# Patient Record
Sex: FEMALE | Race: WHITE | Employment: FULL TIME | URBAN - METROPOLITAN AREA
[De-identification: names, ages, dates, MRNs, and addresses within clinical notes are randomized per-mention and may not be internally consistent; named-entity substitution may affect disease eponyms.]

---

## 2017-03-28 ENCOUNTER — OFFICE VISIT (OUTPATIENT)
Dept: FAMILY MEDICINE CLINIC | Age: 52
End: 2017-03-28
Payer: COMMERCIAL

## 2017-03-28 VITALS
SYSTOLIC BLOOD PRESSURE: 120 MMHG | RESPIRATION RATE: 16 BRPM | HEIGHT: 60 IN | DIASTOLIC BLOOD PRESSURE: 86 MMHG | BODY MASS INDEX: 33.38 KG/M2 | TEMPERATURE: 99.1 F | HEART RATE: 80 BPM | WEIGHT: 170 LBS

## 2017-03-28 DIAGNOSIS — Z00.00 ROUTINE HEALTH MAINTENANCE: ICD-10-CM

## 2017-03-28 DIAGNOSIS — R06.83 SNORING: ICD-10-CM

## 2017-03-28 DIAGNOSIS — R63.5 ABNORMAL WEIGHT GAIN: Primary | ICD-10-CM

## 2017-03-28 DIAGNOSIS — Z11.59 NEED FOR HEPATITIS C SCREENING TEST: ICD-10-CM

## 2017-03-28 DIAGNOSIS — Z12.12 ENCOUNTER FOR COLORECTAL CANCER SCREENING: ICD-10-CM

## 2017-03-28 DIAGNOSIS — Z13.1 SCREENING FOR DIABETES MELLITUS: ICD-10-CM

## 2017-03-28 DIAGNOSIS — Z11.4 SCREENING FOR HIV (HUMAN IMMUNODEFICIENCY VIRUS): ICD-10-CM

## 2017-03-28 DIAGNOSIS — Z12.11 ENCOUNTER FOR COLORECTAL CANCER SCREENING: ICD-10-CM

## 2017-03-28 DIAGNOSIS — E66.9 OBESITY (BMI 30.0-34.9): ICD-10-CM

## 2017-03-28 DIAGNOSIS — R73.01 IFG (IMPAIRED FASTING GLUCOSE): ICD-10-CM

## 2017-03-28 DIAGNOSIS — G47.19 EXCESSIVE DAYTIME SLEEPINESS: ICD-10-CM

## 2017-03-28 PROCEDURE — 1036F TOBACCO NON-USER: CPT | Performed by: NURSE PRACTITIONER

## 2017-03-28 PROCEDURE — 3014F SCREEN MAMMO DOC REV: CPT | Performed by: NURSE PRACTITIONER

## 2017-03-28 PROCEDURE — 3017F COLORECTAL CA SCREEN DOC REV: CPT | Performed by: NURSE PRACTITIONER

## 2017-03-28 PROCEDURE — 99214 OFFICE O/P EST MOD 30 MIN: CPT | Performed by: NURSE PRACTITIONER

## 2017-03-28 PROCEDURE — G8484 FLU IMMUNIZE NO ADMIN: HCPCS | Performed by: NURSE PRACTITIONER

## 2017-03-28 PROCEDURE — G8427 DOCREV CUR MEDS BY ELIG CLIN: HCPCS | Performed by: NURSE PRACTITIONER

## 2017-03-28 PROCEDURE — G8417 CALC BMI ABV UP PARAM F/U: HCPCS | Performed by: NURSE PRACTITIONER

## 2017-03-28 RX ORDER — TAPENTADOL HYDROCHLORIDE 100 MG/1
100 TABLET, FILM COATED ORAL 3 TIMES DAILY
Refills: 0 | COMMUNITY
Start: 2017-01-15 | End: 2017-06-09 | Stop reason: ALTCHOICE

## 2017-03-28 ASSESSMENT — SLEEP AND FATIGUE QUESTIONNAIRES
HOW LIKELY ARE YOU TO NOD OFF OR FALL ASLEEP WHEN YOU ARE A PASSENGER IN A CAR FOR AN HOUR WITHOUT A BREAK: 1
HOW LIKELY ARE YOU TO NOD OFF OR FALL ASLEEP IN A CAR, WHILE STOPPED FOR A FEW MINUTES IN TRAFFIC: 0
HOW LIKELY ARE YOU TO NOD OFF OR FALL ASLEEP WHILE SITTING INACTIVE IN A PUBLIC PLACE: 2
HOW LIKELY ARE YOU TO NOD OFF OR FALL ASLEEP WHILE SITTING AND READING: 2
ESS TOTAL SCORE: 9
HOW LIKELY ARE YOU TO NOD OFF OR FALL ASLEEP WHILE SITTING AND TALKING TO SOMEONE: 0
NECK CIRCUMFERENCE (INCHES): 16.5
HOW LIKELY ARE YOU TO NOD OFF OR FALL ASLEEP WHILE WATCHING TV: 2
HOW LIKELY ARE YOU TO NOD OFF OR FALL ASLEEP WHILE SITTING QUIETLY AFTER LUNCH WITHOUT ALCOHOL: 0
HOW LIKELY ARE YOU TO NOD OFF OR FALL ASLEEP WHILE LYING DOWN TO REST IN THE AFTERNOON WHEN CIRCUMSTANCES PERMIT: 2

## 2017-04-02 ASSESSMENT — ENCOUNTER SYMPTOMS
BLOOD IN STOOL: 0
SORE THROAT: 0
ABDOMINAL DISTENTION: 0
SHORTNESS OF BREATH: 0
EYE PAIN: 0
RHINORRHEA: 0
DIARRHEA: 0
VOMITING: 0
ALLERGIC/IMMUNOLOGIC COMMENTS: PCN
WHEEZING: 0
NAUSEA: 0
BACK PAIN: 1
CHEST TIGHTNESS: 0
CONSTIPATION: 0
PHOTOPHOBIA: 0
ABDOMINAL PAIN: 0
COUGH: 0

## 2017-04-12 LAB
ALBUMIN SERPL-MCNC: 4.1 G/DL
ALP BLD-CCNC: 65 U/L
ALT SERPL-CCNC: 30 U/L
ANTIBODY: NONREACTIVE
AST SERPL-CCNC: 20 U/L
BASOPHILS ABSOLUTE: 74 /ΜL
BASOPHILS RELATIVE PERCENT: 0.7 %
BILIRUB SERPL-MCNC: 0.8 MG/DL (ref 0.1–1.4)
BUN BLDV-MCNC: 12 MG/DL
CALCIUM SERPL-MCNC: 9.3 MG/DL
CHLORIDE BLD-SCNC: 105 MMOL/L
CHOLESTEROL, TOTAL: 177 MG/DL
CHOLESTEROL/HDL RATIO: 4.4
CO2: 27 MMOL/L
CREAT SERPL-MCNC: 0.62 MG/DL
EOSINOPHILS ABSOLUTE: 116 /ΜL
EOSINOPHILS RELATIVE PERCENT: 1.1 %
GFR CALCULATED: NORMAL
GLUCOSE BLD-MCNC: 100 MG/DL
HBA1C MFR BLD: 6.7 %
HCT VFR BLD CALC: 39.4 % (ref 36–46)
HDLC SERPL-MCNC: 40 MG/DL (ref 35–70)
HEMOGLOBIN: 13 G/DL (ref 12–16)
LDL CHOLESTEROL CALCULATED: 100 MG/DL (ref 0–160)
LYMPHOCYTES ABSOLUTE: 2394 /ΜL
LYMPHOCYTES RELATIVE PERCENT: 22.8 %
MCH RBC QN AUTO: 30.3 PG
MCHC RBC AUTO-ENTMCNC: 32.9 G/DL
MCV RBC AUTO: 91.8 FL
MONOCYTES ABSOLUTE: 609 /ΜL
MONOCYTES RELATIVE PERCENT: 5.8 %
NEUTROPHILS ABSOLUTE: 7308 /ΜL
NEUTROPHILS RELATIVE PERCENT: 69.6 %
PLATELET # BLD: 274 K/ΜL
PMV BLD AUTO: 8.6 FL
POTASSIUM SERPL-SCNC: 4.5 MMOL/L
RBC # BLD: 4.29 10^6/ΜL
SODIUM BLD-SCNC: 139 MMOL/L
TOTAL PROTEIN: 6.3
TRIGL SERPL-MCNC: 183 MG/DL
TSH SERPL DL<=0.05 MIU/L-ACNC: 2.39 UIU/ML
VLDLC SERPL CALC-MCNC: 137 MG/DL
WBC # BLD: 10.5 10^3/ML

## 2017-04-13 DIAGNOSIS — Z00.00 ROUTINE HEALTH MAINTENANCE: ICD-10-CM

## 2017-04-18 ENCOUNTER — OFFICE VISIT (OUTPATIENT)
Dept: FAMILY MEDICINE CLINIC | Age: 52
End: 2017-04-18
Payer: COMMERCIAL

## 2017-04-18 ENCOUNTER — HOSPITAL ENCOUNTER (OUTPATIENT)
Age: 52
Setting detail: SPECIMEN
Discharge: HOME | End: 2017-04-18
Payer: COMMERCIAL

## 2017-04-18 VITALS
HEART RATE: 84 BPM | WEIGHT: 171 LBS | BODY MASS INDEX: 33.4 KG/M2 | RESPIRATION RATE: 16 BRPM | DIASTOLIC BLOOD PRESSURE: 84 MMHG | TEMPERATURE: 98.7 F | SYSTOLIC BLOOD PRESSURE: 124 MMHG

## 2017-04-18 DIAGNOSIS — E11.9 TYPE 2 DIABETES MELLITUS WITHOUT COMPLICATION, WITHOUT LONG-TERM CURRENT USE OF INSULIN (HCC): Primary | ICD-10-CM

## 2017-04-18 DIAGNOSIS — E11.9 TYPE 2 DIABETES MELLITUS WITHOUT COMPLICATION, WITHOUT LONG-TERM CURRENT USE OF INSULIN (HCC): ICD-10-CM

## 2017-04-18 DIAGNOSIS — R30.0 DYSURIA: ICD-10-CM

## 2017-04-18 DIAGNOSIS — E78.1 PURE HYPERGLYCERIDEMIA: ICD-10-CM

## 2017-04-18 LAB
BILIRUBIN, POC: NORMAL
BLOOD URINE, POC: NORMAL
CLARITY, POC: CLEAR
COLOR, POC: YELLOW
CREATININE URINE: 98.5 MG/DL (ref 28–217)
GLUCOSE URINE, POC: NORMAL
KETONES, POC: NORMAL
LEUKOCYTE EST, POC: NORMAL
MICROALBUMIN/CREAT 24H UR: <12 MG/L
MICROALBUMIN/CREAT UR-RTO: 12 MCG/MG CREAT
NITRITE, POC: NORMAL
PH, POC: 6
PROTEIN, POC: NORMAL
SPECIFIC GRAVITY, POC: 1.02
UROBILINOGEN, POC: 0.2

## 2017-04-18 PROCEDURE — G8427 DOCREV CUR MEDS BY ELIG CLIN: HCPCS | Performed by: NURSE PRACTITIONER

## 2017-04-18 PROCEDURE — 1036F TOBACCO NON-USER: CPT | Performed by: NURSE PRACTITIONER

## 2017-04-18 PROCEDURE — 3014F SCREEN MAMMO DOC REV: CPT | Performed by: NURSE PRACTITIONER

## 2017-04-18 PROCEDURE — G8417 CALC BMI ABV UP PARAM F/U: HCPCS | Performed by: NURSE PRACTITIONER

## 2017-04-18 PROCEDURE — 3017F COLORECTAL CA SCREEN DOC REV: CPT | Performed by: NURSE PRACTITIONER

## 2017-04-18 PROCEDURE — 81002 URINALYSIS NONAUTO W/O SCOPE: CPT | Performed by: NURSE PRACTITIONER

## 2017-04-18 PROCEDURE — 99214 OFFICE O/P EST MOD 30 MIN: CPT | Performed by: NURSE PRACTITIONER

## 2017-04-18 PROCEDURE — 3044F HG A1C LEVEL LT 7.0%: CPT | Performed by: NURSE PRACTITIONER

## 2017-04-18 RX ORDER — ATORVASTATIN CALCIUM 20 MG/1
20 TABLET, FILM COATED ORAL DAILY
Qty: 30 TABLET | Refills: 3 | Status: SHIPPED | OUTPATIENT
Start: 2017-04-18 | End: 2017-06-09 | Stop reason: SDUPTHER

## 2017-04-18 ASSESSMENT — ENCOUNTER SYMPTOMS: VISUAL CHANGE: 0

## 2017-04-19 LAB
CULTURE: NORMAL
CULTURE: NORMAL
Lab: NORMAL
SPECIMEN DESCRIPTION: NORMAL
STATUS: NORMAL

## 2017-04-20 DIAGNOSIS — R31.9 HEMATURIA: Primary | ICD-10-CM

## 2017-04-24 ENCOUNTER — TELEPHONE (OUTPATIENT)
Dept: FAMILY MEDICINE CLINIC | Age: 52
End: 2017-04-24

## 2017-04-26 ASSESSMENT — ENCOUNTER SYMPTOMS
VOMITING: 0
PHOTOPHOBIA: 0
DIARRHEA: 0
BACK PAIN: 1
CHEST TIGHTNESS: 0
WHEEZING: 0
SORE THROAT: 0
ABDOMINAL PAIN: 0
SHORTNESS OF BREATH: 0
EYE PAIN: 0
ALLERGIC/IMMUNOLOGIC COMMENTS: PCN
RHINORRHEA: 0
NAUSEA: 0
COUGH: 0
BLOOD IN STOOL: 0
ABDOMINAL DISTENTION: 0
CONSTIPATION: 0

## 2017-04-28 ENCOUNTER — NURSE ONLY (OUTPATIENT)
Dept: FAMILY MEDICINE CLINIC | Age: 52
End: 2017-04-28
Payer: COMMERCIAL

## 2017-04-28 DIAGNOSIS — Z12.12 ENCOUNTER FOR COLORECTAL CANCER SCREENING: ICD-10-CM

## 2017-04-28 DIAGNOSIS — Z12.11 ENCOUNTER FOR COLORECTAL CANCER SCREENING: ICD-10-CM

## 2017-04-28 DIAGNOSIS — Z12.11 SCREENING FOR COLON CANCER: Primary | ICD-10-CM

## 2017-04-28 LAB
CONTROL: PRESENT
HEMOCCULT STL QL: NEGATIVE

## 2017-04-28 PROCEDURE — 82274 ASSAY TEST FOR BLOOD FECAL: CPT | Performed by: NURSE PRACTITIONER

## 2017-05-02 RX ORDER — GLIMEPIRIDE 1 MG/1
1 TABLET ORAL
Qty: 30 TABLET | Refills: 3 | Status: SHIPPED | OUTPATIENT
Start: 2017-05-02 | End: 2017-11-24 | Stop reason: SINTOL

## 2017-06-09 ENCOUNTER — OFFICE VISIT (OUTPATIENT)
Dept: FAMILY MEDICINE CLINIC | Age: 52
End: 2017-06-09
Payer: COMMERCIAL

## 2017-06-09 VITALS
RESPIRATION RATE: 16 BRPM | SYSTOLIC BLOOD PRESSURE: 128 MMHG | WEIGHT: 169.9 LBS | TEMPERATURE: 99 F | DIASTOLIC BLOOD PRESSURE: 86 MMHG | BODY MASS INDEX: 33.18 KG/M2 | HEART RATE: 84 BPM

## 2017-06-09 DIAGNOSIS — G47.19 EXCESSIVE DAYTIME SLEEPINESS: ICD-10-CM

## 2017-06-09 DIAGNOSIS — E66.9 OBESITY (BMI 30.0-34.9): ICD-10-CM

## 2017-06-09 DIAGNOSIS — G47.33 MILD OBSTRUCTIVE SLEEP APNEA: ICD-10-CM

## 2017-06-09 DIAGNOSIS — E78.1 PURE HYPERGLYCERIDEMIA: ICD-10-CM

## 2017-06-09 DIAGNOSIS — E11.9 TYPE 2 DIABETES MELLITUS WITHOUT COMPLICATION, WITHOUT LONG-TERM CURRENT USE OF INSULIN (HCC): Primary | ICD-10-CM

## 2017-06-09 DIAGNOSIS — R06.83 SNORING: ICD-10-CM

## 2017-06-09 PROCEDURE — G8427 DOCREV CUR MEDS BY ELIG CLIN: HCPCS | Performed by: NURSE PRACTITIONER

## 2017-06-09 PROCEDURE — 1036F TOBACCO NON-USER: CPT | Performed by: NURSE PRACTITIONER

## 2017-06-09 PROCEDURE — 3014F SCREEN MAMMO DOC REV: CPT | Performed by: NURSE PRACTITIONER

## 2017-06-09 PROCEDURE — 3017F COLORECTAL CA SCREEN DOC REV: CPT | Performed by: NURSE PRACTITIONER

## 2017-06-09 PROCEDURE — G8417 CALC BMI ABV UP PARAM F/U: HCPCS | Performed by: NURSE PRACTITIONER

## 2017-06-09 PROCEDURE — 99214 OFFICE O/P EST MOD 30 MIN: CPT | Performed by: NURSE PRACTITIONER

## 2017-06-09 PROCEDURE — 3046F HEMOGLOBIN A1C LEVEL >9.0%: CPT | Performed by: NURSE PRACTITIONER

## 2017-06-09 RX ORDER — LANCETS 30 GAUGE
EACH MISCELLANEOUS
Qty: 100 EACH | Refills: 3 | Status: SHIPPED | OUTPATIENT
Start: 2017-06-09 | End: 2017-10-10 | Stop reason: SDUPTHER

## 2017-06-09 RX ORDER — GLUCOSAMINE HCL/CHONDROITIN SU 500-400 MG
CAPSULE ORAL
Qty: 50 STRIP | Refills: 6 | Status: SHIPPED | OUTPATIENT
Start: 2017-06-09 | End: 2017-10-10 | Stop reason: SDUPTHER

## 2017-06-09 RX ORDER — ATORVASTATIN CALCIUM 20 MG/1
20 TABLET, FILM COATED ORAL DAILY
Qty: 90 TABLET | Refills: 1 | Status: SHIPPED | OUTPATIENT
Start: 2017-06-09 | End: 2017-12-21 | Stop reason: SDUPTHER

## 2017-06-09 RX ORDER — METFORMIN HYDROCHLORIDE 750 MG/1
750 TABLET, EXTENDED RELEASE ORAL
Qty: 30 TABLET | Refills: 3 | Status: SHIPPED | OUTPATIENT
Start: 2017-06-09 | End: 2017-08-03 | Stop reason: SDUPTHER

## 2017-06-09 ASSESSMENT — PATIENT HEALTH QUESTIONNAIRE - PHQ9
1. LITTLE INTEREST OR PLEASURE IN DOING THINGS: 0
2. FEELING DOWN, DEPRESSED OR HOPELESS: 0
SUM OF ALL RESPONSES TO PHQ9 QUESTIONS 1 & 2: 0
SUM OF ALL RESPONSES TO PHQ QUESTIONS 1-9: 0

## 2017-06-15 PROBLEM — G47.33 MILD OBSTRUCTIVE SLEEP APNEA: Status: ACTIVE | Noted: 2017-06-15

## 2017-06-15 RX ORDER — BLOOD-GLUCOSE METER
EACH MISCELLANEOUS
Refills: 0 | COMMUNITY
Start: 2017-06-09

## 2017-06-15 ASSESSMENT — ENCOUNTER SYMPTOMS
ABDOMINAL PAIN: 0
EYE PAIN: 0
BACK PAIN: 1
SORE THROAT: 0
ALLERGIC/IMMUNOLOGIC COMMENTS: PCN
CHEST TIGHTNESS: 0
SHORTNESS OF BREATH: 0
PHOTOPHOBIA: 0
RHINORRHEA: 0
DIARRHEA: 0
BLOOD IN STOOL: 0
COUGH: 0
NAUSEA: 0
WHEEZING: 0
VOMITING: 0
ABDOMINAL DISTENTION: 0
CONSTIPATION: 0

## 2017-08-03 DIAGNOSIS — E11.9 TYPE 2 DIABETES MELLITUS WITHOUT COMPLICATION, WITHOUT LONG-TERM CURRENT USE OF INSULIN (HCC): ICD-10-CM

## 2017-08-03 RX ORDER — METFORMIN HYDROCHLORIDE 750 MG/1
750 TABLET, EXTENDED RELEASE ORAL
Qty: 90 TABLET | Refills: 1 | Status: SHIPPED | OUTPATIENT
Start: 2017-08-03 | End: 2018-01-15 | Stop reason: SDUPTHER

## 2017-10-10 DIAGNOSIS — E11.9 TYPE 2 DIABETES MELLITUS WITHOUT COMPLICATION, WITHOUT LONG-TERM CURRENT USE OF INSULIN (HCC): ICD-10-CM

## 2017-10-10 RX ORDER — BUTALBITAL, ACETAMINOPHEN AND CAFFEINE 50; 325; 40 MG/1; MG/1; MG/1
1 TABLET ORAL EVERY 4 HOURS PRN
Qty: 60 TABLET | Refills: 0 | OUTPATIENT
Start: 2017-10-10 | End: 2018-10-10

## 2017-10-10 RX ORDER — GLUCOSAMINE HCL/CHONDROITIN SU 500-400 MG
CAPSULE ORAL
Qty: 50 STRIP | Refills: 6 | Status: SHIPPED | OUTPATIENT
Start: 2017-10-10 | End: 2017-12-27 | Stop reason: SDUPTHER

## 2017-10-10 RX ORDER — LANCETS 30 GAUGE
EACH MISCELLANEOUS
Qty: 100 EACH | Refills: 3 | Status: SHIPPED | OUTPATIENT
Start: 2017-10-10 | End: 2020-03-13 | Stop reason: SDUPTHER

## 2017-10-10 NOTE — TELEPHONE ENCOUNTER
From: Donell Likes  Sent: 10/10/2017 8:54 AM EDT  Subject: Medication Renewal Request    Urvashi Bhatt would like a refill of the following medications:  Glucose Blood (BLOOD GLUCOSE TEST STRIPS) STRP Katie Spencer CNP]  Lancets MISEILEEN Spencer CNP]    Preferred pharmacy: [facility], [address] [facility] [phone] - R [phone]    Comment:  Have been having quite a few migraines again, requesting a refill on fioracet. ..not seeing this on my RX refill list

## 2017-11-22 ENCOUNTER — HOSPITAL ENCOUNTER (EMERGENCY)
Facility: CLINIC | Age: 52
Discharge: HOME | End: 2017-11-22
Attending: EMERGENCY MEDICINE
Payer: COMMERCIAL

## 2017-11-22 ENCOUNTER — APPOINTMENT (OUTPATIENT)
Dept: GENERAL RADIOLOGY | Facility: CLINIC | Age: 52
End: 2017-11-22
Payer: COMMERCIAL

## 2017-11-22 VITALS
WEIGHT: 160 LBS | OXYGEN SATURATION: 99 % | BODY MASS INDEX: 31.41 KG/M2 | DIASTOLIC BLOOD PRESSURE: 87 MMHG | HEIGHT: 60 IN | RESPIRATION RATE: 18 BRPM | SYSTOLIC BLOOD PRESSURE: 182 MMHG | TEMPERATURE: 98.7 F | HEART RATE: 82 BPM

## 2017-11-22 DIAGNOSIS — I10 HYPERTENSION, UNSPECIFIED TYPE: ICD-10-CM

## 2017-11-22 DIAGNOSIS — R07.9 CHEST PAIN, UNSPECIFIED TYPE: Primary | ICD-10-CM

## 2017-11-22 LAB
ABSOLUTE EOS #: 0.1 K/UL (ref 0–0.4)
ABSOLUTE IMMATURE GRANULOCYTE: ABNORMAL K/UL (ref 0–0.3)
ABSOLUTE LYMPH #: 3.5 K/UL (ref 1–4.8)
ABSOLUTE MONO #: 1.1 K/UL (ref 0.1–1.2)
ANION GAP SERPL CALCULATED.3IONS-SCNC: 16 MMOL/L (ref 9–17)
BASOPHILS # BLD: 1 % (ref 0–2)
BASOPHILS ABSOLUTE: 0.1 K/UL (ref 0–0.2)
BUN BLDV-MCNC: 22 MG/DL (ref 6–20)
BUN/CREAT BLD: ABNORMAL (ref 9–20)
CALCIUM SERPL-MCNC: 8.8 MG/DL (ref 8.6–10.4)
CHLORIDE BLD-SCNC: 103 MMOL/L (ref 98–107)
CO2: 21 MMOL/L (ref 20–31)
CREAT SERPL-MCNC: 0.5 MG/DL (ref 0.5–0.9)
D-DIMER QUANTITATIVE: 0.28 MG/L FEU
DIFFERENTIAL TYPE: ABNORMAL
EOSINOPHILS RELATIVE PERCENT: 1 % (ref 1–4)
GFR AFRICAN AMERICAN: >60 ML/MIN
GFR NON-AFRICAN AMERICAN: >60 ML/MIN
GFR SERPL CREATININE-BSD FRML MDRD: ABNORMAL ML/MIN/{1.73_M2}
GFR SERPL CREATININE-BSD FRML MDRD: ABNORMAL ML/MIN/{1.73_M2}
GLUCOSE BLD-MCNC: 173 MG/DL (ref 70–99)
HCT VFR BLD CALC: 40.7 % (ref 36–46)
HEMOGLOBIN: 13.9 G/DL (ref 12–16)
IMMATURE GRANULOCYTES: ABNORMAL %
INR BLD: 0.9
LYMPHOCYTES # BLD: 22 % (ref 24–44)
MCH RBC QN AUTO: 30.5 PG (ref 26–34)
MCHC RBC AUTO-ENTMCNC: 34.1 G/DL (ref 31–37)
MCV RBC AUTO: 89.4 FL (ref 80–100)
MONOCYTES # BLD: 7 % (ref 2–11)
MYOGLOBIN: <21 NG/ML (ref 25–58)
PDW BLD-RTO: 13 % (ref 12.5–15.4)
PLATELET # BLD: 326 K/UL (ref 140–450)
PLATELET ESTIMATE: ABNORMAL
PMV BLD AUTO: 8.1 FL (ref 6–12)
POTASSIUM SERPL-SCNC: 4.1 MMOL/L (ref 3.7–5.3)
PROTHROMBIN TIME: 9.5 SEC (ref 9.4–12.6)
RBC # BLD: 4.55 M/UL (ref 4–5.2)
RBC # BLD: ABNORMAL 10*6/UL
SEG NEUTROPHILS: 69 % (ref 36–66)
SEGMENTED NEUTROPHILS ABSOLUTE COUNT: 11 K/UL (ref 1.8–7.7)
SODIUM BLD-SCNC: 140 MMOL/L (ref 135–144)
TROPONIN INTERP: NORMAL
TROPONIN INTERP: NORMAL
TROPONIN T: <0.03 NG/ML
TROPONIN T: <0.03 NG/ML
WBC # BLD: 15.8 K/UL (ref 3.5–11)
WBC # BLD: ABNORMAL 10*3/UL

## 2017-11-22 PROCEDURE — 83874 ASSAY OF MYOGLOBIN: CPT

## 2017-11-22 PROCEDURE — 99285 EMERGENCY DEPT VISIT HI MDM: CPT

## 2017-11-22 PROCEDURE — 93005 ELECTROCARDIOGRAM TRACING: CPT

## 2017-11-22 PROCEDURE — 84484 ASSAY OF TROPONIN QUANT: CPT

## 2017-11-22 PROCEDURE — 71020 XR CHEST STANDARD TWO VW: CPT

## 2017-11-22 PROCEDURE — 85610 PROTHROMBIN TIME: CPT

## 2017-11-22 PROCEDURE — 85025 COMPLETE CBC W/AUTO DIFF WBC: CPT

## 2017-11-22 PROCEDURE — 80048 BASIC METABOLIC PNL TOTAL CA: CPT

## 2017-11-22 PROCEDURE — 85379 FIBRIN DEGRADATION QUANT: CPT

## 2017-11-22 RX ORDER — NICOTINE 14MG/24HR
250 PATCH, TRANSDERMAL 24 HOURS TRANSDERMAL DAILY
COMMUNITY

## 2017-11-22 RX ORDER — ASCORBIC ACID 500 MG
500 TABLET ORAL DAILY
COMMUNITY
End: 2022-03-07

## 2017-11-22 RX ORDER — MAGNESIUM OXIDE 400 MG/1
400 TABLET ORAL DAILY
COMMUNITY

## 2017-11-22 ASSESSMENT — ENCOUNTER SYMPTOMS
COUGH: 0
NAUSEA: 0
VOMITING: 0
BACK PAIN: 1
CONSTIPATION: 0
BLOOD IN STOOL: 0
SHORTNESS OF BREATH: 1
DIARRHEA: 0
EYE PAIN: 0
ABDOMINAL PAIN: 0
CHEST TIGHTNESS: 1

## 2017-11-22 ASSESSMENT — PAIN DESCRIPTION - LOCATION: LOCATION: CHEST

## 2017-11-22 ASSESSMENT — PAIN SCALES - GENERAL: PAINLEVEL_OUTOF10: 2

## 2017-11-22 ASSESSMENT — PAIN DESCRIPTION - PAIN TYPE: TYPE: ACUTE PAIN

## 2017-11-23 NOTE — ED PROVIDER NOTES
[facility]  [address] [id number]  Phone: [phone]        Pt Name: Danny Chavez  MRN: [id number]  [place] 1965  Date of evaluation: 11/22/17      CHIEF COMPLAINT       Chief Complaint   Patient presents with    Chest Pain     started about 4 days after receiving a steroid shot for back pian         HISTORY OF PRESENT ILLNESS    Urvashi Acosta is a 46 y.o. female who presents With the chief complaint of chest pain she says it radiates to her back she's had it 4 days ago when she received a steroid shot for chronic back pain. Patient states that she often has the back pain but the chest pain was something a little different and she also has been under a lot more stress recently she felt like her blood pressure was elevated. When squad arrived her pressure was over 913 systolic and they treated her with 4 baby aspirin and 2 nitro which relieved the pain and brought her pressure down under 200  Patient says her blood pressures were elevated intermittently in the past but Has always come down and she's not any antihypertensives she does have a history of smoking she now uses the vapor cigarettes and also has a history of new onset of type 2 diabetes  Is been no recent travel or immobilization  Patient states she did feel short of breath with this, Again she states that she's quite stressed over work currently and that the pain is more or less constant for the last 4 days  REVIEW OF SYSTEMS         Review of Systems   Constitutional: Negative for chills and fever. HENT: Negative for congestion and ear pain. Eyes: Negative for pain and visual disturbance. Respiratory: Positive for chest tightness and shortness of breath. Negative for cough. Cardiovascular: Positive for chest pain. Negative for palpitations and leg swelling. Gastrointestinal: Negative for abdominal pain, blood in stool, constipation, diarrhea, nausea and vomiting.    Endocrine: Negative for polydipsia and person, place, and time. Skin: Skin is warm and dry. She is not diaphoretic. Psychiatric: She has a normal mood and affect. Her behavior is normal.       DIFFERENTIAL DIAGNOSIS/ MDM:     Chest pain rule out ACS rule out hypertensive urgency O will also do a d-dimer    DIAGNOSTIC RESULTS     EKG: All EKG's are interpreted by the Emergency Department Physician who either signs or Co-signs this chart in the absence of a cardiologist.    Normal sinus rhythm rate of 88 bpm IN interval is 130 ms QS duration 70 ms QT corrected is 423 ms axis is 45 is no acute ST or T wave changes seenSome nonspecific changes are noted  Repeat EKG done 2 hours shows a normal sinus rhythm at 78 bpm, IN interval is normal the 130 ms QRS duration is normal at 76 ms QTc interval 442 ms axis is 51 again this is nonspecific T wave changes and no acute ST elevation or depression seen  RADIOLOGY:   Non-plain film images such as CT, Ultrasound and MRI are read by the radiologist. Plain radiographic images are visualized and the radiologist interpretations are reviewed as follows:        EXAMINATION:   TWO VIEWS OF THE CHEST       11/22/2017 8:57 pm       COMPARISON:   None.       HISTORY:   ORDERING SYSTEM PROVIDED HISTORY: shortness of breath   TECHNOLOGIST PROVIDED HISTORY:   Reason for exam:->shortness of breath   Ordering Physician Provided Reason for Exam: Chest Pain (started about 4 days   after receiving a steroid shot for back pian)   Acuity: Acute   Type of Exam: Initial       FINDINGS:   The lungs are without acute focal process.  There is no effusion or   pneumothorax. The cardiomediastinal silhouette is without acute process.  The   osseous structures are without acute process.           Impression   No acute process.             LABS:  Results for orders placed or performed during the hospital encounter of 11/22/17   Myoglobin, Serum   Result Value Ref Range    Myoglobin <21 (L) 25 - 58 ng/mL   Protime-INR   Result Value Ref Range Protime 9.5 9.4 - 12.6 sec    INR 0.9    CBC Auto Differential   Result Value Ref Range    WBC 15.8 (H) 3.5 - 11.0 k/uL    RBC 4.55 4.0 - 5.2 m/uL    Hemoglobin 13.9 12.0 - 16.0 g/dL    Hematocrit 40.7 36 - 46 %    MCV 89.4 80 - 100 fL    MCH 30.5 26 - 34 pg    MCHC 34.1 31 - 37 g/dL    RDW 13.0 12.5 - 15.4 %    Platelets 906 166 - 717 k/uL    MPV 8.1 6.0 - 12.0 fL    Differential Type NOT REPORTED     Seg Neutrophils 69 (H) 36 - 66 %    Lymphocytes 22 (L) 24 - 44 %    Monocytes 7 2 - 11 %    Eosinophils % 1 1 - 4 %    Basophils 1 0 - 2 %    Immature Granulocytes NOT REPORTED 0 %    Segs Absolute 11.00 (H) 1.8 - 7.7 k/uL    Absolute Lymph # 3.50 1.0 - 4.8 k/uL    Absolute Mono # 1.10 0.1 - 1.2 k/uL    Absolute Eos # 0.10 0.0 - 0.4 k/uL    Basophils # 0.10 0.0 - 0.2 k/uL    Absolute Immature Granulocyte NOT REPORTED 0.00 - 0.30 k/uL    WBC Morphology NOT REPORTED     RBC Morphology NOT REPORTED     Platelet Estimate NOT REPORTED    Basic Metabolic Panel   Result Value Ref Range    Glucose 173 (H) 70 - 99 mg/dL    BUN 22 (H) 6 - 20 mg/dL    CREATININE 0.50 0.50 - 0.90 mg/dL    Bun/Cre Ratio NOT REPORTED 9 - 20    Calcium 8.8 8.6 - 10.4 mg/dL    Sodium 140 135 - 144 mmol/L    Potassium 4.1 3.7 - 5.3 mmol/L    Chloride 103 98 - 107 mmol/L    CO2 21 20 - 31 mmol/L    Anion Gap 16 9 - 17 mmol/L    GFR Non-African American >60 >60 mL/min    GFR African American >60 >60 mL/min    GFR Comment          GFR Staging NOT REPORTED    Troponin   Result Value Ref Range    Troponin T <0.03 <0.03 ng/mL    Troponin Interp         D-Dimer, Quantitative   Result Value Ref Range    D-Dimer, Quant 0.28 mg/L FEU   Troponin   Result Value Ref Range    Troponin T <0.03 <0.03 ng/mL    Troponin Interp             Labs are unremarkable    EMERGENCY DEPARTMENT COURSE:   Vitals:    Vitals:    11/22/17 2009   BP: (!) 183/97   Pulse: 85   Resp: 20   Temp: 98.7 °F (37.1 °C)   TempSrc: Oral   SpO2: 99%   Weight: 72.6 kg (160 lb)   Height: 5' (1.524 m)     -------------------------  BP: (!) 183/97, Temp: 98.7 °F (37.1 °C), Pulse: 85, Resp: 20    Should resting comfortably I discussed stopping her smoking and also discussed following up with her physician for hypertension she does state she purchased a blood pressure cuff but she hasn't used it yet. CONSULTS:      PROCEDURES:  None    FINAL IMPRESSION      1. Chest pain, unspecified type    2. Hypertension, unspecified type          DISPOSITION/PLAN   Discharged in stable condition    PATIENT REFERRED TO:  Allyson Robles MD  [address] [id number]            DISCHARGE MEDICATIONS:  New Prescriptions    No medications on file       (Please note that portions of this note were completed with a voice recognition program.  Efforts were made to edit the dictations but occasionally words are mis-transcribed.)    Montero MD, F.A.A.E.M.   Attending Emergency Medicine Physician        Julianna Gross MD  11/22/17 2727       Julianna Gross MD  11/22/17 [id number]

## 2017-11-23 NOTE — ED NOTES
Pt arrived at ED with c/o chest pressure and elevated blood pressure for the last 4 days after receiving a steroid shot for her back. Pt sts the pressure is more to the back than to the front. Pt denies any other problems at this time. Lungs are clear, respirations non-labored.      Cruz Wilder, RN  11/22/17 0717

## 2017-11-24 ENCOUNTER — OFFICE VISIT (OUTPATIENT)
Dept: FAMILY MEDICINE CLINIC | Age: 52
End: 2017-11-24
Payer: COMMERCIAL

## 2017-11-24 VITALS
SYSTOLIC BLOOD PRESSURE: 164 MMHG | BODY MASS INDEX: 31.42 KG/M2 | DIASTOLIC BLOOD PRESSURE: 94 MMHG | WEIGHT: 160.05 LBS | HEIGHT: 60 IN | HEART RATE: 82 BPM | RESPIRATION RATE: 18 BRPM

## 2017-11-24 DIAGNOSIS — F41.1 GAD (GENERALIZED ANXIETY DISORDER): ICD-10-CM

## 2017-11-24 DIAGNOSIS — I10 ESSENTIAL HYPERTENSION: Primary | ICD-10-CM

## 2017-11-24 DIAGNOSIS — Z12.39 BREAST CANCER SCREENING: ICD-10-CM

## 2017-11-24 LAB
EKG ATRIAL RATE: 78 BPM
EKG ATRIAL RATE: 88 BPM
EKG P AXIS: 13 DEGREES
EKG P AXIS: 43 DEGREES
EKG P-R INTERVAL: 130 MS
EKG P-R INTERVAL: 130 MS
EKG Q-T INTERVAL: 350 MS
EKG Q-T INTERVAL: 388 MS
EKG QRS DURATION: 70 MS
EKG QRS DURATION: 76 MS
EKG QTC CALCULATION (BAZETT): 423 MS
EKG QTC CALCULATION (BAZETT): 442 MS
EKG R AXIS: 45 DEGREES
EKG R AXIS: 51 DEGREES
EKG T AXIS: 10 DEGREES
EKG T AXIS: 6 DEGREES
EKG VENTRICULAR RATE: 78 BPM
EKG VENTRICULAR RATE: 88 BPM

## 2017-11-24 PROCEDURE — 99214 OFFICE O/P EST MOD 30 MIN: CPT | Performed by: NURSE PRACTITIONER

## 2017-11-24 PROCEDURE — 3017F COLORECTAL CA SCREEN DOC REV: CPT | Performed by: NURSE PRACTITIONER

## 2017-11-24 PROCEDURE — G8417 CALC BMI ABV UP PARAM F/U: HCPCS | Performed by: NURSE PRACTITIONER

## 2017-11-24 PROCEDURE — 3014F SCREEN MAMMO DOC REV: CPT | Performed by: NURSE PRACTITIONER

## 2017-11-24 PROCEDURE — G8484 FLU IMMUNIZE NO ADMIN: HCPCS | Performed by: NURSE PRACTITIONER

## 2017-11-24 PROCEDURE — G8427 DOCREV CUR MEDS BY ELIG CLIN: HCPCS | Performed by: NURSE PRACTITIONER

## 2017-11-24 PROCEDURE — 1036F TOBACCO NON-USER: CPT | Performed by: NURSE PRACTITIONER

## 2017-11-24 RX ORDER — LOSARTAN POTASSIUM 25 MG/1
25 TABLET ORAL DAILY
Qty: 30 TABLET | Refills: 3 | Status: SHIPPED | OUTPATIENT
Start: 2017-11-24 | End: 2018-01-15 | Stop reason: SDUPTHER

## 2017-11-24 RX ORDER — ASPIRIN 81 MG/1
81 TABLET ORAL DAILY
Qty: 30 TABLET | COMMUNITY
Start: 2017-11-24 | End: 2021-11-10 | Stop reason: CLARIF

## 2017-11-24 RX ORDER — AMITRIPTYLINE HYDROCHLORIDE 10 MG/1
20 TABLET, FILM COATED ORAL NIGHTLY
Qty: 60 TABLET | Refills: 0 | Status: SHIPPED | OUTPATIENT
Start: 2017-11-24 | End: 2018-02-03 | Stop reason: SDUPTHER

## 2017-11-24 RX ORDER — HYDROCHLOROTHIAZIDE 12.5 MG/1
12.5 CAPSULE, GELATIN COATED ORAL DAILY
Qty: 30 CAPSULE | Refills: 3 | Status: SHIPPED | OUTPATIENT
Start: 2017-11-24 | End: 2018-01-15 | Stop reason: SDUPTHER

## 2017-11-24 RX ORDER — AMITRIPTYLINE HYDROCHLORIDE 10 MG/1
1 TABLET, FILM COATED ORAL DAILY
COMMUNITY
Start: 2017-07-21 | End: 2017-11-24 | Stop reason: SDUPTHER

## 2017-11-24 RX ORDER — ALPRAZOLAM 0.25 MG/1
0.25 TABLET ORAL 2 TIMES DAILY PRN
Qty: 10 TABLET | Refills: 0 | Status: SHIPPED | OUTPATIENT
Start: 2017-11-24 | End: 2017-12-24

## 2017-11-24 ASSESSMENT — ENCOUNTER SYMPTOMS
ABDOMINAL PAIN: 0
BLURRED VISION: 1
DIARRHEA: 0
COUGH: 0
WHEEZING: 0
NAUSEA: 0
CONSTIPATION: 0
CHEST TIGHTNESS: 1
SHORTNESS OF BREATH: 1
VOMITING: 0

## 2017-11-24 NOTE — PATIENT INSTRUCTIONS
social groups, or volunteer to help others. Being alone sometimes makes things seem worse than they are. · Get at least 30 minutes of exercise on most days of the week to relieve stress. Walking is a good choice. You also may want to do other activities, such as running, swimming, cycling, or playing tennis or team sports. Relaxation techniques  Do relaxation exercises 10 to 20 minutes a day. You can play soothing, relaxing music while you do them, if you wish. · Tell others in your house that you are going to do your relaxation exercises. Ask them not to disturb you. · Find a comfortable place, away from all distractions and noise. · Lie down on your back, or sit with your back straight. · Focus on your breathing. Make it slow and steady. · Breathe in through your nose. Breathe out through either your nose or mouth. · Breathe deeply, filling up the area between your navel and your rib cage. Breathe so that your belly goes up and down. · Do not hold your breath. · Breathe like this for 5 to 10 minutes. Notice the feeling of calmness throughout your whole body. As you continue to breathe slowly and deeply, relax by doing the following for another 5 to 10 minutes:  · Tighten and relax each muscle group in your body. You can begin at your toes and work your way up to your head. · Imagine your muscle groups relaxing and becoming heavy. · Empty your mind of all thoughts. · Let yourself relax more and more deeply. · Become aware of the state of calmness that surrounds you. · When your relaxation time is over, you can bring yourself back to alertness by moving your fingers and toes and then your hands and feet and then stretching and moving your entire body. Sometimes people fall asleep during relaxation, but they usually wake up shortly afterward. · Always give yourself time to return to full alertness before you drive a car or do anything that might cause an accident if you are not fully alert.  Never play a relaxation tape while you drive a car. When should you call for help? Call 911 anytime you think you may need emergency care. For example, call if:  · You feel you cannot stop from hurting yourself or someone else. Keep the numbers for these national suicide hotlines: 7-520-918-TALK (8-690.431.2823) and 5-156-RWVSLAK (0-573.683.3322). If you or someone you know talks about suicide or feeling hopeless, get help right away. Watch closely for changes in your health, and be sure to contact your doctor if:  · You have anxiety or fear that affects your life. · You have symptoms of anxiety that are new or different from those you had before. Where can you learn more? Go to https://SureSpeakpeleonidaseweb.TCHO. org and sign in to your Index account. Enter P754 in the [employer] box to learn more about \"Anxiety Disorder: Care Instructions. \"     If you do not have an account, please click on the \"Sign Up Now\" link. Current as of: July 26, 2016  Content Version: 11.3  © 2290-0162 Collaborate.com. Care instructions adapted under license by [facility] [place] ([facility]). If you have questions about a medical condition or this instruction, always ask your healthcare professional. [address] any warranty or liability for your use of this information. Patient Education        DASH Diet: Care Instructions  Your Care Instructions  The DASH diet is an eating plan that can help lower your blood pressure. DASH stands for Dietary Approaches to Stop Hypertension. Hypertension is high blood pressure. The DASH diet focuses on eating foods that are high in calcium, potassium, and magnesium. These nutrients can lower blood pressure. The foods that are highest in these nutrients are fruits, vegetables, low-fat dairy products, nuts, seeds, and legumes.  But taking calcium, potassium, and magnesium supplements instead of eating foods that are high in those nutrients does not have the same 5 servings or less a week. A serving is 1 tablespoon jelly or jam, ½ cup sorbet, or 1 cup of lemonade. · Eat less than 2,300 milligrams (mg) of sodium a day. If you limit your sodium to 1,500 mg a day, you can lower your blood pressure even more. Tips for success  · Start small. Do not try to make dramatic changes to your diet all at once. You might feel that you are missing out on your favorite foods and then be more likely to not follow the plan. Make small changes, and stick with them. Once those changes become habit, add a few more changes. · Try some of the following:  ¨ Make it a goal to eat a fruit or vegetable at every meal and at snacks. This will make it easy to get the recommended amount of fruits and vegetables each day. ¨ Try yogurt topped with fruit and nuts for a snack or healthy dessert. ¨ Add lettuce, tomato, cucumber, and onion to sandwiches. ¨ Combine a ready-made pizza crust with low-fat mozzarella cheese and lots of vegetable toppings. Try using tomatoes, squash, spinach, broccoli, carrots, cauliflower, and onions. ¨ Have a variety of cut-up vegetables with a low-fat dip as an appetizer instead of chips and dip. ¨ Sprinkle sunflower seeds or chopped almonds over salads. Or try adding chopped walnuts or almonds to cooked vegetables. ¨ Try some vegetarian meals using beans and peas. Add garbanzo or kidney beans to salads. Make burritos and tacos with mashed lopez beans or black beans. Where can you learn more? Go to https://[employer].Sportomania. org and sign in to your GenerationOne account. Enter E570 in the FanFueled box to learn more about \"DASH Diet: Care Instructions. \"     If you do not have an account, please click on the \"Sign Up Now\" link. Current as of: April 3, 2017  Content Version: 11.3  © 2757-9684 TripFab. Care instructions adapted under license by [employer].  If you have questions about a medical condition or this instruction, always often at first or until your blood pressure comes down. · If you are taking blood pressure medicine, talk to your doctor before you take decongestants or anti-inflammatory medicine, such as ibuprofen. Some of these medicines can raise blood pressure. · Learn how to check your blood pressure at home. Lifestyle changes  · Stay at a healthy weight. This is especially important if you put on weight around the waist. Losing even 10 pounds can help you lower your blood pressure. · If your doctor recommends it, get more exercise. Walking is a good choice. Bit by bit, increase the amount you walk every day. Try for at least 30 minutes on most days of the week. You also may want to swim, bike, or do other activities. · Avoid or limit alcohol. Talk to your doctor about whether you can drink any alcohol. · Try to limit how much sodium you eat to less than 2,300 milligrams (mg) a day. Your doctor may ask you to try to eat less than 1,500 mg a day. · Eat plenty of fruits (such as bananas and oranges), vegetables, legumes, whole grains, and low-fat dairy products. · Lower the amount of saturated fat in your diet. Saturated fat is found in animal products such as milk, cheese, and meat. Limiting these foods may help you lose weight and also lower your risk for heart disease. · Do not smoke. Smoking increases your risk for heart attack and stroke. If you need help quitting, talk to your doctor about stop-smoking programs and medicines. These can increase your chances of quitting for good. When should you call for help? Call 911 anytime you think you may need emergency care. This may mean having symptoms that suggest that your blood pressure is causing a serious heart or blood vessel problem. Your blood pressure may be over 180/110. For example, call 911 if:  · You have symptoms of a heart attack. These may include:  ¨ Chest pain or pressure, or a strange feeling in the chest.  ¨ Sweating.   ¨ Shortness of breath. ¨ Nausea or vomiting. ¨ Pain, pressure, or a strange feeling in the back, neck, jaw, or upper belly or in one or both shoulders or arms. ¨ Lightheadedness or sudden weakness. ¨ A fast or irregular heartbeat. · You have symptoms of a stroke. These may include:  ¨ Sudden numbness, tingling, weakness, or loss of movement in your face, arm, or leg, especially on only one side of your body. ¨ Sudden vision changes. ¨ Sudden trouble speaking. ¨ Sudden confusion or trouble understanding simple statements. ¨ Sudden problems with walking or balance. ¨ A sudden, severe headache that is different from past headaches. · You have severe back or belly pain. Do not wait until your blood pressure comes down on its own. Get help right away. Call your doctor now or seek immediate care if:  · Your blood pressure is much higher than normal (such as 180/110 or higher), but you don't have symptoms. · You think high blood pressure is causing symptoms, such as:  ¨ Severe headache. ¨ Blurry vision. Watch closely for changes in your health, and be sure to contact your doctor if:  · Your blood pressure measures 140/90 or higher at least 2 times. That means the top number is 140 or higher or the bottom number is 90 or higher, or both. · You think you may be having side effects from your blood pressure medicine. · Your blood pressure is usually normal, but it goes above normal at least 2 times. Where can you learn more? Go to https://Roxro PharmapeContextPlane.Cascade Technologies. org and sign in to your Game Nation account. Enter V802 in the scPharmaceuticals box to learn more about \"High Blood Pressure: Care Instructions. \"     If you do not have an account, please click on the \"Sign Up Now\" link. Current as of: August 8, 2016  Content Version: 11.3  © 6072-9901 [employer]arma. Care instructions adapted under license by [facility] ([facility]).  If you have questions about a medical condition or this instruction, always ask your healthcare professional. [address] any warranty or liability for your use of this information.

## 2017-11-24 NOTE — PROGRESS NOTES
Subjective:      Patient ID: Blas Georges is a 46 y.o. female. Visit Information    Have you changed or started any medications since your last visit including any over-the-counter medicines, vitamins, or herbal medicines? no   Are you having any side effects from any of your medications? -  no  Have you stopped taking any of your medications? Is so, why? -  no    Have you seen any other physician or provider since your last visit? Yes - Records Requested  Have you had any other diagnostic tests since your last visit? No  Have you been seen in the emergency room and/or had an admission to a hospital since we last saw you? Yes - Records Obtained  Have you had your routine dental cleaning in the past 6 months? no    Have you activated your "" account? If not, what are your barriers? Yes     Patient Care Team:  Pema Knott MD as PCP - General    Medical History Review  Past Medical, Family, and Social History reviewed and does not contribute to the patient presenting condition    Health Maintenance   Topic Date Due    Diabetic retinal exam  03/30/1975    DTaP/Tdap/Td vaccine (1 - Tdap) 03/30/1984    Pneumococcal med risk (1 of 1 - PPSV23) 03/30/1984    Breast cancer screen  03/30/2015    Cervical cancer screen  04/16/2015    Flu vaccine (1) 09/03/2018 (Originally 9/1/2017)    Diabetic hemoglobin A1C test  04/12/2018    Lipid screen  04/12/2018    Diabetic microalbuminuria test  04/18/2018    Diabetic foot exam  04/26/2018    Colon Cancer Screen FIT/FOBT  04/28/2018    Hepatitis C screen  Completed    HIV screen  Completed       Seen in ER 2 days ago with elevated blood pressure and chest pain. Cardiac work up with negative. Told to monitor blood pressure and follow up here. Blood pressure still high. Has been anxious for years - taken xanax and ativan in past which helped. Pain management has tried effexor and celexa in past which she did not do well on.  Has been taking elavil at night to help with sleep they had mentioned increasing but have not done so. She would like increased and would like few xanax to help until increase dose of elavil starts working. [place]       Hypertension   This is a new problem. The current episode started in the past 7 days. The problem is unchanged. The problem is uncontrolled. Associated symptoms include anxiety, blurred vision (2 days ago a little - better today), chest pain (intermittent), headaches, malaise/fatigue, palpitations and shortness of breath (not today). Pertinent negatives include no peripheral edema. Agents associated with hypertension include NSAIDs. Risk factors for coronary artery disease include diabetes mellitus, dyslipidemia, obesity, family history, stress and sedentary lifestyle. Past treatments include nothing. Compliance problems include exercise. Anxiety: Patient complains of evaluation of anxiety disorder. She has the following anxiety symptoms: chest pain, difficulty concentrating, fatigue, insomnia, irritable, palpitations, racing thoughts, shortness of breath, sweating. Onset of symptoms was approximately several years ago, waxing and waning since that time. She denies current suicidal and homicidal ideation. Family history significant for anxiety. Possible organic causes contributing are: none. Risk factors: positive family history in  mother Previous treatment includes Celexa, Effexor, Xanax, and Ativan and none. She complains of the following side effects from the treatment: none. Review of Systems   Constitutional: Positive for fatigue and malaise/fatigue. Negative for activity change, appetite change and fever. HENT: Negative. Eyes: Positive for blurred vision (2 days ago a little - better today). Respiratory: Positive for chest tightness (at times with increased anxiety) and shortness of breath (not today). Negative for cough and wheezing. Cardiovascular: Positive for chest pain (intermittent) and palpitations.  Negative for leg swelling. Gastrointestinal: Negative for abdominal pain, constipation, diarrhea, nausea and vomiting. Genitourinary: Negative. Regular menses   Skin: Negative for rash. Neurological: Positive for light-headedness (at times) and headaches. Negative for dizziness, seizures, syncope, weakness and numbness. Psychiatric/Behavioral: Positive for agitation and sleep disturbance (not as good). Negative for self-injury and suicidal ideas. The patient is nervous/anxious. Objective:   Physical Exam   Constitutional: She is oriented to person, place, and time. Vital signs are normal. She appears well-developed and well-nourished. She is cooperative. No distress. HENT:   Head: Atraumatic. Right Ear: Tympanic membrane, external ear and ear canal normal.   Left Ear: Tympanic membrane, external ear and ear canal normal.   Nose: Nose normal.   Mouth/Throat: Uvula is midline, oropharynx is clear and moist and mucous membranes are normal.   Eyes: Conjunctivae and lids are normal. Right eye exhibits no discharge. Left eye exhibits no discharge. Neck: Normal range of motion. Neck supple. Cardiovascular: Normal rate, regular rhythm, S1 normal, S2 normal and normal heart sounds. No murmur heard. Pulses:       Radial pulses are 2+ on the right side, and 2+ on the left side. Posterior tibial pulses are 2+ on the right side, and 2+ on the left side. Pulmonary/Chest: Effort normal and breath sounds normal. No respiratory distress. She has no wheezes. She has no rhonchi. Abdominal: Soft. Bowel sounds are normal. She exhibits no distension. There is no hepatosplenomegaly. There is no tenderness. There is no rebound and no CVA tenderness. Musculoskeletal: She exhibits no edema or tenderness. Lymphadenopathy:     She has no cervical adenopathy. Neurological: She is alert and oriented to person, place, and time. She has normal strength. She exhibits normal muscle tone.    Skin: Skin is

## 2017-11-30 ENCOUNTER — OFFICE VISIT (OUTPATIENT)
Dept: FAMILY MEDICINE CLINIC | Age: 52
End: 2017-11-30
Payer: COMMERCIAL

## 2017-11-30 VITALS
TEMPERATURE: 99.2 F | SYSTOLIC BLOOD PRESSURE: 136 MMHG | DIASTOLIC BLOOD PRESSURE: 82 MMHG | HEART RATE: 84 BPM | WEIGHT: 165 LBS | BODY MASS INDEX: 32.22 KG/M2 | RESPIRATION RATE: 14 BRPM

## 2017-11-30 DIAGNOSIS — I10 ESSENTIAL HYPERTENSION: Primary | ICD-10-CM

## 2017-11-30 DIAGNOSIS — R07.9 CHEST PAIN, UNSPECIFIED TYPE: ICD-10-CM

## 2017-11-30 PROCEDURE — G8427 DOCREV CUR MEDS BY ELIG CLIN: HCPCS | Performed by: NURSE PRACTITIONER

## 2017-11-30 PROCEDURE — 99214 OFFICE O/P EST MOD 30 MIN: CPT | Performed by: NURSE PRACTITIONER

## 2017-11-30 PROCEDURE — G8484 FLU IMMUNIZE NO ADMIN: HCPCS | Performed by: NURSE PRACTITIONER

## 2017-11-30 PROCEDURE — 3017F COLORECTAL CA SCREEN DOC REV: CPT | Performed by: NURSE PRACTITIONER

## 2017-11-30 PROCEDURE — G8417 CALC BMI ABV UP PARAM F/U: HCPCS | Performed by: NURSE PRACTITIONER

## 2017-11-30 PROCEDURE — 1036F TOBACCO NON-USER: CPT | Performed by: NURSE PRACTITIONER

## 2017-11-30 PROCEDURE — 3014F SCREEN MAMMO DOC REV: CPT | Performed by: NURSE PRACTITIONER

## 2017-11-30 ASSESSMENT — ENCOUNTER SYMPTOMS
CONSTIPATION: 0
SHORTNESS OF BREATH: 1
NAUSEA: 0
COUGH: 0
ABDOMINAL PAIN: 0
WHEEZING: 0
VOMITING: 0
DIARRHEA: 0
CHEST TIGHTNESS: 1

## 2017-11-30 NOTE — PROGRESS NOTES
sleeping which could be contributing to her symptoms. Sleeping is getting better since increasing elavil. santa       Hypertension   This is a new problem. The current episode started in the past 7 days. The problem has been waxing and waning since onset. The problem is uncontrolled. Associated symptoms include anxiety, chest pain (intermittent), headaches, malaise/fatigue, palpitations and shortness of breath (not today). Pertinent negatives include no peripheral edema. Risk factors for coronary artery disease include diabetes mellitus, dyslipidemia, family history, obesity and stress. Past treatments include diuretics and angiotensin blockers. The current treatment provides mild improvement. Review of Systems   Constitutional: Positive for fatigue and malaise/fatigue. Negative for activity change, appetite change and fever. HENT: Negative. Respiratory: Positive for chest tightness (at times with increased anxiety) and shortness of breath (not today). Negative for cough and wheezing. Cardiovascular: Positive for chest pain (intermittent) and palpitations. Negative for leg swelling. Gastrointestinal: Negative for abdominal pain, constipation, diarrhea, nausea and vomiting. Genitourinary: Negative. Regular menses   Skin: Negative for rash. Neurological: Positive for light-headedness (at times) and headaches. Negative for dizziness, seizures, syncope, weakness and numbness. Psychiatric/Behavioral: Positive for agitation and sleep disturbance (not as good). Negative for self-injury and suicidal ideas. The patient is nervous/anxious. Objective:   Physical Exam   Constitutional: She is oriented to person, place, and time. Vital signs are normal. She appears well-developed and well-nourished. She is cooperative. No distress. HENT:   Head: Atraumatic.    Right Ear: Tympanic membrane, external ear and ear canal normal.   Left Ear: Tympanic membrane, external ear and ear canal normal. Nose: Nose normal.   Mouth/Throat: Uvula is midline, oropharynx is clear and moist and mucous membranes are normal.   Eyes: Conjunctivae and lids are normal. Right eye exhibits no discharge. Left eye exhibits no discharge. Neck: Normal range of motion. Neck supple. Cardiovascular: Normal rate, regular rhythm, S1 normal, S2 normal and normal heart sounds. No murmur heard. Pulses:       Radial pulses are 2+ on the right side, and 2+ on the left side. Posterior tibial pulses are 2+ on the right side, and 2+ on the left side. Pulmonary/Chest: Effort normal and breath sounds normal. No respiratory distress. She has no wheezes. She has no rhonchi. Abdominal: Soft. Bowel sounds are normal. She exhibits no distension. There is no hepatosplenomegaly. There is no tenderness. There is no rebound and no CVA tenderness. Musculoskeletal: She exhibits no edema or tenderness. Lymphadenopathy:     She has no cervical adenopathy. Neurological: She is alert and oriented to person, place, and time. She has normal strength. She exhibits normal muscle tone. Skin: Skin is warm, dry and intact. No rash noted. No erythema. Psychiatric: Her speech is normal and behavior is normal. Thought content normal. Her mood appears anxious. Nursing note and vitals reviewed. Assessment:      1. Essential hypertension  Veena Sagastume MD   2. Chest pain, unspecified type  Veena Sagastume MD         Plan:       Reviewed blood pressure log   Continue current dose of medication, continue to monitor blood pressure, and keep log  Bring log with her to her appointments  Bring in blood pressure cuff to compare to reading in office  Referral to cardiology per patient request  Recommend continue current medications, healthy diet, and regular exercise. Monitor for worsening symptoms   Call with concerns   Return in about 4 weeks (around 12/28/2017) for HTN, DIAB.       Urvashi received counseling on the following healthy behaviors: nutrition, exercise and medication adherence  Reviewed prior labs and health maintenance  Continue current medications, diet and exercise. Discussed use, benefit, and side effects of prescribed medications. Barriers to medication compliance addressed. Patient given educational materials - see patient instructions  Was a self-tracking handout given in paper form or via [employer]? No    Requested Prescriptions      No prescriptions requested or ordered in this encounter       All patient questions answered. Patient voiced understanding. Quality Measures    Body mass index is 32.22 kg/m². Elevated. Weight control planned discussed Healthy diet and regular exercise. BP: 136/82 Blood pressure is normal. Treatment plan consists of No treatment change needed.     Lab Results   Component Value Date    LDLCALC 100 04/12/2017    (goal LDL reduction with dx if diabetes is 50% LDL reduction)      PHQ Scores 6/9/2017   PHQ2 Score 0   PHQ9 Score 0     Interpretation of Total Score Depression Severity: 1-4 = Minimal depression, 5-9 = Mild depression, 10-14 = Moderate depression, 15-19 = Moderately severe depression, 20-27 = Severe depression

## 2017-11-30 NOTE — PATIENT INSTRUCTIONS
Patient Education        Chest Pain: Care Instructions  Your Care Instructions  There are many things that can cause chest pain. Some are not serious and will get better on their own in a few days. But some kinds of chest pain need more testing and treatment. Your doctor may have recommended a follow-up visit in the next 8 to 12 hours. If you are not getting better, you may need more tests or treatment. Even though your doctor has released you, you still need to watch for any problems. The doctor carefully checked you, but sometimes problems can develop later. If you have new symptoms or if your symptoms do not get better, get medical care right away. If you have worse or different chest pain or pressure that lasts more than 5 minutes or you passed out (lost consciousness), call 911 or seek other emergency help right away. A medical visit is only one step in your treatment. Even if you feel better, you still need to do what your doctor recommends, such as going to all suggested follow-up appointments and taking medicines exactly as directed. This will help you recover and help prevent future problems. How can you care for yourself at home? · Rest until you feel better. · Take your medicine exactly as prescribed. Call your doctor if you think you are having a problem with your medicine. · Do not drive after taking a prescription pain medicine. When should you call for help? Call 911 if:  · You passed out (lost consciousness). · You have severe difficulty breathing. · You have symptoms of a heart attack. These may include:  ¨ Chest pain or pressure, or a strange feeling in your chest.  ¨ Sweating. ¨ Shortness of breath. ¨ Nausea or vomiting. ¨ Pain, pressure, or a strange feeling in your back, neck, jaw, or upper belly or in one or both shoulders or arms. ¨ Lightheadedness or sudden weakness. ¨ A fast or irregular heartbeat.   After you call 911, the  may tell you to chew 1 adult-strength or 2 to 4 low-dose aspirin. Wait for an ambulance. Do not try to drive yourself. Call your doctor today if:  · You have any trouble breathing. · Your chest pain gets worse. · You are dizzy or lightheaded, or you feel like you may faint. · You are not getting better as expected. · You are having new or different chest pain. Where can you learn more? Go to [email or web address].inpepiceweb.Lala. org and sign in to your [employer] account. Enter A120 in the [employer] box to learn more about \"Chest Pain: Care Instructions. \"     If you do not have an account, please click on the \"Sign Up Now\" link. Current as of: March 20, 2017  Content Version: 11.3  © 5946-9231 [employer]. Care instructions adapted under license by [facility] ([facility]). If you have questions about a medical condition or this instruction, always ask your healthcare professional. [place] [address] any warranty or liability for your use of this information. Patient Education        High Blood Pressure: Care Instructions  Your Care Instructions  If your blood pressure is usually above 140/90, you have high blood pressure, or hypertension. That means the top number is 140 or higher or the bottom number is 90 or higher, or both. Despite what a lot of people think, high blood pressure usually doesn't cause headaches or make you feel dizzy or lightheaded. It usually has no symptoms. But it does increase your risk for heart attack, stroke, and kidney or eye damage. The higher your blood pressure, the more your risk increases. Your doctor will give you a goal for your blood pressure. Your goal will be based on your health and your age. An example of a goal is to keep your blood pressure below 140/90. Lifestyle changes, such as eating healthy and being active, are always important to help lower blood pressure.  You might also take medicine to reach your blood pressure goal.  Follow-up care is a key part of your treatment and safety. Be sure to make and go to all appointments, and call your doctor if you are having problems. It's also a good idea to know your test results and keep a list of the medicines you take. How can you care for yourself at home? Medical treatment  · If you stop taking your medicine, your blood pressure will go back up. You may take one or more types of medicine to lower your blood pressure. Be safe with medicines. Take your medicine exactly as prescribed. Call your doctor if you think you are having a problem with your medicine. · Talk to your doctor before you start taking aspirin every day. Aspirin can help certain people lower their risk of a heart attack or stroke. But taking aspirin isn't right for everyone, because it can cause serious bleeding. · See your doctor regularly. You may need to see the doctor more often at first or until your blood pressure comes down. · If you are taking blood pressure medicine, talk to your doctor before you take decongestants or anti-inflammatory medicine, such as ibuprofen. Some of these medicines can raise blood pressure. · Learn how to check your blood pressure at home. Lifestyle changes  · Stay at a healthy weight. This is especially important if you put on weight around the waist. Losing even 10 pounds can help you lower your blood pressure. · If your doctor recommends it, get more exercise. Walking is a good choice. Bit by bit, increase the amount you walk every day. Try for at least 30 minutes on most days of the week. You also may want to swim, bike, or do other activities. · Avoid or limit alcohol. Talk to your doctor about whether you can drink any alcohol. · Try to limit how much sodium you eat to less than 2,300 milligrams (mg) a day. Your doctor may ask you to try to eat less than 1,500 mg a day. · Eat plenty of fruits (such as bananas and oranges), vegetables, legumes, whole grains, and low-fat dairy products.   · Lower the amount of saturated fat in your diet. Saturated fat is found in animal products such as milk, cheese, and meat. Limiting these foods may help you lose weight and also lower your risk for heart disease. · Do not smoke. Smoking increases your risk for heart attack and stroke. If you need help quitting, talk to your doctor about stop-smoking programs and medicines. These can increase your chances of quitting for good. When should you call for help? Call 911 anytime you think you may need emergency care. This may mean having symptoms that suggest that your blood pressure is causing a serious heart or blood vessel problem. Your blood pressure may be over 180/110. For example, call 911 if:  · You have symptoms of a heart attack. These may include:  ¨ Chest pain or pressure, or a strange feeling in the chest.  ¨ Sweating. ¨ Shortness of breath. ¨ Nausea or vomiting. ¨ Pain, pressure, or a strange feeling in the back, neck, jaw, or upper belly or in one or both shoulders or arms. ¨ Lightheadedness or sudden weakness. ¨ A fast or irregular heartbeat. · You have symptoms of a stroke. These may include:  ¨ Sudden numbness, tingling, weakness, or loss of movement in your face, arm, or leg, especially on only one side of your body. ¨ Sudden vision changes. ¨ Sudden trouble speaking. ¨ Sudden confusion or trouble understanding simple statements. ¨ Sudden problems with walking or balance. ¨ A sudden, severe headache that is different from past headaches. · You have severe back or belly pain. Do not wait until your blood pressure comes down on its own. Get help right away. Call your doctor now or seek immediate care if:  · Your blood pressure is much higher than normal (such as 180/110 or higher), but you don't have symptoms. · You think high blood pressure is causing symptoms, such as:  ¨ Severe headache. ¨ Blurry vision.   Watch closely for changes in your health, and be sure to contact your doctor if:  · Your blood

## 2017-12-04 DIAGNOSIS — F41.1 GAD (GENERALIZED ANXIETY DISORDER): ICD-10-CM

## 2017-12-04 NOTE — TELEPHONE ENCOUNTER
From: Althea Call  Sent: 12/4/2017 10:19 AM EST  Subject: Medication Renewal Request    Urvashi Mtz would like a refill of the following medications:  ALPRAZolam Samluis f Vizcarra) 0.25 MG tablet Jesus Angulo CNP]    [facility] [place] [phone] F [phone]    Comment:

## 2017-12-05 LAB
ALBUMIN SERPL-MCNC: 4.6 G/DL
ALP BLD-CCNC: 72 U/L
ALT SERPL-CCNC: 29 U/L
ANION GAP SERPL CALCULATED.3IONS-SCNC: NORMAL MMOL/L
AST SERPL-CCNC: 18 U/L
AVERAGE GLUCOSE: NORMAL
BILIRUB SERPL-MCNC: 1 MG/DL (ref 0.1–1.4)
BUN BLDV-MCNC: 14 MG/DL
CALCIUM SERPL-MCNC: 9.9 MG/DL
CHLORIDE BLD-SCNC: 98 MMOL/L
CHOLESTEROL, TOTAL: 144 MG/DL
CHOLESTEROL/HDL RATIO: 3.7
CO2: 31 MMOL/L
CREAT SERPL-MCNC: 0.71 MG/DL
GFR CALCULATED: 98
GLUCOSE BLD-MCNC: 137 MG/DL
HBA1C MFR BLD: 6.9 %
HDLC SERPL-MCNC: 39 MG/DL (ref 35–70)
LDL CHOLESTEROL CALCULATED: 84 MG/DL (ref 0–160)
POTASSIUM SERPL-SCNC: 4.5 MMOL/L
SODIUM BLD-SCNC: 138 MMOL/L
TOTAL PROTEIN: 7.1
TRIGL SERPL-MCNC: 116 MG/DL
VLDLC SERPL CALC-MCNC: NORMAL MG/DL

## 2017-12-05 RX ORDER — ALPRAZOLAM 0.25 MG/1
0.25 TABLET ORAL 2 TIMES DAILY PRN
Qty: 10 TABLET | Refills: 0 | OUTPATIENT
Start: 2017-12-05 | End: 2018-01-04

## 2017-12-05 NOTE — TELEPHONE ENCOUNTER
Patient aware and states she will give the Elavil more time to see if it works as she has not seen much improvement as of yet but does states she has a lot going on right now. cm

## 2017-12-05 NOTE — TELEPHONE ENCOUNTER
Last refill was discussed to be one time script until the increase in elavil was helping. I do not recommend taking xanax regularly - it is habit forming and a controlled substance. Is her anxiety better?

## 2017-12-06 DIAGNOSIS — E11.9 TYPE 2 DIABETES MELLITUS WITHOUT COMPLICATION, WITHOUT LONG-TERM CURRENT USE OF INSULIN (HCC): ICD-10-CM

## 2017-12-06 DIAGNOSIS — I10 ESSENTIAL HYPERTENSION: ICD-10-CM

## 2017-12-21 DIAGNOSIS — E78.1 PURE HYPERGLYCERIDEMIA: ICD-10-CM

## 2017-12-21 DIAGNOSIS — E11.9 TYPE 2 DIABETES MELLITUS WITHOUT COMPLICATION, WITHOUT LONG-TERM CURRENT USE OF INSULIN (HCC): ICD-10-CM

## 2017-12-21 RX ORDER — ATORVASTATIN CALCIUM 20 MG/1
20 TABLET, FILM COATED ORAL DAILY
Qty: 90 TABLET | Refills: 1 | Status: SHIPPED | OUTPATIENT
Start: 2017-12-21 | End: 2018-06-21 | Stop reason: SDUPTHER

## 2017-12-21 NOTE — TELEPHONE ENCOUNTER
Last refill was 6/9/2017 #90-1  Last visit was 11/30/2017 RTO 4 weeks  Health Maintenance   Topic Date Due    Diabetic retinal exam  03/30/1975    DTaP/Tdap/Td vaccine (1 - Tdap) 03/30/1984    Pneumococcal med risk (1 of 1 - PPSV23) 03/30/1984    Breast cancer screen  03/30/2015    Cervical cancer screen  04/16/2015    Flu vaccine (1) 09/03/2018 (Originally 9/1/2017)    Diabetic microalbuminuria test  04/18/2018    Diabetic foot exam  04/26/2018    Colon Cancer Screen FIT/FOBT  04/28/2018    Diabetic hemoglobin A1C test  12/05/2018    Lipid screen  12/05/2018    Hepatitis C screen  Completed    HIV screen  Completed             (applicable per patient's age: Cancer Screenings, Depression Screening, Fall Risk Screening, Immunizations)    Hemoglobin A1C (%)   Date Value   12/05/2017 6.9   04/12/2017 6.7   05/02/2016 6.2     Microalb/Crt.  Ratio (mcg/mg creat)   Date Value   04/18/2017 12     LDL Calculated (mg/dL)   Date Value   12/05/2017 84     AST (U/L)   Date Value   12/05/2017 18     ALT (U/L)   Date Value   12/05/2017 29     BUN (mg/dL)   Date Value   12/05/2017 14      (goal A1C is < 7)   (goal LDL is <100) need 30-50% reduction from baseline     BP Readings from Last 3 Encounters:   11/30/17 136/82   11/24/17 (!) 164/94   11/22/17 (!) 182/87    (goal /80)      All Future Testing planned in [facility]:  Lab Frequency Next Occurrence   Urinalysis with Microscopic Once 06/01/2017   MAURY DIGITAL SCREEN W CAD BILATERAL Once 12/24/2017       Next Visit Date:  Future Appointments  Date Time Provider Alphonse Nur   1/15/2018 10:20 AM [facility]            Patient Active Problem List:     Back pain     Spasm of muscle     Abnormal weight gain     Migraine     Tachycardia     DDD (degenerative disc disease), cervical     Labile blood pressure     Mild obstructive sleep apnea     DAV (generalized anxiety disorder)     Essential hypertension

## 2017-12-24 ENCOUNTER — HOSPITAL ENCOUNTER (EMERGENCY)
Facility: CLINIC | Age: 52
Discharge: HOME | End: 2017-12-24
Attending: EMERGENCY MEDICINE
Payer: COMMERCIAL

## 2017-12-24 VITALS
OXYGEN SATURATION: 96 % | SYSTOLIC BLOOD PRESSURE: 148 MMHG | BODY MASS INDEX: 32.39 KG/M2 | TEMPERATURE: 98.2 F | HEIGHT: 60 IN | HEART RATE: 95 BPM | WEIGHT: 165 LBS | RESPIRATION RATE: 16 BRPM | DIASTOLIC BLOOD PRESSURE: 60 MMHG

## 2017-12-24 DIAGNOSIS — L02.91 ABSCESS: Primary | ICD-10-CM

## 2017-12-24 PROCEDURE — 87070 CULTURE OTHR SPECIMN AEROBIC: CPT

## 2017-12-24 PROCEDURE — 10060 I&D ABSCESS SIMPLE/SINGLE: CPT

## 2017-12-24 PROCEDURE — 6370000000 HC RX 637 (ALT 250 FOR IP): Performed by: EMERGENCY MEDICINE

## 2017-12-24 PROCEDURE — 99282 EMERGENCY DEPT VISIT SF MDM: CPT

## 2017-12-24 PROCEDURE — 87205 SMEAR GRAM STAIN: CPT

## 2017-12-24 RX ORDER — DOXYCYCLINE 100 MG/1
100 CAPSULE ORAL EVERY 12 HOURS SCHEDULED
Status: DISCONTINUED | OUTPATIENT
Start: 2017-12-24 | End: 2017-12-24

## 2017-12-24 RX ORDER — DOXYCYCLINE 100 MG/1
100 CAPSULE ORAL EVERY 12 HOURS SCHEDULED
Status: DISCONTINUED | OUTPATIENT
Start: 2017-12-24 | End: 2017-12-24 | Stop reason: HOSPADM

## 2017-12-24 RX ORDER — DOXYCYCLINE 100 MG/1
100 TABLET ORAL 2 TIMES DAILY
Qty: 20 TABLET | Refills: 0 | Status: SHIPPED | OUTPATIENT
Start: 2017-12-24 | End: 2018-01-03

## 2017-12-24 RX ADMIN — DOXYCYCLINE 100 MG: 100 CAPSULE ORAL at 14:29

## 2017-12-24 ASSESSMENT — PAIN DESCRIPTION - ONSET: ONSET: SUDDEN

## 2017-12-24 ASSESSMENT — PAIN DESCRIPTION - DESCRIPTORS: DESCRIPTORS: SORE

## 2017-12-24 ASSESSMENT — PAIN DESCRIPTION - PAIN TYPE: TYPE: ACUTE PAIN

## 2017-12-24 ASSESSMENT — PAIN DESCRIPTION - FREQUENCY: FREQUENCY: CONTINUOUS

## 2017-12-24 ASSESSMENT — PAIN SCALES - GENERAL
PAINLEVEL_OUTOF10: 0
PAINLEVEL_OUTOF10: 8

## 2017-12-24 ASSESSMENT — PAIN DESCRIPTION - PROGRESSION: CLINICAL_PROGRESSION: GRADUALLY WORSENING

## 2017-12-24 ASSESSMENT — PAIN DESCRIPTION - LOCATION: LOCATION: BUTTOCKS

## 2017-12-24 NOTE — ED PROVIDER NOTES
[facility] ED  [address] [address]  Phone: [phone]  eMERGENCY dEPARTMENT eNCOUnter      Pt Name: Donell Truong  MRN: [id number]  [place] 1965  Date of evaluation: 12/24/2017      CHIEF COMPLAINT       Chief Complaint   Patient presents with    Abscess     L side of anus, states she has had for about 4 dyas. hurting the worst today. HISTORY OF PRESENT ILLNESS    Urvashi Mock is a 46 y.o. female who presents To the emergency department with a perirectal abscess. To the left side of the anus over the past 4 days progressively worsening. No fevers no drainage. She says the pain prevents her from urinating. No other complaints. Pain is worse with movementAnd she rates her pain 8/10. REVIEW OF SYSTEMS       Constitutional: No fevers or chills   HEENT: No sore throat, rhinorrhea, or earache   Eyes: No blurry vision or double vision no drainage   Cardiovascular: No chest pain or tachycardia   Respiratory: No wheezing or shortness of breath no cough   Gastrointestinal: No nausea, vomiting, diarrhea, constipation, or abdominal pain   : No hematuria or dysuria   Musculoskeletal: No swelling or pain   Skin: Perianal abscess  Neurological: No focal neurologic complaints, paresthesias, weakness, or headache     PAST MEDICAL HISTORY    has a past medical history of Cervical disc disease; Chronic back pain; Diabetes mellitus ([address] Fibromyalgia; and Hypertension. SURGICAL HISTORY      has a past surgical history that includes Cervical discectomy (07); Tubal ligation; Tonsillectomy; Hemorrhoid surgery; Breast enhancement surgery; and Cervical disc surgery (03/21/2104). CURRENT MEDICATIONS       Previous Medications    ALPRAZOLAM (XANAX) 0.25 MG TABLET    Take 1 tablet by mouth 2 times daily as needed for Anxiety .     AMITRIPTYLINE (ELAVIL) 10 MG TABLET    Take 2 tablets by mouth nightly    ASPIRIN EC 81 MG EC TABLET    Take 1 tablet by mouth daily    ATORVASTATIN (LIPITOR) 20 MG TABLET    Take 1 tablet by mouth daily    BLOOD GLUCOSE MONITORING SUPPL (ONE TOUCH ULTRA 2) W/DEVICE KIT    USE TO CHECK BLOOD SUGAR AS DIRECTED    BLOOD GLUCOSE MONITORING SUPPL NOHELIA    use check blood sugar as directed    CHOLECALCIFEROL (VITAMIN D) 2000 UNITS CAPS CAPSULE    Take 1 capsule by mouth daily. FLECTOR 1.3 % PATCH    Place 1.3 patches onto the skin    GLUCOSAMINE-CHONDROIT-VIT C-MN (GLUCOSAMINE 1500 COMPLEX PO)    Take 1,500 mg by mouth daily    GLUCOSE BLOOD (BLOOD GLUCOSE TEST STRIPS) STRP    Check blood sugars daily as directed. HYDROCHLOROTHIAZIDE (MICROZIDE) 12.5 MG CAPSULE    Take 1 capsule by mouth daily    LANCETS MISC    Please dispense lancets covered per insurance    LOSARTAN (COZAAR) 25 MG TABLET    Take 1 tablet by mouth daily    MAGNESIUM OXIDE (MAG-OX) 400 MG TABLET    Take 400 mg by mouth daily    MELATONIN 1 MG    Take 3 tablets by mouth daily as needed (sleep)    METFORMIN (GLUCOPHAGE-XR) 750 MG EXTENDED RELEASE TABLET    Take 1 tablet by mouth daily (with breakfast)    OMEGA 3 1000 MG CAPS    Take  by mouth daily. OXYCODONE-ACETAMINOPHEN (PERCOCET) 5-325 MG PER TABLET    Take 1 tablet by mouth 3 times daily as needed    SACCHAROMYCES BOULARDII (PROBIOTIC) 250 MG CAPS    Take 250 mg by mouth daily    TIZANIDINE (ZANAFLEX) 2 MG TABLET    Take 2 mg by mouth 2 times daily as needed    VITAMIN C (ASCORBIC ACID) 500 MG TABLET    Take 500 mg by mouth daily       ALLERGIES     is allergic to penicillins. FAMILY HISTORY     indicated that her mother is alive. She indicated that her father is alive. She indicated that the status of her brother is unknown.      family history includes Diabetes in her father and mother; Heart Disease in her mother; High Blood Pressure in her father; Stroke in her brother. SOCIAL HISTORY      reports that she quit smoking about 6 years ago. Her smoking use included Cigarettes. She has a 30.00 pack-year smoking history.  She has 100 MG tablet     Sig: Take 1 tablet by mouth 2 times daily for 10 days     Dispense:  20 tablet     Refill:  0        Vitals:    Vitals:    12/24/17 1336   BP: (!) 148/60   Pulse: 95   Resp: 16   Temp: 98.2 °F (36.8 °C)   TempSrc: Oral   SpO2: 96%   Weight: 74.8 kg (165 lb)   Height: 5' (1.524 m)     -------------------------  BP (!) 148/60   Pulse 95   Temp 98.2 °F (36.8 °C) (Oral)   Resp 16   Ht 5' (1.524 m)   Wt 74.8 kg (165 lb)   LMP 12/14/2017   SpO2 96%   BMI 32.22 kg/m²     Follow-up for reevaluation. Watch for signs of worsening infection including worsening swelling, pain, fevers or any other concerns. I have reviewed the disposition diagnosis with the patient and or their family/guardian. I have answered their questions and given discharge instructions. They voiced understanding of these instructions and did not have any further questions or complaints. CRITICAL CARE:    None    CONSULTS:    None    PROCEDURES:    Placed in a left side position. Procedure performed with nurse Natalio Anguiano at bedside. Hibiclens used to clean the area. 1% lidocaine used to anesthetize the area approximately 1 mL.  11 blade scalpel used to open the wound 1 cm. Large amount of pus drained. Shallow no packing at this time. Dressing applied. OARRS Report if indicated             FINAL IMPRESSION      1.  Abscess          DISPOSITION/PLAN   DISPOSITION Decision To Discharge 12/24/2017 02:13:17 PM        PATIENT REFERRED TO:  Mook Sykes MD  [address]   [phone]    Schedule an appointment as soon as possible for a visit in 3 days        DISCHARGE MEDICATIONS:  New Prescriptions    DOXYCYCLINE MONOHYDRATE (ADOXA) 100 MG TABLET    Take 1 tablet by mouth 2 times daily for 10 days       (Please note that portions of this note were completed with a voice recognition program.  Efforts were made to edit the dictations but occasionally words are mis-transcribed.)    Dwaine Taylor Rena Mendez,, DO  Attending Emergency Physician        Jennifer Pereira, DO  12/24/17 2037

## 2017-12-24 NOTE — ED NOTES
Dr Harrington Massed at bedside. Cleansing site and injected w lidocaine. Immediately wound is draining pus. Wound culture obtained. Lancing not needed. Wound finished draining. Site covered.       Rosa Balderas RN  12/24/17 [id number]

## 2017-12-24 NOTE — ED NOTES
Patient arrives to the ED for complaints of an abscess. States it has been there for about 4 days. Hurting worse today, denies any drainage that she's noticed. Denies any fever, SOB. L side of anus noted with small, red area. white head observed as well. Tender to touch. Patient resting quietly, call light in reach.      Rita Jamison RN  12/24/17 7552

## 2017-12-26 LAB
CULTURE: ABNORMAL
CULTURE: ABNORMAL
DIRECT EXAM: ABNORMAL
DIRECT EXAM: ABNORMAL
Lab: ABNORMAL
SPECIMEN DESCRIPTION: ABNORMAL
SPECIMEN DESCRIPTION: ABNORMAL
STATUS: ABNORMAL

## 2017-12-27 DIAGNOSIS — E11.9 TYPE 2 DIABETES MELLITUS WITHOUT COMPLICATION, WITHOUT LONG-TERM CURRENT USE OF INSULIN (HCC): ICD-10-CM

## 2017-12-27 RX ORDER — GLUCOSAMINE HCL/CHONDROITIN SU 500-400 MG
CAPSULE ORAL
Qty: 50 STRIP | Refills: 11 | Status: SHIPPED | OUTPATIENT
Start: 2017-12-27 | End: 2018-12-27

## 2017-12-27 NOTE — TELEPHONE ENCOUNTER
Last OV 11/30, Last refill 10/10/17. BP  Health Maintenance   Topic Date Due    Diabetic retinal exam  03/30/1975    DTaP/Tdap/Td vaccine (1 - Tdap) 03/30/1984    Pneumococcal med risk (1 of 1 - PPSV23) 03/30/1984    Breast cancer screen  03/30/2015    Cervical cancer screen  04/16/2015    Flu vaccine (1) 09/03/2018 (Originally 9/1/2017)    Diabetic microalbuminuria test  04/18/2018    Diabetic foot exam  04/26/2018    Colon Cancer Screen FIT/FOBT  04/28/2018    Diabetic hemoglobin A1C test  12/05/2018    Lipid screen  12/05/2018    Hepatitis C screen  Completed    HIV screen  Completed             (applicable per patient's age: Cancer Screenings, Depression Screening, Fall Risk Screening, Immunizations)    Hemoglobin A1C (%)   Date Value   12/05/2017 6.9   04/12/2017 6.7   05/02/2016 6.2     Microalb/Crt.  Ratio (mcg/mg creat)   Date Value   04/18/2017 12     LDL Calculated (mg/dL)   Date Value   12/05/2017 84     AST (U/L)   Date Value   12/05/2017 18     ALT (U/L)   Date Value   12/05/2017 29     BUN (mg/dL)   Date Value   12/05/2017 14      (goal A1C is < 7)   (goal LDL is <100) need 30-50% reduction from baseline     BP Readings from Last 3 Encounters:   12/24/17 (!) 148/60   11/30/17 136/82   11/24/17 (!) 164/94    (goal /80)      All Future Testing planned in [facility]:  Lab Frequency Next Occurrence   Urinalysis with Microscopic Once 06/01/2017   MAURY DIGITAL SCREEN W CAD BILATERAL Once 12/24/2017       Next Visit Date:  Future Appointments  Date Time Provider Alphonse Nur   1/15/2018 10:20 AM AILEEN Perla [facility]            Patient Active Problem List:     Back pain     Spasm of muscle     Abnormal weight gain     Migraine     Tachycardia     DDD (degenerative disc disease), cervical     Labile blood pressure     Mild obstructive sleep apnea     DAV (generalized anxiety disorder)     Essential hypertension

## 2017-12-27 NOTE — TELEPHONE ENCOUNTER
From: Lois Bolanos  Sent: 12/27/2017 9:59 AM EST  Subject: Medication Renewal Request    Urvashi Coates would like a refill of the following medications:  Glucose Blood (BLOOD GLUCOSE TEST STRIPS) MARYP Marlene Geiger CNP]    Preferred pharmacy: [facility] [address] [facility], [address] [facility] [phone] - F [phone]    Comment:

## 2018-01-15 ENCOUNTER — OFFICE VISIT (OUTPATIENT)
Dept: FAMILY MEDICINE CLINIC | Age: 53
End: 2018-01-15
Payer: COMMERCIAL

## 2018-01-15 VITALS
TEMPERATURE: 98 F | BODY MASS INDEX: 31.83 KG/M2 | RESPIRATION RATE: 20 BRPM | SYSTOLIC BLOOD PRESSURE: 138 MMHG | WEIGHT: 163 LBS | DIASTOLIC BLOOD PRESSURE: 77 MMHG | HEART RATE: 102 BPM

## 2018-01-15 DIAGNOSIS — F41.1 GAD (GENERALIZED ANXIETY DISORDER): ICD-10-CM

## 2018-01-15 DIAGNOSIS — G47.00 INSOMNIA, UNSPECIFIED TYPE: ICD-10-CM

## 2018-01-15 DIAGNOSIS — I10 ESSENTIAL HYPERTENSION: Primary | ICD-10-CM

## 2018-01-15 DIAGNOSIS — E11.9 TYPE 2 DIABETES MELLITUS WITHOUT COMPLICATION, WITHOUT LONG-TERM CURRENT USE OF INSULIN (HCC): ICD-10-CM

## 2018-01-15 PROCEDURE — G8484 FLU IMMUNIZE NO ADMIN: HCPCS | Performed by: NURSE PRACTITIONER

## 2018-01-15 PROCEDURE — 99214 OFFICE O/P EST MOD 30 MIN: CPT | Performed by: NURSE PRACTITIONER

## 2018-01-15 PROCEDURE — 3046F HEMOGLOBIN A1C LEVEL >9.0%: CPT | Performed by: NURSE PRACTITIONER

## 2018-01-15 PROCEDURE — 1036F TOBACCO NON-USER: CPT | Performed by: NURSE PRACTITIONER

## 2018-01-15 PROCEDURE — 3014F SCREEN MAMMO DOC REV: CPT | Performed by: NURSE PRACTITIONER

## 2018-01-15 PROCEDURE — 3017F COLORECTAL CA SCREEN DOC REV: CPT | Performed by: NURSE PRACTITIONER

## 2018-01-15 PROCEDURE — G8427 DOCREV CUR MEDS BY ELIG CLIN: HCPCS | Performed by: NURSE PRACTITIONER

## 2018-01-15 PROCEDURE — G8417 CALC BMI ABV UP PARAM F/U: HCPCS | Performed by: NURSE PRACTITIONER

## 2018-01-15 RX ORDER — LOSARTAN POTASSIUM 50 MG/1
50 TABLET ORAL DAILY
Qty: 90 TABLET | Refills: 1 | Status: SHIPPED | OUTPATIENT
Start: 2018-01-15 | End: 2018-07-18 | Stop reason: SDUPTHER

## 2018-01-15 RX ORDER — HYDROCHLOROTHIAZIDE 12.5 MG/1
12.5 CAPSULE, GELATIN COATED ORAL DAILY
Qty: 90 CAPSULE | Refills: 1 | Status: SHIPPED | OUTPATIENT
Start: 2018-01-15 | End: 2018-07-18 | Stop reason: SDUPTHER

## 2018-01-15 RX ORDER — METFORMIN HYDROCHLORIDE 750 MG/1
750 TABLET, EXTENDED RELEASE ORAL 2 TIMES DAILY WITH MEALS
Qty: 90 TABLET | Refills: 1 | Status: SHIPPED | OUTPATIENT
Start: 2018-01-15 | End: 2018-07-18 | Stop reason: SDUPTHER

## 2018-01-15 RX ORDER — BUSPIRONE HYDROCHLORIDE 10 MG/1
10 TABLET ORAL 3 TIMES DAILY PRN
Qty: 90 TABLET | Refills: 1 | Status: SHIPPED | OUTPATIENT
Start: 2018-01-15 | End: 2018-11-07

## 2018-01-15 ASSESSMENT — ENCOUNTER SYMPTOMS
DIARRHEA: 0
CONSTIPATION: 0
WHEEZING: 0
VOMITING: 0
SHORTNESS OF BREATH: 1
NAUSEA: 0
ABDOMINAL PAIN: 0
CHEST TIGHTNESS: 1
COUGH: 0

## 2018-01-15 NOTE — PROGRESS NOTES
which does not help. Blood pressure is increased today - will increase medication. Blood sugar log shows that sugars have also been increased - will increase her metformin to twice daily today. She is not sleeping. Elavil increase initially help but nothing seems to be working with all of her stress. santa       Hypertension   This is a chronic problem. The current episode started more than 1 year ago. The problem has been waxing and waning since onset. The problem is controlled. Associated symptoms include anxiety, headaches, palpitations and shortness of breath (not today). Pertinent negatives include no orthopnea or peripheral edema. Chest pain: intermittent. Risk factors for coronary artery disease include diabetes mellitus, dyslipidemia, family history and obesity. Past treatments include angiotensin blockers, diuretics and lifestyle changes. The current treatment provides mild improvement. Compliance problems include exercise and diet. There is no history of CAD/MI, CVA or PVD. Diabetes   She presents for her follow-up diabetic visit. She has type 2 diabetes mellitus. No MedicAlert identification noted. Her disease course has been stable. Hypoglycemia symptoms include headaches and nervousness/anxiousness. Pertinent negatives for hypoglycemia include no dizziness or seizures. There are no diabetic associated symptoms. Pertinent negatives for diabetes include no weakness. Chest pain: intermittent. Symptoms are stable. Pertinent negatives for diabetic complications include no CVA or PVD. Risk factors for coronary artery disease include sedentary lifestyle, stress, dyslipidemia, diabetes mellitus, family history, hypertension and obesity. Current diabetic treatment includes oral agent (monotherapy). She is compliant with treatment most of the time. Her weight is stable. She is following a generally healthy diet. Meal planning includes avoidance of concentrated sweets.  She has not had a previous visit with a regular exercise. Monitor for worsening symptoms   Call with concerns   Return in about 4 weeks (around 2/12/2018) for anxiety. Urvashi received counseling on the following healthy behaviors: nutrition, exercise and medication adherence  Reviewed prior labs and health maintenance  Continue current medications, diet and exercise. Discussed use, benefit, and side effects of prescribed medications. Barriers to medication compliance addressed. Patient given educational materials - see patient instructions  Was a self-tracking handout given in paper form or via [employer]? No    Requested Prescriptions     Signed Prescriptions Disp Refills    losartan (COZAAR) 50 MG tablet 90 tablet 1     Sig: Take 1 tablet by mouth daily    hydrochlorothiazide (MICROZIDE) 12.5 MG capsule 90 capsule 1     Sig: Take 1 capsule by mouth daily    metFORMIN (GLUCOPHAGE-XR) 750 MG extended release tablet 90 tablet 1     Sig: Take 1 tablet by mouth 2 times daily (with meals)    Suvorexant 10 MG TABS 30 tablet 0     Sig: Take 10 mg by mouth nightly as needed (sleep).  busPIRone (BUSPAR) 10 MG tablet 90 tablet 1     Sig: Take 1 tablet by mouth 3 times daily as needed (anxiety)       All patient questions answered. Patient voiced understanding. Quality Measures    Body mass index is 31.83 kg/m². Elevated. Weight control planned discussed Healthy diet and regular exercise. BP: 138/77 Blood pressure is normal. Treatment plan consists of No treatment change needed.     Lab Results   Component Value Date    LDLCALC 84 12/05/2017    (goal LDL reduction with dx if diabetes is 50% LDL reduction)      PHQ Scores 6/9/2017   PHQ2 Score 0   PHQ9 Score 0     Interpretation of Total Score Depression Severity: 1-4 = Minimal depression, 5-9 = Mild depression, 10-14 = Moderate depression, 15-19 = Moderately severe depression, 20-27 = Severe depression

## 2018-01-15 NOTE — PATIENT INSTRUCTIONS
Patient Education        Anxiety Disorder: Care Instructions  Your Care Instructions    Anxiety is a normal reaction to stress. Difficult situations can cause you to have symptoms such as sweaty palms and a nervous feeling. In an anxiety disorder, the symptoms are far more severe. Constant worry, muscle tension, trouble sleeping, nausea and diarrhea, and other symptoms can make normal daily activities difficult or impossible. These symptoms may occur for no reason, and they can affect your work, school, or social life. Medicines, counseling, and self-care can all help. Follow-up care is a key part of your treatment and safety. Be sure to make and go to all appointments, and call your doctor if you are having problems. It's also a good idea to know your test results and keep a list of the medicines you take. How can you care for yourself at home? · Take medicines exactly as directed. Call your doctor if you think you are having a problem with your medicine. · Go to your counseling sessions and follow-up appointments. · Recognize and accept your anxiety. Then, when you are in a situation that makes you anxious, say to yourself, \"This is not an emergency. I feel uncomfortable, but I am not in danger. I can keep going even if I feel anxious. \"  · Be kind to your body:  ¨ Relieve tension with exercise or a massage. ¨ Get enough rest.  ¨ Avoid alcohol, caffeine, nicotine, and illegal drugs. They can increase your anxiety level and cause sleep problems. ¨ Learn and do relaxation techniques. See below for more about these techniques. · Engage your mind. Get out and do something you enjoy. Go to a funny movie, or take a walk or hike. Plan your day. Having too much or too little to do can make you anxious. · Keep a record of your symptoms. Discuss your fears with a good friend or family member, or join a support group for people with similar problems. Talking to others sometimes relieves stress.   · Get involved in easy to get the recommended amount of fruits and vegetables each day. ¨ Try yogurt topped with fruit and nuts for a snack or healthy dessert. ¨ Add lettuce, tomato, cucumber, and onion to sandwiches. ¨ Combine a ready-made pizza crust with low-fat mozzarella cheese and lots of vegetable toppings. Try using tomatoes, squash, spinach, broccoli, carrots, cauliflower, and onions. ¨ Have a variety of cut-up vegetables with a low-fat dip as an appetizer instead of chips and dip. ¨ Sprinkle sunflower seeds or chopped almonds over salads. Or try adding chopped walnuts or almonds to cooked vegetables. ¨ Try some vegetarian meals using beans and peas. Add garbanzo or kidney beans to salads. Make burritos and tacos with mashed lopez beans or black beans. Where can you learn more? Go to https://[employer]. org and sign in to your [employer] account. Enter E484 in the [employer] box to learn more about \"DASH Diet: Care Instructions. \"     If you do not have an account, please click on the \"Sign Up Now\" link. Current as of: September 21, 2016  Content Version: 11.5  © 8815-1284 Healthwise, Incorporated. Care instructions adapted under license by [facility] ([facility]). If you have questions about a medical condition or this instruction, always ask your healthcare professional. [place] [address] any warranty or liability for your use of this information.

## 2018-01-20 PROBLEM — G47.00 INSOMNIA: Status: ACTIVE | Noted: 2018-01-20

## 2018-01-20 PROBLEM — E11.9 TYPE 2 DIABETES MELLITUS WITHOUT COMPLICATION, WITHOUT LONG-TERM CURRENT USE OF INSULIN (HCC): Status: ACTIVE | Noted: 2018-01-20

## 2018-01-20 ASSESSMENT — ENCOUNTER SYMPTOMS: ORTHOPNEA: 0

## 2018-02-03 DIAGNOSIS — F41.1 GAD (GENERALIZED ANXIETY DISORDER): ICD-10-CM

## 2018-02-05 RX ORDER — AMITRIPTYLINE HYDROCHLORIDE 10 MG/1
20 TABLET, FILM COATED ORAL NIGHTLY
Qty: 60 TABLET | Refills: 5 | Status: SHIPPED | OUTPATIENT
Start: 2018-02-05 | End: 2018-09-19 | Stop reason: SDUPTHER

## 2018-06-20 ENCOUNTER — TELEPHONE (OUTPATIENT)
Dept: FAMILY MEDICINE CLINIC | Age: 53
End: 2018-06-20

## 2018-06-20 DIAGNOSIS — E11.9 TYPE 2 DIABETES MELLITUS WITHOUT COMPLICATION, WITHOUT LONG-TERM CURRENT USE OF INSULIN (HCC): ICD-10-CM

## 2018-06-20 DIAGNOSIS — E78.1 PURE HYPERGLYCERIDEMIA: ICD-10-CM

## 2018-06-21 RX ORDER — ATORVASTATIN CALCIUM 20 MG/1
20 TABLET, FILM COATED ORAL DAILY
Qty: 90 TABLET | Refills: 1 | Status: SHIPPED | OUTPATIENT
Start: 2018-06-21 | End: 2018-11-08 | Stop reason: SDUPTHER

## 2018-07-11 ENCOUNTER — HOSPITAL ENCOUNTER (EMERGENCY)
Facility: CLINIC | Age: 53
Discharge: HOME | End: 2018-07-11
Attending: EMERGENCY MEDICINE
Payer: COMMERCIAL

## 2018-07-11 VITALS
OXYGEN SATURATION: 96 % | HEART RATE: 95 BPM | BODY MASS INDEX: 32.39 KG/M2 | WEIGHT: 165 LBS | RESPIRATION RATE: 14 BRPM | HEIGHT: 60 IN | SYSTOLIC BLOOD PRESSURE: 179 MMHG | TEMPERATURE: 98.2 F | DIASTOLIC BLOOD PRESSURE: 80 MMHG

## 2018-07-11 DIAGNOSIS — L02.91 ABSCESS: Primary | ICD-10-CM

## 2018-07-11 PROCEDURE — 2500000003 HC RX 250 WO HCPCS: Performed by: EMERGENCY MEDICINE

## 2018-07-11 PROCEDURE — 10060 I&D ABSCESS SIMPLE/SINGLE: CPT

## 2018-07-11 PROCEDURE — 99282 EMERGENCY DEPT VISIT SF MDM: CPT

## 2018-07-11 RX ORDER — DOXYCYCLINE 100 MG/1
100 TABLET ORAL 2 TIMES DAILY
Qty: 20 TABLET | Refills: 0 | Status: SHIPPED | OUTPATIENT
Start: 2018-07-11 | End: 2018-07-21

## 2018-07-11 RX ORDER — LIDOCAINE HYDROCHLORIDE 10 MG/ML
20 INJECTION, SOLUTION INFILTRATION; PERINEURAL ONCE
Status: COMPLETED | OUTPATIENT
Start: 2018-07-11 | End: 2018-07-11

## 2018-07-11 RX ADMIN — LIDOCAINE HYDROCHLORIDE 20 ML: 10 INJECTION, SOLUTION INFILTRATION; PERINEURAL at 17:02

## 2018-07-11 ASSESSMENT — PAIN SCALES - GENERAL
PAINLEVEL_OUTOF10: 4
PAINLEVEL_OUTOF10: 4

## 2018-07-11 ASSESSMENT — PAIN DESCRIPTION - LOCATION: LOCATION: BUTTOCKS

## 2018-07-11 ASSESSMENT — PAIN DESCRIPTION - PAIN TYPE: TYPE: ACUTE PAIN

## 2018-07-11 ASSESSMENT — PAIN DESCRIPTION - FREQUENCY: FREQUENCY: CONTINUOUS

## 2018-07-11 ASSESSMENT — PAIN DESCRIPTION - DESCRIPTORS: DESCRIPTORS: TENDER;SORE

## 2018-07-11 ASSESSMENT — PAIN DESCRIPTION - ORIENTATION: ORIENTATION: LEFT

## 2018-07-11 NOTE — ED NOTES
Abscess noted to left buttock, redness noted. Pt c/o pain upon assess.      Dexter Gonzalez RN  07/11/18 1351

## 2018-07-11 NOTE — ED PROVIDER NOTES
[facility]  [address] [address]  Phone: [phone]        Pt Name: Alyssa Harris  MRN: [id number]  [place] 1965  Date of evaluation: 7/11/18      CHIEF COMPLAINT       Chief Complaint   Patient presents with    Abscess         HISTORY OF PRESENT ILLNESS  (Location/Symptom, Timing/Onset, Context/Setting, Quality, Duration, Modifying Factors, Severity.)    Alyssa Harris is a 48 y.o. female who presents With pressure to the left buttocks. The patient states she is previously had a cyst in the left buttocks. The patient states for the last 23 weeks. She has had pressure in this region with some occasional drainage, no fever no chills no abdominal pain, pressure to the area makes her symptoms worse nothing makes it better       REVIEW OF SYSTEMS    (2-9 systems for level 4, 10 or more for level 5)     Review of Systems   Constitutional: Negative for chills and fever. Musculoskeletal:        Left buttocks discomfort   Skin:        Swelling to the left buttocks       PAST MEDICAL HISTORY    has a past medical history of Cervical disc disease; Chronic back pain; Diabetes mellitus ([address] Fibromyalgia; and Hypertension. SURGICAL HISTORY      has a past surgical history that includes Cervical discectomy (07); Tubal ligation; Tonsillectomy; Hemorrhoid surgery; Breast enhancement surgery; and Cervical disc surgery (03/21/2104).     CURRENT MEDICATIONS       Previous Medications    AMITRIPTYLINE (ELAVIL) 10 MG TABLET    Take 2 tablets by mouth nightly    ASPIRIN EC 81 MG EC TABLET    Take 1 tablet by mouth daily    ATORVASTATIN (LIPITOR) 20 MG TABLET    Take 1 tablet by mouth daily    BLOOD GLUCOSE MONITORING SUPPL (ONE TOUCH ULTRA 2) W/DEVICE KIT    USE TO CHECK BLOOD SUGAR AS DIRECTED    BLOOD GLUCOSE MONITORING SUPPL NOHELIA    use check blood sugar as directed    BUSPIRONE (BUSPAR) 10 MG TABLET    Take 1 tablet by mouth 3 times daily as needed (anxiety)    CHOLECALCIFEROL (VITAMIN She is to return the ER for increasing pain, swelling, redness, fever or other concerns otherwise to follow-up with her family doctor within the next few days for wound check and possible packing removal  At this time the patient is without objective evidence of an acute process requiring hospitalization or inpatient management. They have remained hemodynamically stable throughout their entire ED visit and are stable for discharge with outpatient follow-up. The plan has been discussed in detail and they are aware of the specific conditions for emergent return, as well as the importance of follow-up    I have reviewed the disposition diagnosis with the patient and or their family/guardian. I have answered their questions and given discharge instructions. They voiced understanding of these instructions and did not have any further questions or complaints. PROCEDURES:  None    FINAL IMPRESSION      1. Abscess          DISPOSITION/PLAN   DISPOSITION Decision To Discharge 07/11/2018 05:31:13 PM      CONDITION ON DISPOSITION:   Improved - Stable    PATIENT REFERRED TO:  Amanda Schwab MD  [address]  [phone]    Call in 2 days        DISCHARGE MEDICATIONS:  New Prescriptions    DOXYCYCLINE MONOHYDRATE (ADOXA) 100 MG TABLET    Take 1 tablet by mouth 2 times daily for 10 days       (Please note that portions of this note were completed with a voice recognition program.  Efforts were made to edit the dictations but occasionally words are mis-transcribed.)    Luis Louis,, MD, F.A.C.E.P.   Attending Emergency Medicine Physician       Luis Louis MD  07/11/18 5614

## 2018-07-13 ENCOUNTER — HOSPITAL ENCOUNTER (EMERGENCY)
Facility: CLINIC | Age: 53
Discharge: HOME | End: 2018-07-13
Attending: EMERGENCY MEDICINE
Payer: COMMERCIAL

## 2018-07-13 VITALS
HEIGHT: 60 IN | OXYGEN SATURATION: 99 % | DIASTOLIC BLOOD PRESSURE: 83 MMHG | BODY MASS INDEX: 33.38 KG/M2 | RESPIRATION RATE: 15 BRPM | SYSTOLIC BLOOD PRESSURE: 172 MMHG | HEART RATE: 81 BPM | TEMPERATURE: 98.2 F | WEIGHT: 170 LBS

## 2018-07-13 DIAGNOSIS — Z51.89 WOUND CHECK, ABSCESS: Primary | ICD-10-CM

## 2018-07-13 PROCEDURE — 99282 EMERGENCY DEPT VISIT SF MDM: CPT

## 2018-07-13 NOTE — ED PROVIDER NOTES
[facility]  [address] [address]  Phone: [phone]        Pt Name: Narinder Johansen  MRN: [id number]  [place] 1965  Date of evaluation: 7/13/18      CHIEF COMPLAINT       Chief Complaint   Patient presents with    Abscess     buttock    Wound Check     pt seen Wed       HISTORY OF PRESENT ILLNESS  (Location/Symptom, Timing/Onset, Context/Setting, Quality, Duration, Modifying Factors, Severity.)    Narinder Johansen is a 48 y.o. female who presents Complaining of prior abscess to right buttock. She was seen here on Wednesday and had the abscess drained by Dr. Janace Burkitt. She is currently on doxycycline for it. She relates drainage was still there yesterday but has significantly lessened today. She relates it is feeling better. No fevers or chills. No nausea or vomiting. No other concerns at this time. REVIEW OF SYSTEMS    (2-9 systems for level 4, 10 or more for level 5)     Review of Systems   Skin: Positive for wound. All other systems reviewed and are negative. PAST MEDICAL HISTORY    has a past medical history of Cervical disc disease; Chronic back pain; Diabetes mellitus ([address] Fibromyalgia; and Hypertension. SURGICAL HISTORY      has a past surgical history that includes Cervical discectomy (07); Tubal ligation; Tonsillectomy; Hemorrhoid surgery; Breast enhancement surgery; and Cervical disc surgery (03/21/2104).     CURRENT MEDICATIONS       Previous Medications    AMITRIPTYLINE (ELAVIL) 10 MG TABLET    Take 2 tablets by mouth nightly    ASPIRIN EC 81 MG EC TABLET    Take 1 tablet by mouth daily    ATORVASTATIN (LIPITOR) 20 MG TABLET    Take 1 tablet by mouth daily    BLOOD GLUCOSE MONITORING SUPPL (ONE TOUCH ULTRA 2) W/DEVICE KIT    USE TO CHECK BLOOD SUGAR AS DIRECTED    BLOOD GLUCOSE MONITORING SUPPL NOHELIA    use check blood sugar as directed    BUSPIRONE (BUSPAR) 10 MG TABLET    Take 1 tablet by mouth 3 times daily as needed (anxiety)    CHOLECALCIFEROL (VITAMIN D) 2000 UNITS CAPS CAPSULE    Take 1 capsule by mouth daily. DOXYCYCLINE MONOHYDRATE (ADOXA) 100 MG TABLET    Take 1 tablet by mouth 2 times daily for 10 days    FLECTOR 1.3 % PATCH    Place 1.3 patches onto the skin    GLUCOSAMINE-CHONDROIT-VIT C-MN (GLUCOSAMINE 1500 COMPLEX PO)    Take 1,500 mg by mouth daily    GLUCOSE BLOOD (BLOOD GLUCOSE TEST STRIPS) STRP    Check blood sugars daily as directed. Dx E11.9    HYDROCHLOROTHIAZIDE (MICROZIDE) 12.5 MG CAPSULE    Take 1 capsule by mouth daily    LANCETS MISC    Please dispense lancets covered per insurance    LOSARTAN (COZAAR) 50 MG TABLET    Take 1 tablet by mouth daily    MAGNESIUM OXIDE (MAG-OX) 400 MG TABLET    Take 400 mg by mouth daily    MELATONIN 1 MG    Take 3 tablets by mouth daily as needed (sleep)    METFORMIN (GLUCOPHAGE-XR) 750 MG EXTENDED RELEASE TABLET    Take 1 tablet by mouth 2 times daily (with meals)    OMEGA 3 1000 MG CAPS    Take  by mouth daily. OXYCODONE-ACETAMINOPHEN (PERCOCET) 5-325 MG PER TABLET    Take 1 tablet by mouth 3 times daily as needed    SACCHAROMYCES BOULARDII (PROBIOTIC) 250 MG CAPS    Take 250 mg by mouth daily    SUVOREXANT 10 MG TABS    Take 10 mg by mouth nightly as needed (sleep). TIZANIDINE (ZANAFLEX) 2 MG TABLET    Take 2 mg by mouth 2 times daily as needed    VITAMIN C (ASCORBIC ACID) 500 MG TABLET    Take 500 mg by mouth daily       ALLERGIES     is allergic to penicillins. FAMILY HISTORY     indicated that her mother is alive. She indicated that her father is alive. She indicated that the status of her brother is unknown.      family history includes Diabetes in her father and mother; Heart Disease in her mother; High Blood Pressure in her father; Stroke in her brother. SOCIAL HISTORY      reports that she quit smoking about 6 years ago. Her smoking use included Cigarettes. She has a 30.00 pack-year smoking history.  She has never used smokeless tobacco. She reports that she does not drink alcohol or use drugs. PHYSICAL EXAM    (up to 7 for level 4, 8 or more for level 5)   INITIAL VITALS:  height is 5' (1.524 m) and weight is 77.1 kg (170 lb). Her oral temperature is 98.2 °F (36.8 °C). Her blood pressure is 172/83 (abnormal) and her pulse is 81. Her respiration is 15 and oxygen saturation is 99%. Physical Exam   Constitutional: She is oriented to person, place, and time. She appears well-developed and well-nourished. No distress. HENT:   Head: Normocephalic and atraumatic. Right Ear: External ear normal.   Left Ear: External ear normal.   Nose: Nose normal.   Eyes: Conjunctivae and EOM are normal. Pupils are equal, round, and reactive to light. Right eye exhibits no discharge. Left eye exhibits no discharge. Neck: Normal range of motion. Neck supple. Cardiovascular: Normal rate, regular rhythm and normal heart sounds. No murmur heard. Pulmonary/Chest: Effort normal and breath sounds normal. No respiratory distress. She has no wheezes. She has no rales. Abdominal: Soft. Bowel sounds are normal. She exhibits no distension. Musculoskeletal: Normal range of motion. Neurological: She is alert and oriented to person, place, and time. She exhibits normal muscle tone. Skin: Skin is warm. No rash noted. She is not diaphoretic. Well-healing right buttock abscess with no drainage currently noted. No erythema. Psychiatric: She has a normal mood and affect. Her behavior is normal.       DIFFERENTIAL DIAGNOSIS/ MDM:     I did discuss with the patient that I don't think she needs repacked at this time. She is comfortable with this plan and knows to follow up with family physician for any concerns. Plans on continuing antibiotics.     DIAGNOSTIC RESULTS     EKG: All EKG's are interpreted by the Emergency Department Physician who either signs or Co-signs this chart in the absence of a cardiologist.    None    RADIOLOGY:   Non-plain film images such as CT, Ultrasound and MRI are read

## 2018-07-18 DIAGNOSIS — I10 ESSENTIAL HYPERTENSION: ICD-10-CM

## 2018-07-18 DIAGNOSIS — E11.9 TYPE 2 DIABETES MELLITUS WITHOUT COMPLICATION, WITHOUT LONG-TERM CURRENT USE OF INSULIN (HCC): ICD-10-CM

## 2018-07-18 RX ORDER — HYDROCHLOROTHIAZIDE 12.5 MG/1
12.5 CAPSULE, GELATIN COATED ORAL DAILY
Qty: 90 CAPSULE | Refills: 1 | Status: SHIPPED | OUTPATIENT
Start: 2018-07-18 | End: 2018-11-07

## 2018-07-18 RX ORDER — LOSARTAN POTASSIUM 50 MG/1
50 TABLET ORAL DAILY
Qty: 90 TABLET | Refills: 1 | Status: SHIPPED | OUTPATIENT
Start: 2018-07-18 | End: 2018-11-07

## 2018-07-18 RX ORDER — METFORMIN HYDROCHLORIDE 750 MG/1
750 TABLET, EXTENDED RELEASE ORAL 2 TIMES DAILY WITH MEALS
Qty: 180 TABLET | Refills: 1 | Status: SHIPPED | OUTPATIENT
Start: 2018-07-18 | End: 2019-02-01 | Stop reason: SDUPTHER

## 2018-07-18 NOTE — TELEPHONE ENCOUNTER
LOV:1-15-18  ROV:4 weeks  Lm for pt to call RE refill   LRF:1-15-18  Health Maintenance   Topic Date Due    Diabetic retinal exam  03/30/1975    Breast cancer screen  03/30/2015    Shingles Vaccine (1 of 2 - 2 Dose Series) 03/30/2015    Cervical cancer screen  04/16/2015    Diabetic microalbuminuria test  04/18/2018    Diabetic foot exam  04/26/2018    Colon Cancer Screen FIT/FOBT  04/28/2018    Flu vaccine (1) 09/03/2018 (Originally 9/1/2018)    DTaP/Tdap/Td vaccine (1 - Tdap) 11/01/2018 (Originally 3/30/1984)    Pneumococcal med risk (1 of 1 - PPSV23) 11/01/2018 (Originally 3/30/1984)    A1C test (Diabetic or Prediabetic)  12/05/2018    Lipid screen  12/05/2018    Potassium monitoring  12/05/2018    Creatinine monitoring  12/05/2018    Hepatitis C screen  Completed    HIV screen  Completed             (applicable per patient's age: Cancer Screenings, Depression Screening, Fall Risk Screening, Immunizations)    Hemoglobin A1C (%)   Date Value   12/05/2017 6.9   04/12/2017 6.7   05/02/2016 6.2     Microalb/Crt.  Ratio (mcg/mg creat)   Date Value   04/18/2017 12     LDL Calculated (mg/dL)   Date Value   12/05/2017 84     AST (U/L)   Date Value   12/05/2017 18     ALT (U/L)   Date Value   12/05/2017 29     BUN (mg/dL)   Date Value   12/05/2017 14      (goal A1C is < 7)   (goal LDL is <100) need 30-50% reduction from baseline     BP Readings from Last 3 Encounters:   07/13/18 (!) 172/83   07/11/18 (!) 179/80   01/15/18 138/77    (goal /80)      All Future Testing planned in [facility]:  Lab Frequency Next Occurrence   Urinalysis with Microscopic Once 06/01/2017   MAURY DIGITAL SCREEN W CAD BILATERAL Once 12/24/2017   Comprehensive Metabolic Panel Once 17/26/3029   Hemoglobin A1C Once 03/06/2018   Lipid Panel Once 03/06/2018       Next Visit Date:  Future Appointments  Date Time Provider Alphonse Nur   7/31/2018 11:00 AM [facility]            Patient Active

## 2018-07-31 ENCOUNTER — OFFICE VISIT (OUTPATIENT)
Dept: FAMILY MEDICINE CLINIC | Age: 53
End: 2018-07-31
Payer: COMMERCIAL

## 2018-07-31 ENCOUNTER — HOSPITAL ENCOUNTER (OUTPATIENT)
Age: 53
Setting detail: SPECIMEN
Discharge: HOME | End: 2018-07-31
Payer: COMMERCIAL

## 2018-07-31 VITALS
TEMPERATURE: 98.6 F | DIASTOLIC BLOOD PRESSURE: 74 MMHG | SYSTOLIC BLOOD PRESSURE: 134 MMHG | WEIGHT: 169 LBS | BODY MASS INDEX: 33.01 KG/M2 | HEART RATE: 84 BPM | RESPIRATION RATE: 16 BRPM

## 2018-07-31 DIAGNOSIS — L02.31 ABSCESS OF LEFT BUTTOCK: Primary | ICD-10-CM

## 2018-07-31 DIAGNOSIS — L02.31 ABSCESS OF LEFT BUTTOCK: ICD-10-CM

## 2018-07-31 PROCEDURE — G8427 DOCREV CUR MEDS BY ELIG CLIN: HCPCS | Performed by: NURSE PRACTITIONER

## 2018-07-31 PROCEDURE — 3017F COLORECTAL CA SCREEN DOC REV: CPT | Performed by: NURSE PRACTITIONER

## 2018-07-31 PROCEDURE — 99213 OFFICE O/P EST LOW 20 MIN: CPT | Performed by: NURSE PRACTITIONER

## 2018-07-31 PROCEDURE — 1036F TOBACCO NON-USER: CPT | Performed by: NURSE PRACTITIONER

## 2018-07-31 PROCEDURE — G8417 CALC BMI ABV UP PARAM F/U: HCPCS | Performed by: NURSE PRACTITIONER

## 2018-07-31 ASSESSMENT — PATIENT HEALTH QUESTIONNAIRE - PHQ9
2. FEELING DOWN, DEPRESSED OR HOPELESS: 0
SUM OF ALL RESPONSES TO PHQ9 QUESTIONS 1 & 2: 0
1. LITTLE INTEREST OR PLEASURE IN DOING THINGS: 0
SUM OF ALL RESPONSES TO PHQ QUESTIONS 1-9: 0

## 2018-07-31 ASSESSMENT — ENCOUNTER SYMPTOMS
NAUSEA: 0
VOMITING: 0
COUGH: 0
SHORTNESS OF BREATH: 0
DIARRHEA: 0

## 2018-08-02 LAB
CULTURE: ABNORMAL
CULTURE: ABNORMAL
DIRECT EXAM: ABNORMAL
DIRECT EXAM: ABNORMAL
Lab: ABNORMAL
ORGANISM: ABNORMAL
SPECIMEN DESCRIPTION: ABNORMAL
STATUS: ABNORMAL

## 2018-08-03 DIAGNOSIS — L02.91 ABSCESS: Primary | ICD-10-CM

## 2018-08-03 RX ORDER — SULFAMETHOXAZOLE AND TRIMETHOPRIM 800; 160 MG/1; MG/1
1 TABLET ORAL 2 TIMES DAILY
Qty: 20 TABLET | Refills: 0 | Status: SHIPPED | OUTPATIENT
Start: 2018-08-03 | End: 2018-08-13

## 2018-08-08 ENCOUNTER — TELEPHONE (OUTPATIENT)
Dept: FAMILY MEDICINE CLINIC | Age: 53
End: 2018-08-08

## 2018-08-29 LAB
ALBUMIN SERPL-MCNC: 4.6 G/DL
ALP BLD-CCNC: 64 U/L
ALT SERPL-CCNC: 29 U/L
ANION GAP SERPL CALCULATED.3IONS-SCNC: NORMAL MMOL/L
AST SERPL-CCNC: 18 U/L
AVERAGE GLUCOSE: NORMAL
BILIRUB SERPL-MCNC: 1 MG/DL (ref 0.1–1.4)
BUN BLDV-MCNC: 19 MG/DL
CALCIUM SERPL-MCNC: 9.7 MG/DL
CHLORIDE BLD-SCNC: 100 MMOL/L
CHOLESTEROL, TOTAL: 149 MG/DL
CHOLESTEROL/HDL RATIO: 4
CO2: 29 MMOL/L
CREAT SERPL-MCNC: 0.69 MG/DL
GFR CALCULATED: NORMAL
GLUCOSE BLD-MCNC: 164 MG/DL
HBA1C MFR BLD: 7.5 %
HDLC SERPL-MCNC: 37 MG/DL (ref 35–70)
LDL CHOLESTEROL CALCULATED: 82 MG/DL (ref 0–160)
POTASSIUM SERPL-SCNC: 4.2 MMOL/L
SODIUM BLD-SCNC: 138 MMOL/L
TOTAL PROTEIN: 7
TRIGL SERPL-MCNC: 210 MG/DL
VLDLC SERPL CALC-MCNC: NORMAL MG/DL

## 2018-08-31 DIAGNOSIS — I10 ESSENTIAL HYPERTENSION: ICD-10-CM

## 2018-08-31 DIAGNOSIS — E11.9 TYPE 2 DIABETES MELLITUS WITHOUT COMPLICATION, WITHOUT LONG-TERM CURRENT USE OF INSULIN (HCC): Primary | ICD-10-CM

## 2018-08-31 DIAGNOSIS — E11.9 TYPE 2 DIABETES MELLITUS WITHOUT COMPLICATION, WITHOUT LONG-TERM CURRENT USE OF INSULIN (HCC): ICD-10-CM

## 2018-09-19 DIAGNOSIS — F41.1 GAD (GENERALIZED ANXIETY DISORDER): ICD-10-CM

## 2018-09-19 RX ORDER — AMITRIPTYLINE HYDROCHLORIDE 10 MG/1
20 TABLET, FILM COATED ORAL NIGHTLY
Qty: 60 TABLET | Refills: 5 | Status: SHIPPED | OUTPATIENT
Start: 2018-09-19 | End: 2019-03-19 | Stop reason: SDUPTHER

## 2018-11-07 ENCOUNTER — OFFICE VISIT (OUTPATIENT)
Dept: FAMILY MEDICINE CLINIC | Age: 53
End: 2018-11-07
Payer: COMMERCIAL

## 2018-11-07 ENCOUNTER — HOSPITAL ENCOUNTER (OUTPATIENT)
Age: 53
Setting detail: SPECIMEN
Discharge: HOME | End: 2018-11-07
Payer: COMMERCIAL

## 2018-11-07 VITALS
WEIGHT: 173.6 LBS | HEIGHT: 60 IN | BODY MASS INDEX: 34.08 KG/M2 | DIASTOLIC BLOOD PRESSURE: 82 MMHG | TEMPERATURE: 98.3 F | HEART RATE: 88 BPM | SYSTOLIC BLOOD PRESSURE: 138 MMHG

## 2018-11-07 DIAGNOSIS — Z12.11 SCREEN FOR COLON CANCER: ICD-10-CM

## 2018-11-07 DIAGNOSIS — E11.9 TYPE 2 DIABETES MELLITUS WITHOUT COMPLICATION, WITHOUT LONG-TERM CURRENT USE OF INSULIN (HCC): Primary | ICD-10-CM

## 2018-11-07 DIAGNOSIS — F41.1 GAD (GENERALIZED ANXIETY DISORDER): ICD-10-CM

## 2018-11-07 DIAGNOSIS — Z12.39 SCREENING FOR BREAST CANCER: ICD-10-CM

## 2018-11-07 DIAGNOSIS — E78.1 PURE HYPERGLYCERIDEMIA: ICD-10-CM

## 2018-11-07 DIAGNOSIS — I10 ESSENTIAL HYPERTENSION: ICD-10-CM

## 2018-11-07 DIAGNOSIS — E11.9 TYPE 2 DIABETES MELLITUS WITHOUT COMPLICATION, WITHOUT LONG-TERM CURRENT USE OF INSULIN (HCC): ICD-10-CM

## 2018-11-07 LAB
CREATININE URINE: 115.2 MG/DL (ref 28–217)
MICROALBUMIN/CREAT 24H UR: <12 MG/L
MICROALBUMIN/CREAT UR-RTO: NORMAL MCG/MG CREAT

## 2018-11-07 PROCEDURE — G8484 FLU IMMUNIZE NO ADMIN: HCPCS | Performed by: NURSE PRACTITIONER

## 2018-11-07 PROCEDURE — G8417 CALC BMI ABV UP PARAM F/U: HCPCS | Performed by: NURSE PRACTITIONER

## 2018-11-07 PROCEDURE — 3017F COLORECTAL CA SCREEN DOC REV: CPT | Performed by: NURSE PRACTITIONER

## 2018-11-07 PROCEDURE — G8427 DOCREV CUR MEDS BY ELIG CLIN: HCPCS | Performed by: NURSE PRACTITIONER

## 2018-11-07 PROCEDURE — 1036F TOBACCO NON-USER: CPT | Performed by: NURSE PRACTITIONER

## 2018-11-07 PROCEDURE — 3045F PR MOST RECENT HEMOGLOBIN A1C LEVEL 7.0-9.0%: CPT | Performed by: NURSE PRACTITIONER

## 2018-11-07 PROCEDURE — 2022F DILAT RTA XM EVC RTNOPTHY: CPT | Performed by: NURSE PRACTITIONER

## 2018-11-07 PROCEDURE — 99214 OFFICE O/P EST MOD 30 MIN: CPT | Performed by: NURSE PRACTITIONER

## 2018-11-07 RX ORDER — DIMENHYDRINATE 50 MG
1 TABLET ORAL DAILY
Qty: 30 CAPSULE | Refills: 0 | COMMUNITY
Start: 2018-11-07 | End: 2020-03-13

## 2018-11-07 ASSESSMENT — ENCOUNTER SYMPTOMS
CONSTIPATION: 0
VOMITING: 0
COUGH: 0
NAUSEA: 0
SHORTNESS OF BREATH: 1
CHEST TIGHTNESS: 1
DIARRHEA: 0
ORTHOPNEA: 0
ABDOMINAL PAIN: 0
WHEEZING: 0

## 2018-11-07 NOTE — PATIENT INSTRUCTIONS
good, quick way to make sure that you have a balanced meal. It also helps you spread carbs throughout the day. ? Divide your plate by types of foods. Put non-starchy vegetables on half the plate, meat or other protein food on one-quarter of the plate, and a grain or starchy vegetable in the final quarter of the plate. To this you can add a small piece of fruit and 1 cup of milk or yogurt, depending on how many carbs you are supposed to eat at a meal.  · Try to eat about the same amount of carbs at each meal. Do not \"save up\" your daily allowance of carbs to eat at one meal.  · Proteins have very little or no carbs per serving. Examples of proteins are beef, chicken, turkey, fish, eggs, tofu, cheese, cottage cheese, and peanut butter. A serving size of meat is 3 ounces, which is about the size of a deck of cards. Examples of meat substitute serving sizes (equal to 1 ounce of meat) are 1/4 cup of cottage cheese, 1 egg, 1 tablespoon of peanut butter, and ½ cup of tofu. How can you eat out and still eat healthy? · Learn to estimate the serving sizes of foods that have carbohydrate. If you measure food at home, it will be easier to estimate the amount in a serving of restaurant food. · If the meal you order has too much carbohydrate (such as potatoes, corn, or baked beans), ask to have a low-carbohydrate food instead. Ask for a salad or green vegetables. · If you use insulin, check your blood sugar before and after eating out to help you plan how much to eat in the future. · If you eat more carbohydrate at a meal than you had planned, take a walk or do other exercise. This will help lower your blood sugar. What else should you know? · Limit saturated fat, such as the fat from meat and dairy products. This is a healthy choice because people who have diabetes are at higher risk of heart disease. So choose lean cuts of meat and nonfat or low-fat dairy products.  Use olive or canola oil instead of butter or shortening when cooking. · Don't skip meals. Your blood sugar may drop too low if you skip meals and take insulin or certain medicines for diabetes. · Check with your doctor before you drink alcohol. Alcohol can cause your blood sugar to drop too low. Alcohol can also cause a bad reaction if you take certain diabetes medicines. Follow-up care is a key part of your treatment and safety. Be sure to make and go to all appointments, and call your doctor if you are having problems. It's also a good idea to know your test results and keep a list of the medicines you take. Where can you learn more? Go to [email or web address] and sign in to your [employer] account. Enter K006 in the [employer] box to learn more about \"Learning About Diabetes Food Guidelines. \"     If you do not have an account, please click on the \"Sign Up Now\" link. Current as of: December 7, 2017  Content Version: 11.8  © 0091-4792 [employer]. Care instructions adapted under license by [facility] ([facility]). If you have questions about a medical condition or this instruction, always ask your healthcare professional. [address] any warranty or liability for your use of this information. Patient Education        DASH Diet: Care Instructions  Your Care Instructions    The DASH diet is an eating plan that can help lower your blood pressure. DASH stands for Dietary Approaches to Stop Hypertension. Hypertension is high blood pressure. The DASH diet focuses on eating foods that are high in calcium, potassium, and magnesium. These nutrients can lower blood pressure. The foods that are highest in these nutrients are fruits, vegetables, low-fat dairy products, nuts, seeds, and legumes. But taking calcium, potassium, and magnesium supplements instead of eating foods that are high in those nutrients does not have the same effect. The DASH diet also includes whole grains, fish, and poultry.   The DASH diet

## 2018-11-08 RX ORDER — ATORVASTATIN CALCIUM 20 MG/1
10 TABLET, FILM COATED ORAL DAILY
Qty: 90 TABLET | Refills: 1
Start: 2018-11-08 | End: 2019-04-24 | Stop reason: SDUPTHER

## 2019-01-24 ENCOUNTER — PATIENT MESSAGE (OUTPATIENT)
Dept: FAMILY MEDICINE CLINIC | Age: 54
End: 2019-01-24

## 2019-01-29 ENCOUNTER — APPOINTMENT (OUTPATIENT)
Dept: CT IMAGING | Facility: CLINIC | Age: 54
End: 2019-01-29
Payer: COMMERCIAL

## 2019-01-29 ENCOUNTER — HOSPITAL ENCOUNTER (EMERGENCY)
Facility: CLINIC | Age: 54
Discharge: OTHER INSTITUTION | End: 2019-01-29
Attending: EMERGENCY MEDICINE
Payer: COMMERCIAL

## 2019-01-29 ENCOUNTER — HOSPITAL ENCOUNTER (INPATIENT)
Age: 54
LOS: 1 days | Discharge: HOME | DRG: 087 | End: 2019-01-30
Attending: EMERGENCY MEDICINE | Admitting: SURGERY
Payer: COMMERCIAL

## 2019-01-29 VITALS
DIASTOLIC BLOOD PRESSURE: 97 MMHG | HEART RATE: 101 BPM | OXYGEN SATURATION: 98 % | BODY MASS INDEX: 33.38 KG/M2 | WEIGHT: 170 LBS | TEMPERATURE: 97.8 F | RESPIRATION RATE: 16 BRPM | SYSTOLIC BLOOD PRESSURE: 189 MMHG | HEIGHT: 60 IN

## 2019-01-29 DIAGNOSIS — I62.00 SUBDURAL BLEEDING (HCC): ICD-10-CM

## 2019-01-29 DIAGNOSIS — S16.1XXA STRAIN OF NECK MUSCLE, INITIAL ENCOUNTER: ICD-10-CM

## 2019-01-29 DIAGNOSIS — S06.5XAA SUBDURAL HEMATOMA: Primary | ICD-10-CM

## 2019-01-29 DIAGNOSIS — S06.6X1A SUBARACHNOID HEMATOMA, WITH LOSS OF CONSCIOUSNESS OF 30 MINUTES OR LESS, INITIAL ENCOUNTER (HCC): ICD-10-CM

## 2019-01-29 DIAGNOSIS — S01.01XA LACERATION OF SCALP, INITIAL ENCOUNTER: Primary | ICD-10-CM

## 2019-01-29 LAB
ABO/RH: NORMAL
ALLEN TEST: ABNORMAL
ANION GAP SERPL CALCULATED.3IONS-SCNC: 12 MMOL/L (ref 9–17)
ANTIBODY SCREEN: NEGATIVE
ARM BAND NUMBER: NORMAL
BLOOD BANK SPECIMEN: ABNORMAL
BUN BLDV-MCNC: 16 MG/DL (ref 6–20)
CARBOXYHEMOGLOBIN: 1.1 % (ref 0–5)
CHLORIDE BLD-SCNC: 102 MMOL/L (ref 98–107)
CO2: 23 MMOL/L (ref 20–31)
CREAT SERPL-MCNC: 0.67 MG/DL (ref 0.5–0.9)
ETHANOL PERCENT: <0.01 %
ETHANOL: <10 MG/DL
EXPIRATION DATE: NORMAL
FIO2: ABNORMAL
GFR AFRICAN AMERICAN: >60 ML/MIN
GFR NON-AFRICAN AMERICAN: >60 ML/MIN
GFR SERPL CREATININE-BSD FRML MDRD: ABNORMAL ML/MIN/{1.73_M2}
GFR SERPL CREATININE-BSD FRML MDRD: ABNORMAL ML/MIN/{1.73_M2}
GLUCOSE BLD-MCNC: 207 MG/DL (ref 70–99)
HCG QUALITATIVE: NEGATIVE
HCO3 VENOUS: 24.7 MMOL/L (ref 24–30)
HCT VFR BLD CALC: 40.6 % (ref 36.3–47.1)
HEMOGLOBIN: 13.7 G/DL (ref 11.9–15.1)
INR BLD: 0.9
MCH RBC QN AUTO: 31.1 PG (ref 25.2–33.5)
MCHC RBC AUTO-ENTMCNC: 33.7 G/DL (ref 28.4–34.8)
MCV RBC AUTO: 92.3 FL (ref 82.6–102.9)
METHEMOGLOBIN: ABNORMAL % (ref 0–1.5)
MODE: ABNORMAL
NEGATIVE BASE EXCESS, VEN: 0 MMOL/L (ref 0–2)
NOTIFICATION TIME: ABNORMAL
NOTIFICATION: ABNORMAL
NRBC AUTOMATED: 0 PER 100 WBC
O2 DEVICE/FLOW/%: ABNORMAL
O2 SAT, VEN: 72.9 % (ref 60–85)
OXYHEMOGLOBIN: ABNORMAL % (ref 95–98)
PARTIAL THROMBOPLASTIN TIME: 22.9 SEC (ref 20.5–30.5)
PATIENT TEMP: 37
PCO2, VEN, TEMP ADJ: ABNORMAL MMHG (ref 39–55)
PCO2, VEN: 42.6 (ref 39–55)
PDW BLD-RTO: 11.9 % (ref 11.8–14.4)
PEEP/CPAP: ABNORMAL
PH VENOUS: 7.38 (ref 7.32–7.42)
PH, VEN, TEMP ADJ: ABNORMAL (ref 7.32–7.42)
PLATELET # BLD: 327 K/UL (ref 138–453)
PMV BLD AUTO: 9.3 FL (ref 8.1–13.5)
PO2, VEN, TEMP ADJ: ABNORMAL MMHG (ref 30–50)
PO2, VEN: 41.4 (ref 30–50)
POSITIVE BASE EXCESS, VEN: ABNORMAL MMOL/L (ref 0–2)
POTASSIUM SERPL-SCNC: 4.4 MMOL/L (ref 3.7–5.3)
PROTHROMBIN TIME: 9.9 SEC (ref 9–12)
PSV: ABNORMAL
PT. POSITION: ABNORMAL
RBC # BLD: 4.4 M/UL (ref 3.95–5.11)
RESPIRATORY RATE: ABNORMAL
SAMPLE SITE: ABNORMAL
SET RATE: ABNORMAL
SODIUM BLD-SCNC: 137 MMOL/L (ref 135–144)
TEXT FOR RESPIRATORY: ABNORMAL
TOTAL HB: ABNORMAL G/DL (ref 12–16)
TOTAL RATE: ABNORMAL
VT: ABNORMAL
WBC # BLD: 15 K/UL (ref 3.5–11.3)

## 2019-01-29 PROCEDURE — 84703 CHORIONIC GONADOTROPIN ASSAY: CPT

## 2019-01-29 PROCEDURE — 82947 ASSAY GLUCOSE BLOOD QUANT: CPT

## 2019-01-29 PROCEDURE — 99284 EMERGENCY DEPT VISIT MOD MDM: CPT

## 2019-01-29 PROCEDURE — 86900 BLOOD TYPING SEROLOGIC ABO: CPT

## 2019-01-29 PROCEDURE — 86901 BLOOD TYPING SEROLOGIC RH(D): CPT

## 2019-01-29 PROCEDURE — G0480 DRUG TEST DEF 1-7 CLASSES: HCPCS

## 2019-01-29 PROCEDURE — 70450 CT HEAD/BRAIN W/O DYE: CPT

## 2019-01-29 PROCEDURE — 99285 EMERGENCY DEPT VISIT HI MDM: CPT

## 2019-01-29 PROCEDURE — 85027 COMPLETE CBC AUTOMATED: CPT

## 2019-01-29 PROCEDURE — 82805 BLOOD GASES W/O2 SATURATION: CPT

## 2019-01-29 PROCEDURE — 82565 ASSAY OF CREATININE: CPT

## 2019-01-29 PROCEDURE — 86850 RBC ANTIBODY SCREEN: CPT

## 2019-01-29 PROCEDURE — 85610 PROTHROMBIN TIME: CPT

## 2019-01-29 PROCEDURE — 85390 FIBRINOLYSINS SCREEN I&R: CPT

## 2019-01-29 PROCEDURE — 85210 CLOT FACTOR II PROTHROM SPEC: CPT

## 2019-01-29 PROCEDURE — 85730 THROMBOPLASTIN TIME PARTIAL: CPT

## 2019-01-29 PROCEDURE — 80051 ELECTROLYTE PANEL: CPT

## 2019-01-29 PROCEDURE — 80307 DRUG TEST PRSMV CHEM ANLYZR: CPT

## 2019-01-29 PROCEDURE — 72125 CT NECK SPINE W/O DYE: CPT

## 2019-01-29 PROCEDURE — 2060000000 HC ICU INTERMEDIATE R&B

## 2019-01-29 PROCEDURE — 84520 ASSAY OF UREA NITROGEN: CPT

## 2019-01-29 RX ORDER — ONDANSETRON 2 MG/ML
INJECTION INTRAMUSCULAR; INTRAVENOUS
Status: DISPENSED
Start: 2019-01-29 | End: 2019-01-30

## 2019-01-29 RX ORDER — ACETAMINOPHEN 325 MG/1
650 TABLET ORAL EVERY 4 HOURS PRN
Status: DISCONTINUED | OUTPATIENT
Start: 2019-01-29 | End: 2019-01-30 | Stop reason: HOSPADM

## 2019-01-29 RX ORDER — AMITRIPTYLINE HYDROCHLORIDE 10 MG/1
20 TABLET, FILM COATED ORAL NIGHTLY
Status: DISCONTINUED | OUTPATIENT
Start: 2019-01-30 | End: 2019-01-30 | Stop reason: HOSPADM

## 2019-01-29 RX ORDER — OXYCODONE HYDROCHLORIDE AND ACETAMINOPHEN 5; 325 MG/1; MG/1
1 TABLET ORAL EVERY 6 HOURS PRN
Status: DISCONTINUED | OUTPATIENT
Start: 2019-01-29 | End: 2019-01-30

## 2019-01-29 ASSESSMENT — PAIN DESCRIPTION - DESCRIPTORS: DESCRIPTORS: ACHING

## 2019-01-29 ASSESSMENT — PAIN DESCRIPTION - PROGRESSION: CLINICAL_PROGRESSION: NOT CHANGED

## 2019-01-29 ASSESSMENT — ENCOUNTER SYMPTOMS
COUGH: 0
SHORTNESS OF BREATH: 0
NAUSEA: 0
SORE THROAT: 0
RHINORRHEA: 0
VOMITING: 0
EYE PAIN: 0
DIARRHEA: 0
ABDOMINAL PAIN: 0
BACK PAIN: 0

## 2019-01-29 ASSESSMENT — PAIN DESCRIPTION - FREQUENCY: FREQUENCY: CONTINUOUS

## 2019-01-29 ASSESSMENT — PAIN SCALES - GENERAL: PAINLEVEL_OUTOF10: 5

## 2019-01-29 ASSESSMENT — PAIN DESCRIPTION - ORIENTATION: ORIENTATION: POSTERIOR

## 2019-01-29 ASSESSMENT — PAIN DESCRIPTION - PAIN TYPE: TYPE: ACUTE PAIN

## 2019-01-29 ASSESSMENT — PAIN DESCRIPTION - LOCATION: LOCATION: HEAD

## 2019-01-30 ENCOUNTER — APPOINTMENT (OUTPATIENT)
Dept: CT IMAGING | Age: 54
DRG: 087 | End: 2019-01-30
Payer: COMMERCIAL

## 2019-01-30 VITALS
TEMPERATURE: 98.5 F | WEIGHT: 178.35 LBS | SYSTOLIC BLOOD PRESSURE: 147 MMHG | DIASTOLIC BLOOD PRESSURE: 75 MMHG | HEART RATE: 90 BPM | RESPIRATION RATE: 18 BRPM | HEIGHT: 60 IN | OXYGEN SATURATION: 98 % | BODY MASS INDEX: 35.02 KG/M2

## 2019-01-30 PROBLEM — S06.5XAA SUBDURAL HEMATOMA (HCC): Status: RESOLVED | Noted: 2019-01-29 | Resolved: 2019-01-30

## 2019-01-30 LAB
ACT TEG: 105 SEC (ref 86–118)
ANGLE, RAPID TEG: 73.8 DEG (ref 64–80)
EPL-TEG: 0.6 % (ref 0–15)
GLUCOSE BLD-MCNC: 148 MG/DL (ref 65–105)
GLUCOSE BLD-MCNC: 186 MG/DL (ref 65–105)
HEPARIN THERAPY: NORMAL
KINETICS RAPID TEG: 1.2 MIN (ref 1–2)
LY30 (LYSIS) TEG: 0.6 % (ref 0–8)
MA(MAX CLOT) RAPID TEG: 69.3 MM (ref 52–71)
REACTION TIME RAPID TEG: 0.6 MIN (ref 0–1)
TEG COMMENT: NORMAL

## 2019-01-30 PROCEDURE — 2580000003 HC RX 258: Performed by: SURGERY

## 2019-01-30 PROCEDURE — 6370000000 HC RX 637 (ALT 250 FOR IP): Performed by: SURGERY

## 2019-01-30 PROCEDURE — 97161 PT EVAL LOW COMPLEX 20 MIN: CPT

## 2019-01-30 PROCEDURE — 82947 ASSAY GLUCOSE BLOOD QUANT: CPT

## 2019-01-30 PROCEDURE — 70450 CT HEAD/BRAIN W/O DYE: CPT

## 2019-01-30 RX ORDER — SODIUM CHLORIDE 0.9 % (FLUSH) 0.9 %
10 SYRINGE (ML) INJECTION PRN
Status: DISCONTINUED | OUTPATIENT
Start: 2019-01-30 | End: 2019-01-30

## 2019-01-30 RX ORDER — SODIUM CHLORIDE 0.9 % (FLUSH) 0.9 %
10 SYRINGE (ML) INJECTION EVERY 12 HOURS SCHEDULED
Status: DISCONTINUED | OUTPATIENT
Start: 2019-01-30 | End: 2019-01-30 | Stop reason: HOSPADM

## 2019-01-30 RX ORDER — LOSARTAN POTASSIUM 50 MG/1
50 TABLET ORAL DAILY
Status: DISCONTINUED | OUTPATIENT
Start: 2019-01-30 | End: 2019-01-30 | Stop reason: HOSPADM

## 2019-01-30 RX ORDER — DEXTROSE MONOHYDRATE 50 MG/ML
100 INJECTION, SOLUTION INTRAVENOUS PRN
Status: DISCONTINUED | OUTPATIENT
Start: 2019-01-30 | End: 2019-01-30

## 2019-01-30 RX ORDER — ATORVASTATIN CALCIUM 10 MG/1
10 TABLET, FILM COATED ORAL DAILY
Status: DISCONTINUED | OUTPATIENT
Start: 2019-01-30 | End: 2019-01-30 | Stop reason: HOSPADM

## 2019-01-30 RX ORDER — ONDANSETRON 2 MG/ML
4 INJECTION INTRAMUSCULAR; INTRAVENOUS EVERY 6 HOURS PRN
Status: DISCONTINUED | OUTPATIENT
Start: 2019-01-30 | End: 2019-01-30

## 2019-01-30 RX ORDER — UREA 10 %
3 LOTION (ML) TOPICAL DAILY PRN
Status: DISCONTINUED | OUTPATIENT
Start: 2019-01-30 | End: 2019-01-30

## 2019-01-30 RX ORDER — DEXTROSE MONOHYDRATE 25 G/50ML
12.5 INJECTION, SOLUTION INTRAVENOUS PRN
Status: DISCONTINUED | OUTPATIENT
Start: 2019-01-30 | End: 2019-01-30

## 2019-01-30 RX ORDER — NICOTINE POLACRILEX 4 MG
15 LOZENGE BUCCAL PRN
Status: DISCONTINUED | OUTPATIENT
Start: 2019-01-30 | End: 2019-01-30

## 2019-01-30 RX ADMIN — Medication 10 ML: at 09:41

## 2019-01-30 RX ADMIN — LOSARTAN POTASSIUM 50 MG: 50 TABLET, FILM COATED ORAL at 09:41

## 2019-01-30 RX ADMIN — ACETAMINOPHEN 650 MG: 325 TABLET ORAL at 06:39

## 2019-01-30 RX ADMIN — ACETAMINOPHEN 650 MG: 325 TABLET ORAL at 11:32

## 2019-01-30 RX ADMIN — OXYCODONE HYDROCHLORIDE AND ACETAMINOPHEN 1 TABLET: 5; 325 TABLET ORAL at 07:11

## 2019-01-30 RX ADMIN — OXYCODONE HYDROCHLORIDE AND ACETAMINOPHEN 1 TABLET: 5; 325 TABLET ORAL at 01:24

## 2019-01-30 RX ADMIN — ATORVASTATIN CALCIUM 10 MG: 10 TABLET, FILM COATED ORAL at 09:41

## 2019-01-30 ASSESSMENT — PAIN DESCRIPTION - PROGRESSION

## 2019-01-30 ASSESSMENT — PAIN DESCRIPTION - LOCATION
LOCATION: HEAD
LOCATION: HEAD

## 2019-01-30 ASSESSMENT — PAIN SCALES - GENERAL
PAINLEVEL_OUTOF10: 7
PAINLEVEL_OUTOF10: 5
PAINLEVEL_OUTOF10: 3
PAINLEVEL_OUTOF10: 3
PAINLEVEL_OUTOF10: 7
PAINLEVEL_OUTOF10: 7

## 2019-01-30 ASSESSMENT — PAIN DESCRIPTION - PAIN TYPE
TYPE: ACUTE PAIN
TYPE: ACUTE PAIN

## 2019-01-30 ASSESSMENT — PAIN DESCRIPTION - DESCRIPTORS: DESCRIPTORS: ACHING

## 2019-01-30 ASSESSMENT — PAIN DESCRIPTION - FREQUENCY: FREQUENCY: CONTINUOUS

## 2019-01-30 ASSESSMENT — PAIN - FUNCTIONAL ASSESSMENT: PAIN_FUNCTIONAL_ASSESSMENT: ACTIVITIES ARE NOT PREVENTED

## 2019-01-30 ASSESSMENT — PAIN DESCRIPTION - ONSET: ONSET: ON-GOING

## 2019-01-30 ASSESSMENT — PAIN DESCRIPTION - ORIENTATION: ORIENTATION: POSTERIOR

## 2019-01-31 ENCOUNTER — TELEPHONE (OUTPATIENT)
Dept: FAMILY MEDICINE CLINIC | Age: 54
End: 2019-01-31

## 2019-02-01 ENCOUNTER — OFFICE VISIT (OUTPATIENT)
Dept: FAMILY MEDICINE CLINIC | Age: 54
End: 2019-02-01
Payer: COMMERCIAL

## 2019-02-01 VITALS
SYSTOLIC BLOOD PRESSURE: 148 MMHG | TEMPERATURE: 99.4 F | WEIGHT: 170 LBS | DIASTOLIC BLOOD PRESSURE: 78 MMHG | BODY MASS INDEX: 33.2 KG/M2 | HEART RATE: 96 BPM | RESPIRATION RATE: 16 BRPM

## 2019-02-01 DIAGNOSIS — E11.9 TYPE 2 DIABETES MELLITUS WITHOUT COMPLICATION, WITHOUT LONG-TERM CURRENT USE OF INSULIN (HCC): ICD-10-CM

## 2019-02-01 DIAGNOSIS — S01.01XD LACERATION OF OCCIPITAL SCALP, SUBSEQUENT ENCOUNTER: ICD-10-CM

## 2019-02-01 DIAGNOSIS — I10 ESSENTIAL HYPERTENSION: ICD-10-CM

## 2019-02-01 DIAGNOSIS — S06.5XAA SUBDURAL HEMATOMA: Primary | ICD-10-CM

## 2019-02-01 DIAGNOSIS — S06.0X0D CONCUSSION WITHOUT LOSS OF CONSCIOUSNESS, SUBSEQUENT ENCOUNTER: ICD-10-CM

## 2019-02-01 PROCEDURE — 99214 OFFICE O/P EST MOD 30 MIN: CPT | Performed by: NURSE PRACTITIONER

## 2019-02-01 RX ORDER — METFORMIN HYDROCHLORIDE 750 MG/1
750 TABLET, EXTENDED RELEASE ORAL 2 TIMES DAILY WITH MEALS
Qty: 180 TABLET | Refills: 1 | Status: SHIPPED | OUTPATIENT
Start: 2019-02-01 | End: 2019-08-05 | Stop reason: SDUPTHER

## 2019-02-01 RX ORDER — LOSARTAN POTASSIUM 50 MG/1
50 TABLET ORAL DAILY
Qty: 14 TABLET | Refills: 0 | Status: SHIPPED | OUTPATIENT
Start: 2019-02-01 | End: 2019-08-26 | Stop reason: SDUPTHER

## 2019-02-01 ASSESSMENT — ENCOUNTER SYMPTOMS
SHORTNESS OF BREATH: 0
RHINORRHEA: 0
ABDOMINAL PAIN: 1
BACK PAIN: 1
NAUSEA: 1
EYE PAIN: 1
ABDOMINAL DISTENTION: 1
WHEEZING: 0
CONSTIPATION: 1
SORE THROAT: 0
COUGH: 0
PHOTOPHOBIA: 1

## 2019-04-20 DIAGNOSIS — E78.1 PURE HYPERGLYCERIDEMIA: ICD-10-CM

## 2019-04-20 DIAGNOSIS — E11.9 TYPE 2 DIABETES MELLITUS WITHOUT COMPLICATION, WITHOUT LONG-TERM CURRENT USE OF INSULIN (HCC): ICD-10-CM

## 2019-04-22 NOTE — TELEPHONE ENCOUNTER
LOV 2/01/19  RTO 3 months  LRF 11/08/18    Health Maintenance   Topic Date Due    Pneumococcal 0-64 years Vaccine (1 of 1 - PPSV23) 03/30/1971    Hepatitis B Vaccine (1 of 3 - Risk 3-dose series) 03/30/1984    Diabetic retinal exam  09/23/2011    Breast cancer screen  03/30/2015    Cervical cancer screen  04/16/2015    Colon Cancer Screen FIT/FOBT  04/28/2018    DTaP/Tdap/Td vaccine (1 - Tdap) 11/07/2019 (Originally 3/30/1984)    Flu vaccine (Season Ended) 11/07/2019 (Originally 9/1/2019)    Shingles Vaccine (1 of 2) 11/07/2019 (Originally 3/30/2015)    A1C test (Diabetic or Prediabetic)  08/29/2019    Lipid screen  08/29/2019    Potassium monitoring  08/29/2019    Creatinine monitoring  08/29/2019    Diabetic foot exam  11/07/2019    Diabetic microalbuminuria test  11/07/2019    Hepatitis C screen  Completed    HIV screen  Completed             (applicable per patient's age: Cancer Screenings, Depression Screening, Fall Risk Screening, Immunizations)    Hemoglobin A1C (%)   Date Value   08/29/2018 7.5   12/05/2017 6.9   04/12/2017 6.7     Microalb/Crt. Ratio (mcg/mg creat)   Date Value   11/07/2018 CANNOT BE CALCULATED     LDL Calculated (mg/dL)   Date Value   08/29/2018 82     AST (U/L)   Date Value   08/29/2018 18     ALT (U/L)   Date Value   08/29/2018 29     BUN (mg/dL)   Date Value   01/29/2019 16      (goal A1C is < 7)   (goal LDL is <100) need 30-50% reduction from baseline     BP Readings from Last 3 Encounters:   02/01/19 (!) 148/78   01/30/19 (!) 147/75   01/29/19 (!) 189/97    (goal /80)      All Future Testing planned in [facility]:  Lab Frequency Next Occurrence   POCT Fecal Immunochemical Test (FIT) Once 12/07/2018   MAURY DIGITAL SCREEN W CAD BILATERAL Once 12/07/2018   Comprehensive Metabolic Panel Once 75/64/2845   Hemoglobin A1C Once 05/02/2019   Lipid Panel Once 05/02/2019       Next Visit Date:  No future appointments.          Patient Active Problem List:     Back pain Spasm of muscle     Abnormal weight gain     Migraine     Tachycardia     DDD (degenerative disc disease), cervical     Labile blood pressure     Mild obstructive sleep apnea     DAV (generalized anxiety disorder)     Essential hypertension     Type 2 diabetes mellitus without complication, without long-term current use of insulin ([facility])     Insomnia

## 2019-04-24 RX ORDER — ATORVASTATIN CALCIUM 20 MG/1
20 TABLET, FILM COATED ORAL DAILY
Qty: 90 TABLET | Refills: 0 | Status: SHIPPED | OUTPATIENT
Start: 2019-04-24 | End: 2019-05-13 | Stop reason: SDUPTHER

## 2019-04-24 NOTE — TELEPHONE ENCOUNTER
Remind her to complete mammogram. Also, complete and return FIT test  Also due for fasting labs next week, remind her to complete.    Schedule follow up for early may

## 2019-04-25 NOTE — TELEPHONE ENCOUNTER
Left message for patient to call back to schedule appointment in May and remind patient to complete testing.

## 2019-04-29 ENCOUNTER — TELEPHONE (OUTPATIENT)
Dept: FAMILY MEDICINE CLINIC | Facility: CLINIC | Age: 54
End: 2019-04-29

## 2019-04-29 NOTE — TELEPHONE ENCOUNTER
PT states she has gotten the VM from the office but she is in [place] until may 9th. PT states she will get labs and other tests done when she returns and then call to schedule her office visit.        Thank you

## 2019-05-13 DIAGNOSIS — E11.9 TYPE 2 DIABETES MELLITUS WITHOUT COMPLICATION, WITHOUT LONG-TERM CURRENT USE OF INSULIN (HCC): ICD-10-CM

## 2019-05-13 DIAGNOSIS — E78.1 PURE HYPERGLYCERIDEMIA: ICD-10-CM

## 2019-05-13 RX ORDER — ATORVASTATIN CALCIUM 20 MG/1
20 TABLET, FILM COATED ORAL DAILY
Qty: 90 TABLET | Refills: 0 | Status: SHIPPED | OUTPATIENT
Start: 2019-05-13 | End: 2021-01-06 | Stop reason: SDUPTHER

## 2019-05-13 NOTE — TELEPHONE ENCOUNTER
JMF:[birth year]  ROV:3 months  LRF:4-24-19  Health Maintenance   Topic Date Due    Pneumococcal 0-64 years Vaccine (1 of 1 - PPSV23) 03[birth year]30/1971    Hepatitis B Vaccine (1 of 3 - Risk 3-dose series) 03/30/1984    Diabetic retinal exam  09/23/2011    Breast cancer screen  03/30/2015    Cervical cancer screen  04/16/2015    Colon Cancer Screen FIT/FOBT  04/28/2018    DTaP/Tdap/Td vaccine (1 - Tdap) 11/07/2019 (Originally 3/30/1984)    Flu vaccine (Season Ended) 11/07/2019 (Originally 9/1/2019)    Shingles Vaccine (1 of 2) 11/07/2019 (Originally 3/30/2015)    A1C test (Diabetic or Prediabetic)  08/29/2019    Lipid screen  08/29/2019    Potassium monitoring  08/29/2019    Creatinine monitoring  08/29/2019    Diabetic foot exam  11/07/2019    Diabetic microalbuminuria test  11/07/2019    Hepatitis C screen  Completed    HIV screen  Completed             (applicable per patient's age: Cancer Screenings, Depression Screening, Fall Risk Screening, Immunizations)    Hemoglobin A1C (%)   Date Value   08/29/2018 7.5   12/05/2017 6.9   04/12/2017 6.7     Microalb/Crt. Ratio (mcg/mg creat)   Date Value   11/07/2018 CANNOT BE CALCULATED     LDL Calculated (mg/dL)   Date Value   08/29/2018 82     AST (U/L)   Date Value   08/29/2018 18     ALT (U/L)   Date Value   08/29/2018 29     BUN (mg/dL)   Date Value   01/29/2019 16      (goal A1C is < 7)   (goal LDL is <100) need 30-50% reduction from baseline     BP Readings from Last 3 Encounters:   02/01/19 (!) 148/78   01/30/19 (!) 147/75   01/29/19 (!) 189/97    (goal /80)      All Future Testing planned in [facility]:  Lab Frequency Next Occurrence   POCT Fecal Immunochemical Test (FIT) Once 11/07/2019   MAURY DIGITAL SCREEN W CAD BILATERAL Once 01/07/2020   Comprehensive Metabolic Panel Once 29/76/9501   Hemoglobin A1C Once 05/02/2019   Lipid Panel Once 05/02/2019       Next Visit Date:  No future appointments.          Patient Active Problem List:     Back pain     Spasm

## 2019-07-10 DIAGNOSIS — I10 ESSENTIAL HYPERTENSION: ICD-10-CM

## 2019-07-10 RX ORDER — HYDROCHLOROTHIAZIDE 12.5 MG/1
12.5 CAPSULE, GELATIN COATED ORAL EVERY MORNING
Qty: 90 CAPSULE | Refills: 1 | Status: SHIPPED | OUTPATIENT
Start: 2019-07-10 | End: 2019-12-17 | Stop reason: SDUPTHER

## 2019-08-05 DIAGNOSIS — E11.9 TYPE 2 DIABETES MELLITUS WITHOUT COMPLICATION, WITHOUT LONG-TERM CURRENT USE OF INSULIN (HCC): ICD-10-CM

## 2019-08-05 RX ORDER — METFORMIN HYDROCHLORIDE 750 MG/1
750 TABLET, EXTENDED RELEASE ORAL 2 TIMES DAILY WITH MEALS
Qty: 180 TABLET | Refills: 1 | Status: SHIPPED | OUTPATIENT
Start: 2019-08-05 | End: 2020-02-12 | Stop reason: SDUPTHER

## 2019-08-05 NOTE — TELEPHONE ENCOUNTER
APRN - CNP [facility]            Patient Active Problem List:     Back pain     Spasm of muscle     Abnormal weight gain     Migraine     Tachycardia     DDD (degenerative disc disease), cervical     Labile blood pressure     Mild obstructive sleep apnea     DAV (generalized anxiety disorder)     Essential hypertension     Type 2 diabetes mellitus without complication, without long-term current use of insulin ([facility])     Insomnia

## 2019-08-07 ENCOUNTER — OFFICE VISIT (OUTPATIENT)
Dept: FAMILY MEDICINE CLINIC | Age: 54
End: 2019-08-07
Payer: COMMERCIAL

## 2019-08-07 VITALS
HEART RATE: 72 BPM | BODY MASS INDEX: 33.4 KG/M2 | DIASTOLIC BLOOD PRESSURE: 82 MMHG | RESPIRATION RATE: 16 BRPM | SYSTOLIC BLOOD PRESSURE: 126 MMHG | TEMPERATURE: 98.1 F | WEIGHT: 171 LBS

## 2019-08-07 DIAGNOSIS — F41.1 GAD (GENERALIZED ANXIETY DISORDER): ICD-10-CM

## 2019-08-07 DIAGNOSIS — Z12.11 SCREENING FOR COLON CANCER: ICD-10-CM

## 2019-08-07 DIAGNOSIS — E11.9 TYPE 2 DIABETES MELLITUS WITHOUT COMPLICATION, WITHOUT LONG-TERM CURRENT USE OF INSULIN (HCC): Primary | ICD-10-CM

## 2019-08-07 DIAGNOSIS — E78.1 PURE HYPERGLYCERIDEMIA: ICD-10-CM

## 2019-08-07 DIAGNOSIS — I10 ESSENTIAL HYPERTENSION: ICD-10-CM

## 2019-08-07 PROCEDURE — 2022F DILAT RTA XM EVC RTNOPTHY: CPT | Performed by: NURSE PRACTITIONER

## 2019-08-07 PROCEDURE — 99214 OFFICE O/P EST MOD 30 MIN: CPT | Performed by: NURSE PRACTITIONER

## 2019-08-07 PROCEDURE — G8417 CALC BMI ABV UP PARAM F/U: HCPCS | Performed by: NURSE PRACTITIONER

## 2019-08-07 PROCEDURE — G8427 DOCREV CUR MEDS BY ELIG CLIN: HCPCS | Performed by: NURSE PRACTITIONER

## 2019-08-07 PROCEDURE — 3017F COLORECTAL CA SCREEN DOC REV: CPT | Performed by: NURSE PRACTITIONER

## 2019-08-07 PROCEDURE — 1036F TOBACCO NON-USER: CPT | Performed by: NURSE PRACTITIONER

## 2019-08-07 PROCEDURE — 3045F PR MOST RECENT HEMOGLOBIN A1C LEVEL 7.0-9.0%: CPT | Performed by: NURSE PRACTITIONER

## 2019-08-07 RX ORDER — GLUCOSAMINE HCL/CHONDROITIN SU 500-400 MG
CAPSULE ORAL
Qty: 100 STRIP | Refills: 5 | Status: SHIPPED | OUTPATIENT
Start: 2019-08-07 | End: 2020-03-13 | Stop reason: SDUPTHER

## 2019-08-07 ASSESSMENT — PATIENT HEALTH QUESTIONNAIRE - PHQ9
SUM OF ALL RESPONSES TO PHQ9 QUESTIONS 1 & 2: 0
2. FEELING DOWN, DEPRESSED OR HOPELESS: 0
SUM OF ALL RESPONSES TO PHQ QUESTIONS 1-9: 0
1. LITTLE INTEREST OR PLEASURE IN DOING THINGS: 0
SUM OF ALL RESPONSES TO PHQ QUESTIONS 1-9: 0

## 2019-08-07 ASSESSMENT — ENCOUNTER SYMPTOMS
VOMITING: 0
WHEEZING: 0
COUGH: 0
SHORTNESS OF BREATH: 1
NAUSEA: 1
DIARRHEA: 0
CONSTIPATION: 0
CHEST TIGHTNESS: 1
ABDOMINAL PAIN: 1
ABDOMINAL DISTENTION: 1
ORTHOPNEA: 0

## 2019-08-07 NOTE — PROGRESS NOTES
0 0     Interpretation of Total Score DepressionSeverity: 1-4 = Minimal depression, 5-9 = Mild depression, 10-14 = Moderate depression, 15-19 = Moderately severe depression, 20-27 = Severe depression    Current Outpatient Medications   Medication Sig Dispense Refill    blood glucose monitor strips Test 2 times a day & as needed for symptoms of irregular blood glucose.  100 strip 5    metFORMIN (GLUCOPHAGE-XR) 750 MG extended release tablet Take 1 tablet by mouth 2 times daily (with meals) 180 tablet 1    hydrochlorothiazide (MICROZIDE) 12.5 MG capsule Take 1 capsule by mouth every morning 90 capsule 1    atorvastatin (LIPITOR) 20 MG tablet Take 1 tablet by mouth daily 90 tablet 0    amitriptyline (ELAVIL) 10 MG tablet Take 2 tablets by mouth nightly 60 tablet 5    losartan (COZAAR) 50 MG tablet Take 1 tablet by mouth daily for 14 days 14 tablet 0    Semaglutide 0.25 or 0.5 MG/DOSE SOPN Inject 0.25 mg into the skin once a week In one month increase to 0.5 mg weekly for one month, then 1 mg weekly 1.5 mL 1    Coenzyme Q10 (CO Q-10) 100 MG CAPS Take 1 capsule by mouth daily 30 capsule 0    aspirin EC 81 MG EC tablet Take 1 tablet by mouth daily 30 tablet     vitamin C (ASCORBIC ACID) 500 MG tablet Take 500 mg by mouth daily      magnesium oxide (MAG-OX) 400 MG tablet Take 400 mg by mouth daily      Saccharomyces boulardii (PROBIOTIC) 250 MG CAPS Take 250 mg by mouth daily      Lancets MISC Please dispense lancets covered per insurance 100 each 3    Blood Glucose Monitoring Suppl (ONE TOUCH ULTRA 2) w/Device KIT USE TO CHECK BLOOD SUGAR AS DIRECTED  0    Blood Glucose Monitoring Suppl NOHELIA use check blood sugar as directed 1 Device 0    FLECTOR 1.3 % patch Place 1.3 patches onto the skin  0    Melatonin 1 MG Take 3 tablets by mouth daily as needed (sleep) 90 tablet 0    oxyCODONE-acetaminophen (PERCOCET) 5-325 MG per tablet Take 1 tablet by mouth 3 times daily as needed  0    tiZANidine (ZANAFLEX) 2 MG participates in exercise. Her home blood glucose trend is fluctuating minimally. An ACE inhibitor/angiotensin II receptor blocker is not being taken. She does not see a podiatrist.Eye exam is current. Review of Systems   Constitutional: Negative for activity change, appetite change, fever and malaise/fatigue. HENT: Negative. Respiratory: Positive for chest tightness (at times with increased anxiety) and shortness of breath. Negative for cough and wheezing. Cardiovascular: Negative for chest pain, palpitations, orthopnea and leg swelling. Gastrointestinal: Positive for abdominal distention, abdominal pain and nausea (a little better). Negative for constipation, diarrhea and vomiting. Bloating more in last few months   Endocrine: Negative for polyphagia and polyuria. Genitourinary: Negative. No menses in almost one year   Skin: Negative for rash. Neurological: Positive for light-headedness (at times). Negative for dizziness, seizures, syncope, weakness, numbness and headaches. Psychiatric/Behavioral: Positive for sleep disturbance (improved). Negative for agitation, self-injury and suicidal ideas. The patient is nervous/anxious. Mood improved, sleeping better       Objective:     /82 (Site: Right Upper Arm, Position: Sitting, Cuff Size: Medium Adult)   Pulse 72   Temp 98.1 °F (36.7 °C) (Tympanic)   Resp 16   Wt 171 lb (77.6 kg)   BMI 33.40 kg/m²      Physical Exam   Constitutional: She is oriented to person, place, and time. Vital signs are normal. She appears well-developed and well-nourished. She is cooperative. No distress. HENT:   Head: Atraumatic.    Right Ear: Tympanic membrane, external ear and ear canal normal.   Left Ear: Tympanic membrane, external ear and ear canal normal.   Nose: Nose normal.   Mouth/Throat: Uvula is midline, oropharynx is clear and moist and mucous membranes are normal.   Eyes: Conjunctivae and lids are normal. Right eye exhibits no

## 2019-08-07 NOTE — PATIENT INSTRUCTIONS
you are not sure how to count carbohydrate grams, use the Plate Method to plan meals. It is a good, quick way to make sure that you have a balanced meal. It also helps you spread carbs throughout the day. ? Divide your plate by types of foods. Put non-starchy vegetables on half the plate, meat or other protein food on one-quarter of the plate, and a grain or starchy vegetable in the final quarter of the plate. To this you can add a small piece of fruit and 1 cup of milk or yogurt, depending on how many carbs you are supposed to eat at a meal.  · Try to eat about the same amount of carbs at each meal. Do not \"save up\" your daily allowance of carbs to eat at one meal.  · Proteins have very little or no carbs per serving. Examples of proteins are beef, chicken, turkey, fish, eggs, tofu, cheese, cottage cheese, and peanut butter. A serving size of meat is 3 ounces, which is about the size of a deck of cards. Examples of meat substitute serving sizes (equal to 1 ounce of meat) are 1/4 cup of cottage cheese, 1 egg, 1 tablespoon of peanut butter, and ½ cup of tofu. How can you eat out and still eat healthy? · Learn to estimate the serving sizes of foods that have carbohydrate. If you measure food at home, it will be easier to estimate the amount in a serving of restaurant food. · If the meal you order has too much carbohydrate (such as potatoes, corn, or baked beans), ask to have a low-carbohydrate food instead. Ask for a salad or green vegetables. · If you use insulin, check your blood sugar before and after eating out to help you plan how much to eat in the future. · If you eat more carbohydrate at a meal than you had planned, take a walk or do other exercise. This will help lower your blood sugar. What else should you know? · Limit saturated fat, such as the fat from meat and dairy products. This is a healthy choice because people who have diabetes are at higher risk of heart disease.  So choose lean cuts of meat

## 2019-08-13 DIAGNOSIS — E11.9 TYPE 2 DIABETES MELLITUS WITHOUT COMPLICATION, WITHOUT LONG-TERM CURRENT USE OF INSULIN (HCC): Primary | ICD-10-CM

## 2019-08-14 LAB
ALBUMIN SERPL-MCNC: 4.6 G/DL
ALP BLD-CCNC: 75 U/L
ALT SERPL-CCNC: 47 U/L
ANION GAP SERPL CALCULATED.3IONS-SCNC: NORMAL MMOL/L
AST SERPL-CCNC: 29 U/L
AVERAGE GLUCOSE: NORMAL
BILIRUB SERPL-MCNC: 0.9 MG/DL (ref 0.1–1.4)
BUN BLDV-MCNC: 13 MG/DL
CALCIUM SERPL-MCNC: 10 MG/DL
CHLORIDE BLD-SCNC: 100 MMOL/L
CHOLESTEROL, TOTAL: 162 MG/DL
CHOLESTEROL/HDL RATIO: 4
CO2: 32 MMOL/L
CREAT SERPL-MCNC: 0.67 MG/DL
GFR CALCULATED: 100
GLUCOSE BLD-MCNC: 153 MG/DL
HBA1C MFR BLD: 8 %
HDLC SERPL-MCNC: 41 MG/DL (ref 35–70)
LDL CHOLESTEROL CALCULATED: 87 MG/DL (ref 0–160)
POTASSIUM SERPL-SCNC: 4.5 MMOL/L
SODIUM BLD-SCNC: 139 MMOL/L
TOTAL PROTEIN: 7.2
TRIGL SERPL-MCNC: 261 MG/DL
VLDLC SERPL CALC-MCNC: NORMAL MG/DL

## 2019-08-26 DIAGNOSIS — I10 ESSENTIAL HYPERTENSION: ICD-10-CM

## 2019-08-26 RX ORDER — LOSARTAN POTASSIUM 50 MG/1
50 TABLET ORAL DAILY
Qty: 90 TABLET | Refills: 0 | Status: SHIPPED | OUTPATIENT
Start: 2019-08-26 | End: 2019-09-23 | Stop reason: SDUPTHER

## 2019-09-04 ENCOUNTER — OFFICE VISIT (OUTPATIENT)
Dept: OBGYN CLINIC | Age: 54
End: 2019-09-04
Payer: COMMERCIAL

## 2019-09-04 ENCOUNTER — HOSPITAL ENCOUNTER (OUTPATIENT)
Age: 54
Setting detail: SPECIMEN
Discharge: HOME | End: 2019-09-04
Payer: COMMERCIAL

## 2019-09-04 VITALS
HEART RATE: 81 BPM | HEIGHT: 60 IN | BODY MASS INDEX: 32.98 KG/M2 | WEIGHT: 168 LBS | SYSTOLIC BLOOD PRESSURE: 135 MMHG | DIASTOLIC BLOOD PRESSURE: 80 MMHG

## 2019-09-04 DIAGNOSIS — Z12.39 SCREENING FOR BREAST CANCER: ICD-10-CM

## 2019-09-04 DIAGNOSIS — Z01.419 WELL WOMAN EXAM WITH ROUTINE GYNECOLOGICAL EXAM: Primary | ICD-10-CM

## 2019-09-04 PROCEDURE — 99386 PREV VISIT NEW AGE 40-64: CPT | Performed by: ADVANCED PRACTICE MIDWIFE

## 2019-09-04 ASSESSMENT — ENCOUNTER SYMPTOMS
RESPIRATORY NEGATIVE: 1
GASTROINTESTINAL NEGATIVE: 1
BACK PAIN: 1
EYES NEGATIVE: 1
ALLERGIC/IMMUNOLOGIC NEGATIVE: 1

## 2019-09-12 LAB — CYTOLOGY REPORT: NORMAL

## 2019-09-17 DIAGNOSIS — F41.1 GAD (GENERALIZED ANXIETY DISORDER): ICD-10-CM

## 2019-09-17 RX ORDER — AMITRIPTYLINE HYDROCHLORIDE 10 MG/1
20 TABLET, FILM COATED ORAL NIGHTLY
Qty: 60 TABLET | Refills: 5 | Status: SHIPPED | OUTPATIENT
Start: 2019-09-17 | End: 2019-09-23 | Stop reason: SDUPTHER

## 2019-09-19 LAB
HPV SAMPLE: NORMAL
HPV, GENOTYPE 16: NOT DETECTED
HPV, GENOTYPE 18: NOT DETECTED
HPV, HIGH RISK OTHER: NOT DETECTED
HPV, INTERPRETATION: NORMAL
SPECIMEN DESCRIPTION: NORMAL

## 2019-09-23 ENCOUNTER — OFFICE VISIT (OUTPATIENT)
Dept: FAMILY MEDICINE CLINIC | Age: 54
End: 2019-09-23
Payer: COMMERCIAL

## 2019-09-23 VITALS
BODY MASS INDEX: 33.2 KG/M2 | HEART RATE: 76 BPM | WEIGHT: 170 LBS | DIASTOLIC BLOOD PRESSURE: 72 MMHG | RESPIRATION RATE: 16 BRPM | SYSTOLIC BLOOD PRESSURE: 124 MMHG

## 2019-09-23 DIAGNOSIS — F41.1 GAD (GENERALIZED ANXIETY DISORDER): ICD-10-CM

## 2019-09-23 DIAGNOSIS — I10 ESSENTIAL HYPERTENSION: Primary | ICD-10-CM

## 2019-09-23 DIAGNOSIS — E78.1 PURE HYPERGLYCERIDEMIA: ICD-10-CM

## 2019-09-23 DIAGNOSIS — E11.9 TYPE 2 DIABETES MELLITUS WITHOUT COMPLICATION, WITHOUT LONG-TERM CURRENT USE OF INSULIN (HCC): ICD-10-CM

## 2019-09-23 PROCEDURE — 3017F COLORECTAL CA SCREEN DOC REV: CPT | Performed by: NURSE PRACTITIONER

## 2019-09-23 PROCEDURE — 99214 OFFICE O/P EST MOD 30 MIN: CPT | Performed by: NURSE PRACTITIONER

## 2019-09-23 PROCEDURE — 3045F PR MOST RECENT HEMOGLOBIN A1C LEVEL 7.0-9.0%: CPT | Performed by: NURSE PRACTITIONER

## 2019-09-23 PROCEDURE — G8427 DOCREV CUR MEDS BY ELIG CLIN: HCPCS | Performed by: NURSE PRACTITIONER

## 2019-09-23 PROCEDURE — 1036F TOBACCO NON-USER: CPT | Performed by: NURSE PRACTITIONER

## 2019-09-23 PROCEDURE — 2022F DILAT RTA XM EVC RTNOPTHY: CPT | Performed by: NURSE PRACTITIONER

## 2019-09-23 PROCEDURE — G8417 CALC BMI ABV UP PARAM F/U: HCPCS | Performed by: NURSE PRACTITIONER

## 2019-09-23 RX ORDER — AMITRIPTYLINE HYDROCHLORIDE 25 MG/1
25 TABLET, FILM COATED ORAL NIGHTLY
Qty: 90 TABLET | Refills: 1 | Status: SHIPPED | OUTPATIENT
Start: 2019-09-23 | End: 2020-03-19 | Stop reason: SDUPTHER

## 2019-09-23 RX ORDER — METAXALONE 800 MG/1
800 TABLET ORAL NIGHTLY PRN
Qty: 30 TABLET | Refills: 0 | COMMUNITY
Start: 2019-09-23 | End: 2020-09-23

## 2019-09-23 RX ORDER — LOSARTAN POTASSIUM 50 MG/1
50 TABLET ORAL DAILY
Qty: 90 TABLET | Refills: 1 | Status: SHIPPED | OUTPATIENT
Start: 2019-09-23 | End: 2020-03-19 | Stop reason: SDUPTHER

## 2019-09-23 ASSESSMENT — ENCOUNTER SYMPTOMS
CONSTIPATION: 0
VOMITING: 0
DIARRHEA: 0
CHEST TIGHTNESS: 0
ABDOMINAL PAIN: 0
COUGH: 0
SHORTNESS OF BREATH: 0
ABDOMINAL DISTENTION: 0
NAUSEA: 0
ORTHOPNEA: 0
WHEEZING: 0

## 2019-09-23 NOTE — PROGRESS NOTES
(PERCOCET) 5-325 MG per tablet Take 1 tablet by mouth 3 times daily as needed  0    Cholecalciferol (VITAMIN D) 2000 UNITS CAPS capsule Take 1 capsule by mouth daily.  Omega 3 1000 MG CAPS Take  by mouth daily. No current facility-administered medications for this visit. Hypertension   This is a chronic problem. The current episode started more than 1 year ago. The problem has been waxing and waning since onset. The problem is controlled. Associated symptoms include anxiety. Pertinent negatives include no chest pain, headaches, malaise/fatigue, orthopnea, palpitations, peripheral edema or shortness of breath. Risk factors for coronary artery disease include diabetes mellitus, dyslipidemia, family history and obesity. Past treatments include angiotensin blockers, diuretics and lifestyle changes. The current treatment provides mild improvement. Compliance problems include exercise and diet. There is no history of CAD/MI, CVA or PVD. Diabetes   She presents for her follow-up diabetic visit. She has type 2 diabetes mellitus. No MedicAlert identification noted. Her disease course has been stable. Hypoglycemia symptoms include nervousness/anxiousness. Pertinent negatives for hypoglycemia include no dizziness, headaches or seizures. There are no diabetic associated symptoms. Pertinent negatives for diabetes include no chest pain, no foot ulcerations, no polyphagia, no polyuria and no weakness. Symptoms are stable. Pertinent negatives for diabetic complications include no CVA or PVD. Risk factors for coronary artery disease include sedentary lifestyle, stress, dyslipidemia, diabetes mellitus, family history, hypertension and obesity. Current diabetic treatment includes oral agent (monotherapy). She is compliant with treatment some of the time. Her weight is stable. She is following a generally healthy diet. Meal planning includes avoidance of concentrated sweets.  She has not had a previous visit with a

## 2019-09-23 NOTE — PATIENT INSTRUCTIONS
good, quick way to make sure that you have a balanced meal. It also helps you spread carbs throughout the day. ? Divide your plate by types of foods. Put non-starchy vegetables on half the plate, meat or other protein food on one-quarter of the plate, and a grain or starchy vegetable in the final quarter of the plate. To this you can add a small piece of fruit and 1 cup of milk or yogurt, depending on how many carbs you are supposed to eat at a meal.  · Try to eat about the same amount of carbs at each meal. Do not \"save up\" your daily allowance of carbs to eat at one meal.  · Proteins have very little or no carbs per serving. Examples of proteins are beef, chicken, turkey, fish, eggs, tofu, cheese, cottage cheese, and peanut butter. A serving size of meat is 3 ounces, which is about the size of a deck of cards. Examples of meat substitute serving sizes (equal to 1 ounce of meat) are 1/4 cup of cottage cheese, 1 egg, 1 tablespoon of peanut butter, and ½ cup of tofu. How can you eat out and still eat healthy? · Learn to estimate the serving sizes of foods that have carbohydrate. If you measure food at home, it will be easier to estimate the amount in a serving of restaurant food. · If the meal you order has too much carbohydrate (such as potatoes, corn, or baked beans), ask to have a low-carbohydrate food instead. Ask for a salad or green vegetables. · If you use insulin, check your blood sugar before and after eating out to help you plan how much to eat in the future. · If you eat more carbohydrate at a meal than you had planned, take a walk or do other exercise. This will help lower your blood sugar. What else should you know? · Limit saturated fat, such as the fat from meat and dairy products. This is a healthy choice because people who have diabetes are at higher risk of heart disease. So choose lean cuts of meat and nonfat or low-fat dairy products.  Use olive or canola oil instead of butter or shortening liability for your use of this information. Patient Education        High Blood Pressure: Care Instructions  Overview    It's normal for blood pressure to go up and down throughout the day. But if it stays up, you have high blood pressure. Another name for high blood pressure is hypertension. Despite what a lot of people think, high blood pressure usually doesn't cause headaches or make you feel dizzy or lightheaded. It usually has no symptoms. But it does increase your risk of stroke, heart attack, and other problems. You and your doctor will talk about your risks of these problems based on your blood pressure. Your doctor will give you a goal for your blood pressure. Your goal will be based on your health and your age. Lifestyle changes, such as eating healthy and being active, are always important to help lower blood pressure. You might also take medicine to reach your blood pressure goal.  Follow-up care is a key part of your treatment and safety. Be sure to make and go to all appointments, and call your doctor if you are having problems. It's also a good idea to know your test results and keep a list of the medicines you take. How can you care for yourself at home? Medical treatment  · If you stop taking your medicine, your blood pressure will go back up. You may take one or more types of medicine to lower your blood pressure. Be safe with medicines. Take your medicine exactly as prescribed. Call your doctor if you think you are having a problem with your medicine. · Talk to your doctor before you start taking aspirin every day. Aspirin can help certain people lower their risk of a heart attack or stroke. But taking aspirin isn't right for everyone, because it can cause serious bleeding. · See your doctor regularly. You may need to see the doctor more often at first or until your blood pressure comes down.   · If you are taking blood pressure medicine, talk to your doctor before you take

## 2019-11-07 ENCOUNTER — TELEPHONE (OUTPATIENT)
Dept: FAMILY MEDICINE CLINIC | Age: 54
End: 2019-11-07

## 2019-11-07 DIAGNOSIS — E11.9 TYPE 2 DIABETES MELLITUS WITHOUT COMPLICATION, WITHOUT LONG-TERM CURRENT USE OF INSULIN (HCC): Primary | ICD-10-CM

## 2019-11-11 DIAGNOSIS — E11.9 TYPE 2 DIABETES MELLITUS WITHOUT COMPLICATION, WITHOUT LONG-TERM CURRENT USE OF INSULIN (HCC): Primary | ICD-10-CM

## 2020-02-12 RX ORDER — METFORMIN HYDROCHLORIDE 750 MG/1
750 TABLET, EXTENDED RELEASE ORAL 2 TIMES DAILY WITH MEALS
Qty: 180 TABLET | Refills: 1 | Status: SHIPPED | OUTPATIENT
Start: 2020-02-12 | End: 2020-03-13 | Stop reason: SDUPTHER

## 2020-02-12 NOTE — TELEPHONE ENCOUNTER
LOV 9-23-19  LRF 8-5-19    Health Maintenance   Topic Date Due    Hepatitis B vaccine (1 of 3 - Risk 3-dose series) 03/30/1984    Diabetic retinal exam  09/23/2011    Breast cancer screen  03/30/2015    Shingles Vaccine (1 of 2) 03/30/2015    Colon Cancer Screen FIT/FOBT  04/28/2018    Flu vaccine (1) 09/01/2019    Diabetic foot exam  11/07/2019    Diabetic microalbuminuria test  11/07/2019    Pneumococcal 0-64 years Vaccine (1 of 1 - PPSV23) 08/05/2020 (Originally 3/30/1971)    A1C test (Diabetic or Prediabetic)  07/10/2020    Lipid screen  07/10/2020    Potassium monitoring  07/10/2020    Creatinine monitoring  07/10/2020    Cervical cancer screen  09/04/2024    DTaP/Tdap/Td vaccine (2 - Td) 08/28/2025    Hepatitis C screen  Completed    HIV screen  Completed    Hepatitis A vaccine  Aged Out    Hib vaccine  Aged Out    Meningococcal (ACWY) vaccine  Aged Out             (applicable per patient's age: Cancer Screenings, Depression Screening, Fall Risk Screening, Immunizations)    Hemoglobin A1C (%)   Date Value   07/10/2019 8.0   08/29/2018 7.5   12/05/2017 6.9     Microalb/Crt. Ratio (mcg/mg creat)   Date Value   11/07/2018 CANNOT BE CALCULATED     LDL Calculated (mg/dL)   Date Value   07/10/2019 87     AST (U/L)   Date Value   07/10/2019 29     ALT (U/L)   Date Value   07/10/2019 47     BUN (mg/dL)   Date Value   07/10/2019 13      (goal A1C is < 7)   (goal LDL is <100) need 30-50% reduction from baseline     BP Readings from Last 3 Encounters:   09/23/19 124/72   09/04/19 135/80   08/07/19 126/82    (goal /80)      All Future Testing planned in [facility]:  Lab Frequency Next Occurrence   Cologuard Once 08/14/2019   MAURY DIGITAL SCREENING AUGMENTED BILATERAL Once 03/04/2020   PAP Smear Once 09/04/2019   Hemoglobin A1C Once 12/23/2019   Comprehensive Metabolic Panel Once 92/04/8425   Lipid Panel Once 12/23/2019       Next Visit Date:  No future appointments.          Patient Active Problem List:     Back pain     Spasm of muscle     Abnormal weight gain     Migraine     Tachycardia     DDD (degenerative disc disease), cervical     Labile blood pressure     Mild obstructive sleep apnea     DAV (generalized anxiety disorder)     Essential hypertension     Type 2 diabetes mellitus without complication, without long-term current use of insulin ([facility]CC)     Insomnia

## 2020-03-13 ENCOUNTER — TELEPHONE (OUTPATIENT)
Dept: FAMILY MEDICINE CLINIC | Age: 55
End: 2020-03-13

## 2020-03-13 ENCOUNTER — OFFICE VISIT (OUTPATIENT)
Dept: FAMILY MEDICINE CLINIC | Age: 55
End: 2020-03-13
Payer: COMMERCIAL

## 2020-03-13 VITALS
DIASTOLIC BLOOD PRESSURE: 62 MMHG | HEART RATE: 92 BPM | HEIGHT: 60 IN | RESPIRATION RATE: 18 BRPM | BODY MASS INDEX: 33.51 KG/M2 | OXYGEN SATURATION: 95 % | WEIGHT: 170.7 LBS | SYSTOLIC BLOOD PRESSURE: 128 MMHG | TEMPERATURE: 97.3 F

## 2020-03-13 PROBLEM — M47.899 FACET SYNDROME: Status: ACTIVE | Noted: 2020-03-13

## 2020-03-13 PROBLEM — M51.26 DISPLACEMENT OF LUMBAR INTERVERTEBRAL DISC WITHOUT MYELOPATHY: Status: ACTIVE | Noted: 2020-03-13

## 2020-03-13 PROBLEM — G89.29 CHRONIC PAIN: Status: ACTIVE | Noted: 2020-03-13

## 2020-03-13 PROCEDURE — 99406 BEHAV CHNG SMOKING 3-10 MIN: CPT | Performed by: NURSE PRACTITIONER

## 2020-03-13 PROCEDURE — 99214 OFFICE O/P EST MOD 30 MIN: CPT | Performed by: NURSE PRACTITIONER

## 2020-03-13 RX ORDER — METFORMIN HYDROCHLORIDE 750 MG/1
750 TABLET, EXTENDED RELEASE ORAL 2 TIMES DAILY WITH MEALS
Qty: 180 TABLET | Refills: 1 | Status: SHIPPED | OUTPATIENT
Start: 2020-03-13 | End: 2020-07-17 | Stop reason: SDUPTHER

## 2020-03-13 RX ORDER — VARENICLINE TARTRATE
KIT
Qty: 42 TABLET | Refills: 0 | Status: SHIPPED | OUTPATIENT
Start: 2020-03-13 | End: 2020-04-16 | Stop reason: ALTCHOICE

## 2020-03-13 RX ORDER — GLUCOSAMINE HCL/CHONDROITIN SU 500-400 MG
CAPSULE ORAL
Qty: 100 STRIP | Refills: 5 | Status: SHIPPED | OUTPATIENT
Start: 2020-03-13 | End: 2022-03-07

## 2020-03-13 RX ORDER — LANCETS 30 GAUGE
EACH MISCELLANEOUS
Qty: 100 EACH | Refills: 3 | Status: SHIPPED | OUTPATIENT
Start: 2020-03-13 | End: 2023-01-09

## 2020-03-13 ASSESSMENT — ENCOUNTER SYMPTOMS
CONSTIPATION: 0
SINUS PRESSURE: 0
BLURRED VISION: 0
TROUBLE SWALLOWING: 0
VOMITING: 0
CHEST TIGHTNESS: 0
COUGH: 0
SORE THROAT: 0
RHINORRHEA: 0
ABDOMINAL PAIN: 0
VISUAL CHANGE: 0
NAUSEA: 0
WHEEZING: 0
DIARRHEA: 0
SHORTNESS OF BREATH: 0
BACK PAIN: 0

## 2020-03-13 ASSESSMENT — PATIENT HEALTH QUESTIONNAIRE - PHQ9
SUM OF ALL RESPONSES TO PHQ QUESTIONS 1-9: 0
2. FEELING DOWN, DEPRESSED OR HOPELESS: 0
SUM OF ALL RESPONSES TO PHQ9 QUESTIONS 1 & 2: 0
1. LITTLE INTEREST OR PLEASURE IN DOING THINGS: 0
SUM OF ALL RESPONSES TO PHQ QUESTIONS 1-9: 0

## 2020-03-13 NOTE — PROGRESS NOTES
hydrochlorothiazide (MICROZIDE) 12.5 MG capsule Take 1 capsule by mouth every morning 90 capsule 1    amitriptyline (ELAVIL) 25 MG tablet Take 1 tablet by mouth nightly 90 tablet 1    losartan (COZAAR) 50 MG tablet Take 1 tablet by mouth daily 90 tablet 1    metaxalone (SKELAXIN) 800 MG tablet Take 1 tablet by mouth nightly as needed for Pain (muscle spasms) 30 tablet 0    atorvastatin (LIPITOR) 20 MG tablet Take 1 tablet by mouth daily 90 tablet 0    aspirin EC 81 MG EC tablet Take 1 tablet by mouth daily 30 tablet     vitamin C (ASCORBIC ACID) 500 MG tablet Take 500 mg by mouth daily      magnesium oxide (MAG-OX) 400 MG tablet Take 400 mg by mouth daily      Saccharomyces boulardii (PROBIOTIC) 250 MG CAPS Take 250 mg by mouth daily      Blood Glucose Monitoring Suppl (ONE TOUCH ULTRA 2) w/Device KIT USE TO CHECK BLOOD SUGAR AS DIRECTED  0    FLECTOR 1.3 % patch Place 1.3 patches onto the skin  0    Melatonin 1 MG Take 3 tablets by mouth daily as needed (sleep) 90 tablet 0    oxyCODONE-acetaminophen (PERCOCET) 5-325 MG per tablet Take 1 tablet by mouth 3 times daily as needed  0    Cholecalciferol (VITAMIN D) 2000 UNITS CAPS capsule Take 1 capsule by mouth daily.  Omega 3 1000 MG CAPS Take  by mouth daily.  Semaglutide,0.25 or 0.5MG/DOS, (OZEMPIC, 0.25 OR 0.5 MG/DOSE,) 2 MG/1.5ML SOPN Inject 0.5 mg into the skin once a week (Patient not taking: Reported on 3/13/2020) 4 pen 5     No current facility-administered medications for this visit.       Allergies   Allergen Reactions    Amaryl [Glimepiride] Other (See Comments)     Low blood sugar    Farxiga [Dapagliflozin] Other (See Comments)     Yeast infection    Penicillins        Health Maintenance   Topic Date Due    Hepatitis B vaccine (1 of 3 - Risk 3-dose series) 03/30/1984    Diabetic retinal exam  09/23/2011    Breast cancer screen  03/30/2015    Shingles Vaccine (1 of 2) 03/30/2015    Colon Cancer Screen FIT/FOBT  04/28/2018    Judgment normal.          Assessment:      1. Type 2 diabetes mellitus without complication, without long-term current use of insulin (HCC)  - blood glucose monitor strips; Test 2 times a day & as needed for symptoms of irregular blood glucose. Dispense: 100 strip; Refill: 5  - metFORMIN (GLUCOPHAGE-XR) 750 MG extended release tablet; Take 1 tablet by mouth 2 times daily (with meals)  Dispense: 180 tablet; Refill: 1  - Lancets MISC; Please dispense lancets covered per insurance  Dispense: 100 each; Refill: 3  - Lipid Panel; Future  - Hemoglobin A1C; Future  Patient admits that she has not taken her Ozempic in approximately 2 months due to high cost.  Offered to give patient sample, however we would not be able to do this all the time. Patient offered good Rx card. Upon further review the medication will cause significantly more as opposed to using her insurance. 2. Essential hypertension  - CBC; Future  - Comprehensive Metabolic Panel, Fasting; Future    3. Tobacco dependence  - varenicline (CHANTIX STARTING MONTH PAK) 0.5 MG X 11 & 1 MG X 42 tablet; Take by mouth. Dispense: 42 tablet; Refill: 0  Patient did smoke in the past.  She quit smoking successfully with use of Chantix. She was a non-smoker for approximately 7 years. Patient did start smoking again within the past year. She averages half a pack to 1 pack/day. 4. Screening for cardiovascular condition  - CBC; Future  - Lipid Panel; Future  - TSH with Reflex; Future  - Comprehensive Metabolic Panel, Fasting; Future     We will recheck patient's hemoglobin A1c. Plan of care with dicussed with patient, if any changes need to be made to her medication we will do so when A1c is resulted    Plan:      No follow-ups on file.   Orders Placed This Encounter   Procedures    CBC     Standing Status:   Future     Standing Expiration Date:   3/13/2021    Lipid Panel     Standing Status:   Future     Standing Expiration Date:   3/13/2021     Order Specific Question:   Is Patient Fasting?/# of Hours     Answer:   yes    TSH with Reflex     Standing Status:   Future     Standing Expiration Date:   3/13/2021    Comprehensive Metabolic Panel, Fasting     Standing Status:   Future     Standing Expiration Date:   3/13/2021    Hemoglobin A1C     Standing Status:   Future     Standing Expiration Date:   3/13/2021     Orders Placed This Encounter   Medications    blood glucose monitor strips     Sig: Test 2 times a day & as needed for symptoms of irregular blood glucose. Dispense:  100 strip     Refill:  5     Brand per patient preference. May round up to next available package size.  metFORMIN (GLUCOPHAGE-XR) 750 MG extended release tablet     Sig: Take 1 tablet by mouth 2 times daily (with meals)     Dispense:  180 tablet     Refill:  1    Lancets MISC     Sig: Please dispense lancets covered per insurance     Dispense:  100 each     Refill:  3    varenicline (CHANTIX STARTING MONTH PAK) 0.5 MG X 11 & 1 MG X 42 tablet     Sig: Take by mouth. Dispense:  42 tablet     Refill:  0       Patient given educational materials - see patient instructions. Discussed use, benefit, and side effects of prescribed medications. All patient questions answered. Pt voiced understanding. Reviewed health maintenance. Instructed to continue current medications, diet and exercise. Patient agreed with treatment plan. Follow up as directed below.      Electronically signed by MICHELA Wei on 3/13/2020 at 12:42 PM

## 2020-03-13 NOTE — TELEPHONE ENCOUNTER
Left a voice and My Chart message to inform patient to contact office for  Recommendations of Ozempic

## 2020-03-19 ENCOUNTER — TELEPHONE (OUTPATIENT)
Dept: OBGYN CLINIC | Age: 55
End: 2020-03-19

## 2020-03-19 RX ORDER — LOSARTAN POTASSIUM 50 MG/1
50 TABLET ORAL DAILY
Qty: 90 TABLET | Refills: 1 | Status: SHIPPED | OUTPATIENT
Start: 2020-03-19 | End: 2020-07-10 | Stop reason: SDUPTHER

## 2020-03-19 RX ORDER — AMITRIPTYLINE HYDROCHLORIDE 25 MG/1
25 TABLET, FILM COATED ORAL NIGHTLY
Qty: 30 TABLET | Refills: 1 | Status: SHIPPED | OUTPATIENT
Start: 2020-03-19 | End: 2020-04-16 | Stop reason: SDUPTHER

## 2020-03-19 NOTE — TELEPHONE ENCOUNTER
Left a message to see if she got her Mamm done yet and if so where. Sending out a letter and extending the time.

## 2020-03-19 NOTE — TELEPHONE ENCOUNTER
LOV 3-13-20  LRF 9-23-19    Health Maintenance   Topic Date Due    Hepatitis B vaccine (1 of 3 - Risk 3-dose series) 03/30/1984    Diabetic retinal exam  09/23/2011    Breast cancer screen  03/30/2015    Shingles Vaccine (1 of 2) 03/30/2015    Colon Cancer Screen FIT/FOBT  04/28/2018    Flu vaccine (1) 09/01/2019    Diabetic foot exam  11/07/2019    Diabetic microalbuminuria test  11/07/2019    Pneumococcal 0-64 years Vaccine (1 of 1 - PPSV23) 08/05/2020 (Originally 3/30/1971)    A1C test (Diabetic or Prediabetic)  07/10/2020    Lipid screen  07/10/2020    Potassium monitoring  07/10/2020    Creatinine monitoring  07/10/2020    Cervical cancer screen  09/04/2024    DTaP/Tdap/Td vaccine (2 - Td) 08/28/2025    Hepatitis C screen  Completed    HIV screen  Completed    Hepatitis A vaccine  Aged Out    Hib vaccine  Aged Out    Meningococcal (ACWY) vaccine  Aged Out             (applicable per patient's age: Cancer Screenings, Depression Screening, Fall Risk Screening, Immunizations)    Hemoglobin A1C (%)   Date Value   07/10/2019 8.0   08/29/2018 7.5   12/05/2017 6.9     Microalb/Crt.  Ratio (mcg/mg creat)   Date Value   11/07/2018 CANNOT BE CALCULATED     LDL Calculated (mg/dL)   Date Value   07/10/2019 87     AST (U/L)   Date Value   07/10/2019 29     ALT (U/L)   Date Value   07/10/2019 47     BUN (mg/dL)   Date Value   07/10/2019 13      (goal A1C is < 7)   (goal LDL is <100) need 30-50% reduction from baseline     BP Readings from Last 3 Encounters:   03/13/20 128/62   09/23/19 124/72   09/04/19 135/80    (goal /80)      All Future Testing planned in [facility]:  Lab Frequency Next Occurrence   Cologuard Once 08/14/2019   MAURY DIGITAL SCREENING AUGMENTED BILATERAL Once 03/04/2020   PAP Smear Once 09/04/2019   Hemoglobin A1C Once 12/23/2019   Comprehensive Metabolic Panel Once 36/32/0296   Lipid Panel Once 12/23/2019   CBC Once 03/13/2020   Lipid Panel Once 03/13/2020   TSH with Reflex Once 03/13/2020   Comprehensive Metabolic Panel, Fasting Once 03/13/2020   Hemoglobin A1C Once 03/13/2020       Next Visit Date:  No future appointments.          Patient Active Problem List:     Back pain     Spasm of muscle     Abnormal weight gain     Migraine     Tachycardia     DDD (degenerative disc disease), cervical     Labile blood pressure     Mild obstructive sleep apnea     DAV (generalized anxiety disorder)     Essential hypertension     Type 2 diabetes mellitus without complication, without long-term current use of insulin (HCC)     Insomnia     Chronic pain     Displacement of lumbar intervertebral disc without myelopathy     Facet syndrome

## 2020-04-15 RX ORDER — VARENICLINE TARTRATE
KIT
Qty: 42 TABLET | Refills: 0 | Status: CANCELLED | OUTPATIENT
Start: 2020-04-15

## 2020-04-15 NOTE — TELEPHONE ENCOUNTER
Smear Once 09/04/2019   Hemoglobin A1C Once 12/23/2019   Comprehensive Metabolic Panel Once 83/72/5567   Lipid Panel Once 12/23/2019   CBC Once 03/13/2020   Lipid Panel Once 03/13/2020   TSH with Reflex Once 03/13/2020   Comprehensive Metabolic Panel, Fasting Once 03/13/2020   Hemoglobin A1C Once 03/13/2020       Next Visit Date:  No future appointments.          Patient Active Problem List:     Back pain     Spasm of muscle     Abnormal weight gain     Migraine     Tachycardia     DDD (degenerative disc disease), cervical     Labile blood pressure     Mild obstructive sleep apnea     DAV (generalized anxiety disorder)     Essential hypertension     Type 2 diabetes mellitus without complication, without long-term current use of insulin (HCC)     Insomnia     Chronic pain     Displacement of lumbar intervertebral disc without myelopathy     Facet syndrome

## 2020-04-16 ENCOUNTER — TELEMEDICINE (OUTPATIENT)
Dept: FAMILY MEDICINE CLINIC | Age: 55
End: 2020-04-16
Payer: COMMERCIAL

## 2020-04-16 PROCEDURE — 99214 OFFICE O/P EST MOD 30 MIN: CPT | Performed by: NURSE PRACTITIONER

## 2020-04-16 RX ORDER — AMITRIPTYLINE HYDROCHLORIDE 25 MG/1
25 TABLET, FILM COATED ORAL NIGHTLY
Qty: 90 TABLET | Refills: 1 | Status: SHIPPED | OUTPATIENT
Start: 2020-04-16 | End: 2020-07-17 | Stop reason: SDUPTHER

## 2020-04-16 RX ORDER — VARENICLINE TARTRATE 1 MG/1
1 TABLET, FILM COATED ORAL 2 TIMES DAILY
Qty: 60 TABLET | Refills: 1 | Status: SHIPPED | OUTPATIENT
Start: 2020-04-16 | End: 2020-06-15 | Stop reason: SDUPTHER

## 2020-04-16 RX ORDER — MELOXICAM 7.5 MG/1
7.5 TABLET ORAL EVERY OTHER DAY
COMMUNITY
Start: 2020-03-16 | End: 2021-11-10 | Stop reason: SDUPTHER

## 2020-04-16 ASSESSMENT — ENCOUNTER SYMPTOMS
WHEEZING: 0
GASTROINTESTINAL NEGATIVE: 1
SHORTNESS OF BREATH: 0
CHEST TIGHTNESS: 0
COUGH: 0

## 2020-04-16 NOTE — PROGRESS NOTES
[address] [facility]   [address]   [phone]      2020      TELEHEALTH EVALUATION -- Audio/Visual (During OTG-97 public health emergency)    Visit Information    Have you changed or started any medications since your last visit including any over-the-counter medicines, vitamins, or herbal medicines? yes - Med list updated   Are you having any side effects from any of your medications? -  yes - Mild nausea and some vivid dreams  Have you stopped taking any of your medications? Is so, why? -  yes - Med list updated    Have you seen any other physician or provider since your last visit? No  Have you had any other diagnostic tests since your last visit? No  Have you been seen in the emergency room and/or had an admission to a hospital since we last saw you? No  Have you had your routine dental cleaning in the past 6 months? no    Have you activated your [employer] account? If not, what are your barriers? Yes     Patient Care Team:  PB Bianchi CNP as PCP - General (Nurse Practitioner Family)  PB Bianchi CNP as PCP - [facility] Provider      Milly Londono (:  1965) has requested an audio/video evaluation for the following concern(s): medication refill    HPI:  Patient is a pleasant 51-year-old,  woman. She presents today with virtual visit to follow-up. Patient was recently seen in the office in March and started on Chantix for tobacco cessation. Patient states she is doing well and taking medication as prescribed. She did initially have some mild nausea, and some vivid dreams initially however this is since subsided. Patient is slowly decreasing her nicotine use. Patient comments that today she is hopeful that she smokes no cigarettes.   Patient does admit that this is a gradual process as she was successful with Chantix in the past.  Patient is currently home during recent pandemic. She is nonessential worker. Patient owns her own business, she is a love. Patient does admit that she has not been checking her blood sugars that she should be. In addition she admits that she is not making healthy food choices. Stressed to patient to continue checking her blood sugars to make sure that her blood sugars do not become too elevated, therefore causing any type of neuropathies, nephropathies, or circulation concerns  . Past Medical History:   Diagnosis Date    Cervical disc disease     Chronic back pain     Diabetes mellitus ([address]     Fibromyalgia     Hypertension      Past Surgical History:   Procedure Laterality Date    BREAST ENHANCEMENT SURGERY      CERVICAL [address] SURGERY  [address]    ANTERIOR CERVICAL MICRODISECTOMY AND FUSION C 6-7    CERVICAL DISCECTOMY  07    HEMORRHOID SURGERY      OTHER SURGICAL HISTORY  2018    fistulotomy repair, Dr Irene Gore History     Tobacco Use    Smoking status: Former Smoker     Packs/day: 1.00     Years: 30.00     Pack years: 30.00     Types: Cigarettes     Last attempt to quit: 2011     Years since quittin.4    Smokeless tobacco: Never Used    Tobacco comment: e-cig with lowest nicotine   Substance Use Topics    Alcohol use: No     Current Outpatient Medications   Medication Sig Dispense Refill    amitriptyline (ELAVIL) 25 MG tablet Take 1 tablet by mouth nightly 90 tablet 1    varenicline (CHANTIX) 1 MG tablet Take 1 tablet by mouth 2 times daily 60 tablet 1    losartan (COZAAR) 50 MG tablet Take 1 tablet by mouth daily 90 tablet 1    blood glucose monitor strips Test 2 times a day & as needed for symptoms of irregular blood glucose.  100 strip 5    metFORMIN (GLUCOPHAGE-XR) 750 MG extended release tablet Take 1 tablet by mouth 2 times daily (with meals) 180 tablet 1    Lancets MISC Please dispense lancets covered per insurance 100 each 3    hydrochlorothiazide (MICROZIDE) 12.5 MG capsule Take 1 capsule by mouth every morning 90 capsule 1    metaxalone (SKELAXIN) 800 MG tablet Take 1 tablet by mouth nightly as needed for Pain (muscle spasms) 30 tablet 0    atorvastatin (LIPITOR) 20 MG tablet Take 1 tablet by mouth daily 90 tablet 0    aspirin EC 81 MG EC tablet Take 1 tablet by mouth daily 30 tablet     vitamin C (ASCORBIC ACID) 500 MG tablet Take 500 mg by mouth daily      magnesium oxide (MAG-OX) 400 MG tablet Take 400 mg by mouth daily      Saccharomyces boulardii (PROBIOTIC) 250 MG CAPS Take 250 mg by mouth daily      Blood Glucose Monitoring Suppl (ONE TOUCH ULTRA 2) w/Device KIT USE TO CHECK BLOOD SUGAR AS DIRECTED  0    FLECTOR 1.3 % patch Place 1.3 patches onto the skin  0    Melatonin 1 MG Take 3 tablets by mouth daily as needed (sleep) 90 tablet 0    oxyCODONE-acetaminophen (PERCOCET) 5-325 MG per tablet Take 1 tablet by mouth 3 times daily as needed  0    Cholecalciferol (VITAMIN D) 2000 UNITS CAPS capsule Take 1 capsule by mouth daily.  Omega 3 1000 MG CAPS Take  by mouth daily.  meloxicam (MOBIC) 7.5 MG tablet Take 7.5 mg by mouth every other day      Semaglutide,0.25 or 0.5MG/DOS, (OZEMPIC, 0.25 OR 0.5 MG/DOSE,) 2 MG/1.5ML SOPN Inject 0.5 mg into the skin once a week (Patient not taking: Reported on 4/16/2020) 4 pen 5     No current facility-administered medications for this visit. Allergies   Allergen Reactions    Amaryl [Glimepiride] Other (See Comments)     Low blood sugar    Farxiga [Dapagliflozin] Other (See Comments)     Yeast infection    Penicillins        Subjective:  Review of Systems   Constitutional: Negative for activity change, appetite change, chills, fatigue and fever. HENT: Negative. Respiratory: Negative for cough, chest tightness, shortness of breath and wheezing. Cardiovascular: Negative for chest pain, palpitations and leg swelling. healthy food choices. Plan:  Return in about 6 months (around 10/16/2020) for Follow up as needed. An  electronic signature was used to authenticate this note. Communication:  Items for Patient/Writer/Staff to Craig Chang  Please call patient for follow up appointment to be scheduled as discussed in [address] for 6 month    --Dayana Sanon, APRN - CNP on 4/16/2020 at 12:30 PM19}    This is a telehealth visit that was performed with the originating site at Patient Location: home and Provider Location of [place], [place]. Verbal consent to participate in video visit was obtained. Pursuant to the emergency declaration under the [facility], [facility] waiver authority and the [employer] Act, this Virtual Visit was conducted, with patient's consent, to reduce the patient's risk of exposure to COVID-19 and provide continuity of care for an established/new patient. Services were provided through a video synchronous discussion virtually to substitute for in-person clinic visit. I discussed with the patient the nature of our telehealth visits via interactive/real-time audio/video that:  - I would evaluate the patient and recommend diagnostics and treatments based on my assessment  - Our sessions are not being recorded and that personal health information is protected  - Our team would provide follow up care in person if/when the patient needs it. Services were provided through a video synchronous discussion virtually to substitute for in-person clinic visit.

## 2020-06-15 ENCOUNTER — E-VISIT (OUTPATIENT)
Dept: FAMILY MEDICINE CLINIC | Age: 55
End: 2020-06-15

## 2020-06-16 RX ORDER — VARENICLINE TARTRATE 1 MG/1
1 TABLET, FILM COATED ORAL 2 TIMES DAILY
Qty: 60 TABLET | Refills: 1 | Status: SHIPPED | OUTPATIENT
Start: 2020-06-16 | End: 2020-07-17 | Stop reason: SDUPTHER

## 2020-07-07 ENCOUNTER — PATIENT MESSAGE (OUTPATIENT)
Dept: FAMILY MEDICINE CLINIC | Age: 55
End: 2020-07-07

## 2020-07-10 RX ORDER — LOSARTAN POTASSIUM 50 MG/1
50 TABLET ORAL DAILY
Qty: 90 TABLET | Refills: 1 | Status: SHIPPED | OUTPATIENT
Start: 2020-07-10 | End: 2020-07-22

## 2020-07-10 NOTE — TELEPHONE ENCOUNTER
Last OARRS Review-03/19/2020  Last Office Visit- 04/16/2020  Return To Office-6 months   Next Appointment Date-0  Last Refill Date-03/19/2020  90 tabs  Number of Refills Given- 1      Health Maintenance   Topic Date Due    Hepatitis B vaccine (1 of 3 - Risk 3-dose series) 03/30/1984    Diabetic retinal exam  09/23/2011    Breast cancer screen  03/30/2015    Shingles Vaccine (1 of 2) 03/30/2015    Colon Cancer Screen FIT/FOBT  04/28/2018    Diabetic foot exam  11/07/2019    Diabetic microalbuminuria test  11/07/2019    Low dose CT lung screening  03/30/2020    A1C test (Diabetic or Prediabetic)  07/10/2020    Potassium monitoring  07/10/2020    Creatinine monitoring  07/10/2020    Lipid screen  07/10/2020    Pneumococcal 0-64 years Vaccine (1 of 1 - PPSV23) 08/05/2020 (Originally [birth year]30[birth year]1971)    Flu vaccine (1) 09/01/2020    Cervical cancer screen  09/04/2024    DTaP/Tdap/Td vaccine (2 - Td) 08/28/2025    Hepatitis C screen  Completed    HIV screen  Completed    Hepatitis A vaccine  Aged Out    Hib vaccine  Aged Out    Meningococcal (ACWY) vaccine  Aged Out             (applicable per patient's age: Cancer Screenings, Depression Screening, Fall Risk Screening, Immunizations)    Hemoglobin A1C (%)   Date Value   07/10/2019 8.0   08/29/2018 7.5   12/05/2017 6.9     Microalb/Crt.  Ratio (mcg/mg creat)   Date Value   11/07/2018 CANNOT BE CALCULATED     LDL Calculated (mg/dL)   Date Value   07/10/2019 87     AST (U/L)   Date Value   07/10/2019 29     ALT (U/L)   Date Value   07/10/2019 47     BUN (mg/dL)   Date Value   07/10/2019 13      (goal A1C is < 7)   (goal LDL is <100) need 30-50% reduction from baseline     BP Readings from Last 3 Encounters:   03/13/20 128/62   09/23/19 124/72   09/04/19 135/80    (goal /80)      All Future Testing planned in [facility]:  Lab Frequency Next Occurrence   Cologuard Once 08/14/2019   MAURY DIGITAL SCREENING AUGMENTED BILATERAL Once 09/04/2020   PAP Smear Once 09/04/2019   Hemoglobin A1C Once 12/23/2019   Comprehensive Metabolic Panel Once 43/11/2982   Lipid Panel Once 12/23/2019   CBC Once 03/13/2020   Lipid Panel Once 03/13/2020   TSH with Reflex Once 03/13/2020   Comprehensive Metabolic Panel, Fasting Once 03/13/2020   Hemoglobin A1C Once 03/13/2020       Next Visit Date:  No future appointments.          Patient Active Problem List:     Back pain     Spasm of muscle     Abnormal weight gain     Migraine     Tachycardia     DDD (degenerative disc disease), cervical     Labile blood pressure     Mild obstructive sleep apnea     DAV (generalized anxiety disorder)     Essential hypertension     Type 2 diabetes mellitus without complication, without long-term current use of insulin (HCC)     Insomnia     Chronic pain     Displacement of lumbar intervertebral disc without myelopathy     Facet syndrome

## 2020-07-10 NOTE — TELEPHONE ENCOUNTER
From: Nidia Corona  Sent: 7/10/2020 7:34 AM EDT  To: Eleazar Riedel [facility] Clinical Support Pool  Subject: RE: Visit Follow-Up Question    Okay, I still need to schedule an appointment. I have been out of my blood pressure medication for a couple of weeks, and my blood pressure has been extremely high, I was having a lot of chest tightness and pain yesterday. ..medication is I can't remember the name of the medication, it starts with an l. Shouldn't it be in my chart? ?? I ordered a refill from [facility], and is on its way     ----- Message -----  From: Justus Edwards  Sent: 7/9/20 4:20 PM  To: Urvashi Wolf  Subject: RE: Visit Follow-Up Question    Dear Nusrat Spence,     We do not do the COVID testing at our office. We do have a [facility] clinic that does the testing. Below is their information. [address] [facility]  [address] [facility] [address]  [facility]: [phone]  Open M-F 8-430P, Saturdays 10-4, CLOSED Sundays    This is a walk in clinic and they are able to order the testing and perform the testing at the site. Sincerely,     [address] [facility]       ----- Message -----   From:Urvashi Wolf   Sent:7/7/2020 3:17 PM EDT   To:Dayana Holder, APRN - CNP   Subject:Visit Follow-Up Question    Could I set up an appointment to be tested for covid? I [facility] to see my new born granddaughter, and want to make sure that I am negative. - - - DISCLAIMER - - -  [email or web address][This message may contain formatting that cannot [email or web address] shown on this site.]

## 2020-07-17 ENCOUNTER — TELEMEDICINE (OUTPATIENT)
Dept: FAMILY MEDICINE CLINIC | Age: 55
End: 2020-07-17
Payer: COMMERCIAL

## 2020-07-17 ENCOUNTER — TELEPHONE (OUTPATIENT)
Dept: FAMILY MEDICINE CLINIC | Age: 55
End: 2020-07-17

## 2020-07-17 PROBLEM — M96.1 CERVICAL POST-LAMINECTOMY SYNDROME: Status: ACTIVE | Noted: 2020-07-17

## 2020-07-17 PROBLEM — M43.10 ACQUIRED SPONDYLOLISTHESIS: Status: ACTIVE | Noted: 2020-07-17

## 2020-07-17 PROCEDURE — 4004F PT TOBACCO SCREEN RCVD TLK: CPT | Performed by: NURSE PRACTITIONER

## 2020-07-17 PROCEDURE — 2022F DILAT RTA XM EVC RTNOPTHY: CPT | Performed by: NURSE PRACTITIONER

## 2020-07-17 PROCEDURE — 3017F COLORECTAL CA SCREEN DOC REV: CPT | Performed by: NURSE PRACTITIONER

## 2020-07-17 PROCEDURE — G8417 CALC BMI ABV UP PARAM F/U: HCPCS | Performed by: NURSE PRACTITIONER

## 2020-07-17 PROCEDURE — 99214 OFFICE O/P EST MOD 30 MIN: CPT | Performed by: NURSE PRACTITIONER

## 2020-07-17 PROCEDURE — G8427 DOCREV CUR MEDS BY ELIG CLIN: HCPCS | Performed by: NURSE PRACTITIONER

## 2020-07-17 PROCEDURE — 3046F HEMOGLOBIN A1C LEVEL >9.0%: CPT | Performed by: NURSE PRACTITIONER

## 2020-07-17 RX ORDER — SEMAGLUTIDE 1.34 MG/ML
0.5 INJECTION, SOLUTION SUBCUTANEOUS WEEKLY
Qty: 4 PEN | Refills: 5 | Status: SHIPPED | OUTPATIENT
Start: 2020-07-17 | End: 2020-07-28 | Stop reason: DRUGHIGH

## 2020-07-17 RX ORDER — AMITRIPTYLINE HYDROCHLORIDE 25 MG/1
50 TABLET, FILM COATED ORAL NIGHTLY
Qty: 60 TABLET | Refills: 2 | Status: SHIPPED | OUTPATIENT
Start: 2020-07-17 | End: 2020-11-25

## 2020-07-17 RX ORDER — AMPICILLIN TRIHYDRATE 250 MG
200 CAPSULE ORAL DAILY
COMMUNITY

## 2020-07-17 RX ORDER — METFORMIN HYDROCHLORIDE 750 MG/1
750 TABLET, EXTENDED RELEASE ORAL 2 TIMES DAILY WITH MEALS
Qty: 180 TABLET | Refills: 1 | Status: SHIPPED | OUTPATIENT
Start: 2020-07-17 | End: 2020-07-22

## 2020-07-17 RX ORDER — VARENICLINE TARTRATE 1 MG/1
1 TABLET, FILM COATED ORAL 2 TIMES DAILY
Qty: 60 TABLET | Refills: 1 | Status: SHIPPED | OUTPATIENT
Start: 2020-07-17 | End: 2020-10-12

## 2020-07-17 ASSESSMENT — ENCOUNTER SYMPTOMS
SINUS PRESSURE: 0
BACK PAIN: 1
ABDOMINAL PAIN: 0
SORE THROAT: 0
BLURRED VISION: 0
WHEEZING: 0
DIARRHEA: 0
RHINORRHEA: 0
CHEST TIGHTNESS: 1
NAUSEA: 0
CONSTIPATION: 0
SHORTNESS OF BREATH: 1
COUGH: 0
TROUBLE SWALLOWING: 0
VISUAL CHANGE: 0
VOMITING: 0

## 2020-07-17 NOTE — PROGRESS NOTES
[address] [facility]   [address]   [phone]      2020      TELEHEALTH EVALUATION -- Audio/Visual (During TABZT-68 public health emergency)    Visit Information    Have you changed or started any medications since your last visit including any over-the-counter medicines, vitamins, or herbal medicines? no   Are you having any side effects from any of your medications? -  Metformin recalled at [facility]  Have you stopped taking any of your medications? Is so, why? -  yes - Med list updated    Have you seen any other physician or provider since your last visit? No  Have you had any other diagnostic tests since your last visit? No  Have you been seen in the emergency room and/or had an admission to a hospital since we last saw you? No  Have you had your routine dental cleaning in the past 6 months? no    Have you activated your [employer] account? If not, what are your barriers? Yes     Patient Care Team:  BP Randall CNP as PCP - General (Nurse Practitioner Family)  PB Randall CNP as PCP - [facility]      Mari Ravi (:  1965) has requested an audio/video evaluation for the following concern(s): anxiety, depression, HTN    HPI:  Patient is a 80-year-old  female who presents for virtual visit today for ongoing concerns with her hypertension and diabetes. Patient also having ongoing issues with her depression and anxiety. Patient has been being treated with Chantix for many months now. Patient does admit that over the past few months she has had significant stressors within her life that is causing her to smoke on and off again, while she is using the Chantix. Patient's last cigarette was last Monday. Patient goes on to describe many of her ongoing concerns, many with her insurance company, due to medical claims.   Patient also states that her dog had a litter of puppies, the puppies are okay, however the mom became ill. Patient also states that her brother is been staying with her after his recent stroke. History of caused a lot of memory and cognitive issues. It affected the frontal lobe of his brain which change his personality. Patient also very sad in that her father was diagnosed with stage V kidney disease. He is living in [place], so she is unable to visit or help him as needed. Patient also has a new granddaughter which lives in [facility]. Due to recent pandemic she is unable to visit her and has never seen her yet. Patient states she is checking her blood pressure at home. Patient gave me most recent vital signs as . .. 161/80  177/85  164/76  150/70  134/76  142/73 this am    Does go to pain management on a monthly basis. She recently had an appointment approximately a week ago. At that time her blood pressure was elevated she was told to go to the emergency room for further evaluation. Patient denied need therefore did not go. Patient also does admit to chest pain at times. She states her chest pain is only when she is feeling very anxious and overwhelmed. Again patient denying any emergency evaluation. And also want to stress to me the significant family medical history. Patient states both of her parents are type II diabetics, however they have been treated as though they are insulin-dependent. Her father has significant diabetes it which she does not treat, hence him having end-stage renal failure at this time. Patient's mom also had diabetes and passed at the age of 71 after significant coronary artery disease, and bypass surgery. Patient mitts that she does not check her blood sugar as she should. She did check this morning knowing she had an appointment and it was 275, last night she was 224, and the highest she has been in a while was on 7/13 at 302.     Care team includes:  Pain management on a monthly basis. They prescribed patient her muscle relaxer and her Percocet. Patient states she has done injections in the past as well as pain blocks. Patient is also had surgical intervention. Patient is tried physical therapy with no relief of symptoms. Patient had another follow-up appointment with a neurosurgeon who recommended an additional lumbar fusion. Patient's been diagnosed with facet syndrome and significant arthritis as well. Patient is denying the want or desire for any additional surgical intervention at this time. Diabetes   She presents for her follow-up diabetic visit. She has type 2 diabetes mellitus. Her disease course has been fluctuating. There are no hypoglycemic associated symptoms. Pertinent negatives for hypoglycemia include no dizziness or headaches. Pertinent negatives for diabetes include no blurred vision, no chest pain, no fatigue, no foot ulcerations, no visual change, no weakness and no weight loss. There are no hypoglycemic complications. Current diabetic treatment includes oral agent (monotherapy) and insulin injections. She is compliant with treatment most of the time. Her weight is stable. She is following a generally unhealthy diet. When asked about meal planning, she reported none. She rarely participates in exercise. Her home blood glucose trend is fluctuating minimally. Her overall blood glucose range is >200 mg/dl. An ACE inhibitor/angiotensin II receptor blocker is being taken. Hypertension   This is a chronic problem. The current episode started more than 1 year ago. The problem has been waxing and waning since onset. Associated symptoms include malaise/fatigue, palpitations and shortness of breath (w/exertion ). Pertinent negatives include no blurred vision, chest pain, headaches or peripheral edema. Risk factors for coronary artery disease include obesity, sedentary lifestyle, stress, post-menopausal state and diabetes mellitus.  Past treatments include angiotensin blockers. The current treatment provides mild improvement. Compliance problems include exercise and diet. Anxiety: Patient complains of evaluation of anxiety disorder, panic attacks and sleep disturbance. She has the following anxiety symptoms: chest pain, difficulty concentrating, fatigue, insomnia, irritable, palpitations. Onset of symptoms was approximately several months ago, gradually worsening since that time. She denies current suicidal and homicidal ideation. Possible organic causes contributing are: endocrine/metabolic. Risk factors: negative life event many as charted above and previous episode of depression Previous treatment includes Elavil and none. She complains of the following side effects from the treatment: none. Depression: Patient complains of depression. She complains of depressed mood, difficulty concentrating, fatigue and insomnia. Onset was approximately several months ago, gradually worsening since that time. She denies current suicidal and homicidal plan or intent. Possible organic causes contributing are: endocrine/metabolic. Risk factors: negative life event many as listed above Previous treatment includes Elavil and none. She complains of the following side effects from the treatment: none.     Past Medical History:   Diagnosis Date    Cervical disc disease     Chronic back pain     Diabetes mellitus ([address]     Fibromyalgia     Hypertension      Past Surgical History:   Procedure Laterality Date    BREAST ENHANCEMENT SURGERY      CERVICAL [address] [employer] SURGERY  03/21/2104    ANTERIOR CERVICAL MICRODISECTOMY AND FUSION C 6-7    CERVICAL DISCECTOMY  07    HEMORRHOID SURGERY      OTHER SURGICAL HISTORY  08/2018    fistulotomy repair, Dr Socorro Juan History     Tobacco Use    Smoking status: Light Tobacco Smoker     Packs/day: 1.00     Years: 30.00     Pack years: 30.00     Types: Cigarettes     Last attempt to quit: 2011     Years since quittin.6    Smokeless tobacco: Never Used    Tobacco comment: e-cig with lowest nicotine   Substance Use Topics    Alcohol use: No     Current Outpatient Medications   Medication Sig Dispense Refill    Coenzyme Q10 (COQ10) 200 MG CAPS Take 200 mg by mouth daily      metFORMIN (GLUCOPHAGE-XR) 750 MG extended release tablet Take 1 tablet by mouth 2 times daily (with meals) 180 tablet 1    varenicline (CHANTIX) 1 MG tablet Take 1 tablet by mouth 2 times daily 60 tablet 1    Semaglutide,0.25 or 0.5MG/DOS, (OZEMPIC, 0.25 OR 0.5 MG/DOSE,) 2 MG/1.5ML SOPN Inject 0.5 mg into the skin once a week 4 pen 5    amitriptyline (ELAVIL) 25 MG tablet Take 2 tablets by mouth nightly 60 tablet 2    losartan (COZAAR) 50 MG tablet Take 1 tablet by mouth daily 90 tablet 1    meloxicam (MOBIC) 7.5 MG tablet Take 7.5 mg by mouth every other day      blood glucose monitor strips Test 2 times a day & as needed for symptoms of irregular blood glucose.  100 strip 5    Lancets MISC Please dispense lancets covered per insurance 100 each 3    hydrochlorothiazide (MICROZIDE) 12.5 MG capsule Take 1 capsule by mouth every morning 90 capsule 1    metaxalone (SKELAXIN) 800 MG tablet Take 1 tablet by mouth nightly as needed for Pain (muscle spasms) 30 tablet 0    atorvastatin (LIPITOR) 20 MG tablet Take 1 tablet by mouth daily (Patient taking differently: Take 10 mg by mouth daily ) 90 tablet 0    aspirin EC 81 MG EC tablet Take 1 tablet by mouth daily 30 tablet     vitamin C (ASCORBIC ACID) 500 MG tablet Take 500 mg by mouth daily      magnesium oxide (MAG-OX) 400 MG tablet Take 400 mg by mouth daily      Saccharomyces boulardii (PROBIOTIC) 250 MG CAPS Take 250 mg by mouth daily      Blood Glucose Monitoring Suppl (ONE TOUCH ULTRA 2) w/Device KIT USE TO CHECK BLOOD SUGAR AS DIRECTED  0    FLECTOR 1.3 % patch Place 1.3 patches onto the skin  0    Melatonin 1 MG Take 3 tablets by mouth daily as needed (sleep) 90 tablet 0    oxyCODONE-acetaminophen (PERCOCET) 5-325 MG per tablet Take 1 tablet by mouth 3 times daily as needed  0    Cholecalciferol (VITAMIN D) 2000 UNITS CAPS capsule Take 1 capsule by mouth daily.  Omega 3 1000 MG CAPS Take  by mouth daily. No current facility-administered medications for this visit. Allergies   Allergen Reactions    Amaryl [Glimepiride] Other (See Comments)     Low blood sugar    Cyclobenzaprine      Other reaction(s): sedation, constiatpion, dry mouth    Duloxetine Hcl      Other reaction(s): weight gain   Dwain Mcknight [Dapagliflozin] Other (See Comments)     Yeast infection    Penicillins        Subjective:  Review of Systems   Constitutional: Positive for malaise/fatigue. Negative for activity change, appetite change, chills, fatigue, fever and weight loss. HENT: Negative for congestion, postnasal drip, rhinorrhea, sinus pressure, sore throat and trouble swallowing. Eyes: Negative for blurred vision. Respiratory: Positive for chest tightness and shortness of breath (w/exertion ). Negative for cough and wheezing. Cardiovascular: Positive for palpitations. Negative for chest pain and leg swelling. Gastrointestinal: Negative for abdominal pain, constipation, diarrhea, nausea and vomiting. Genitourinary: Positive for frequency, urgency and vaginal pain (irritatin ). Negative for difficulty urinating, dysuria, hematuria and vaginal discharge. Musculoskeletal: Positive for arthralgias, back pain and myalgias. Negative for gait problem. Chronic back pain   Neurological: Negative for dizziness, syncope, weakness, light-headedness, numbness and headaches. Psychiatric/Behavioral: Negative for agitation, decreased concentration, dysphoric mood and sleep disturbance. Mild NATTY, no CPAP       Objective:  Physical Exam  Constitutional:       Appearance: Normal appearance. She is well-developed and well-groomed.    HENT:      Right Ear: Hearing normal.      Left Ear: Hearing normal.      Mouth/Throat:      Lips: Pink. Pulmonary:      Effort: Pulmonary effort is normal. No respiratory distress. Skin:     Coloration: Skin is not cyanotic, jaundiced or pale. Neurological:      Mental Status: She is alert and oriented to person, place, and time. Psychiatric:         Attention and Perception: Attention and perception normal.         Mood and Affect: Mood is depressed. Affect is tearful. Speech: Speech normal.         Behavior: Behavior is cooperative. Thought Content: Thought content normal. Thought content does not include suicidal ideation. Thought content does not include suicidal plan. Cognition and Memory: Cognition and memory normal.         Judgment: Judgment normal.         Due to this being a TeleHealth encounter, evaluation of the following organ systems is limited: Vitals/Constitutional/EENT/Resp/CV/GI//MS/Neuro/Skin/Heme-Lymph-Imm. Assessment:  1. Current severe episode of major depressive disorder without psychotic features without prior episode ([facility] [address]  -Worsening   - [employer]    2. DAV (generalized anxiety disorder)  -Worsening   - amitriptyline (ELAVIL) 25 MG tablet; Take 2 tablets by mouth nightly  Dispense: 60 tablet; Refill: 2  - [employer]    3. Insomnia, unspecified type    4. Type 2 diabetes mellitus without complication, without long-term current use of insulin (HCC)  -Worsening   - metFORMIN (GLUCOPHAGE-XR) 750 MG extended release tablet; Take 1 tablet by mouth 2 times daily (with meals)  Dispense: 180 tablet; Refill: 1  - Semaglutide,0.25 or 0.5MG/DOS, (OZEMPIC, 0.25 OR 0.5 MG/DOSE,) 2 MG/1.5ML SOPN; Inject 0.5 mg into the skin once a week  Dispense: 4 pen; Refill: 5    5. Tobacco dependence  -Stable, medicated, monitered   - varenicline (CHANTIX) 1 MG tablet; Take 1 tablet by mouth 2 times daily  Dispense: 60 tablet;  Refill: 1      Plan:  Return in about 3 months (around

## 2020-07-17 NOTE — TELEPHONE ENCOUNTER
Appointment Type: New Patient    Outcome of Phone Call: Left Voicemail    Referring Provider: Pema Geiger current insurance    Verify PCP (Must be a [facility] office for [facility])    Did provider ask for outreach? No    Did patient deny appointment or request an appointment at a later date? no    Are there any barriers with the patient/specific requests?  no

## 2020-07-20 LAB
ALBUMIN SERPL-MCNC: 4.8 G/DL
ALP BLD-CCNC: 86 U/L
ALT SERPL-CCNC: 44 U/L
AST SERPL-CCNC: 27 U/L
AVERAGE GLUCOSE: NORMAL
BASOPHILS ABSOLUTE: 61 /ΜL
BASOPHILS RELATIVE PERCENT: 0.7 %
BILIRUB SERPL-MCNC: 1.4 MG/DL (ref 0.1–1.4)
BILIRUBIN, URINE: NEGATIVE
BLOOD, URINE: NEGATIVE
BUN BLDV-MCNC: 16 MG/DL
CALCIUM SERPL-MCNC: 10 MG/DL
CHLORIDE BLD-SCNC: 100 MMOL/L
CHOLESTEROL, TOTAL: 149 MG/DL
CHOLESTEROL/HDL RATIO: 3.7
CLARITY: CLEAR
CO2: 29 MMOL/L
COLOR: YELLOW
CREAT SERPL-MCNC: 0.68 MG/DL
EOSINOPHILS ABSOLUTE: 209 /ΜL
EOSINOPHILS RELATIVE PERCENT: 2.4 %
GLUCOSE FASTING: 188 MG/DL
GLUCOSE URINE: NORMAL
HBA1C MFR BLD: 10.1 %
HCT VFR BLD CALC: 43.4 % (ref 36–46)
HDLC SERPL-MCNC: 40 MG/DL (ref 35–70)
HEMOGLOBIN: 14.8 G/DL (ref 12–16)
KETONES, URINE: NEGATIVE
LDL CHOLESTEROL CALCULATED: 77 MG/DL (ref 0–160)
LEUKOCYTE ESTERASE, URINE: NEGATIVE
LYMPHOCYTES ABSOLUTE: 2488 /ΜL
LYMPHOCYTES RELATIVE PERCENT: 28.6 %
MCH RBC QN AUTO: 30.8 PG
MCHC RBC AUTO-ENTMCNC: 34.1 G/DL
MCV RBC AUTO: 90.4 FL
MONOCYTES ABSOLUTE: 513 /ΜL
MONOCYTES RELATIVE PERCENT: 5.9 %
NEUTROPHILS ABSOLUTE: 5429 /ΜL
NEUTROPHILS RELATIVE PERCENT: 62.4 %
NITRITE, URINE: NEGATIVE
PH UA: 6 (ref 4.5–8)
PLATELET # BLD: 300 K/ΜL
PMV BLD AUTO: 10.2 FL
POTASSIUM SERPL-SCNC: 4.5 MMOL/L
PROTEIN UA: NEGATIVE
RBC # BLD: 4.8 10^6/ΜL
SODIUM BLD-SCNC: 138 MMOL/L
SPECIFIC GRAVITY, URINE: 1.02
TOTAL PROTEIN: 7.2 G/DL (ref 6.4–8.2)
TRIGL SERPL-MCNC: 234 MG/DL
TSH SERPL DL<=0.05 MIU/L-ACNC: 1.56 UIU/ML
UROBILINOGEN, URINE: NORMAL
VLDLC SERPL CALC-MCNC: NORMAL MG/DL
WBC # BLD: 8.7 10^3/ML

## 2020-07-22 RX ORDER — LOSARTAN POTASSIUM 50 MG/1
TABLET ORAL
Qty: 90 TABLET | Refills: 1 | Status: SHIPPED | OUTPATIENT
Start: 2020-07-22 | End: 2020-12-16 | Stop reason: DRUGHIGH

## 2020-07-22 RX ORDER — METFORMIN HYDROCHLORIDE 750 MG/1
TABLET, EXTENDED RELEASE ORAL
Qty: 180 TABLET | Refills: 1 | Status: SHIPPED | OUTPATIENT
Start: 2020-07-22 | End: 2020-09-27 | Stop reason: SDUPTHER

## 2020-07-22 RX ORDER — HYDROCHLOROTHIAZIDE 12.5 MG/1
12.5 CAPSULE, GELATIN COATED ORAL EVERY MORNING
Qty: 30 CAPSULE | Refills: 0 | Status: CANCELLED | OUTPATIENT
Start: 2020-07-22 | End: 2021-07-22

## 2020-07-22 RX ORDER — HYDROCHLOROTHIAZIDE 12.5 MG/1
12.5 CAPSULE, GELATIN COATED ORAL EVERY MORNING
Qty: 90 CAPSULE | Refills: 1 | Status: SHIPPED | OUTPATIENT
Start: 2020-07-22 | End: 2020-09-14

## 2020-07-22 NOTE — TELEPHONE ENCOUNTER
Per pt mychart msg-needs short term hctz to [facility] [place] and long [facility] to [facility]. Is completely out of medication

## 2020-07-24 ENCOUNTER — TELEPHONE (OUTPATIENT)
Dept: FAMILY MEDICINE CLINIC | Age: 55
End: 2020-07-24

## 2020-07-24 NOTE — TELEPHONE ENCOUNTER
Patient stated he requested a partial refill on blood pressure medication on July 22. Patient is out of the medication and is going on vacation on Sunday and needs the medication before she leaves. Patient needs refill to be done today. Patient would like partial refill called into [facility]. Patient needs a 7 day supply of medication. Patient also stated that the medication that she was supposed to take for diabetes is not affordable to her and wants to know what can be done.

## 2020-07-27 ENCOUNTER — TELEPHONE (OUTPATIENT)
Dept: FAMILY MEDICINE CLINIC | Age: 55
End: 2020-07-27

## 2020-07-27 NOTE — TELEPHONE ENCOUNTER
Patient states that she needs the medication sent to a local pharmacy in [place] [address] due to her being on vacation. Patient states that she is starting to have mild dizziness and a slight headache at times. Denies any SOB and chest pain.

## 2020-07-27 NOTE — TELEPHONE ENCOUNTER
Patient's hydrochlorothiazide was refilled on July 22. There is confirmed receipt at 1040 8 in the morning on July 22 to [facility]. This was a pharmacy listed during patient's visit. Please ensure patient did not receive this medication. If I do need to send a short supply to the [facility] pharmacy in [facility], I would like to know how long she is going to be there so I know how many doses to send.

## 2020-07-28 RX ORDER — SEMAGLUTIDE 1.34 MG/ML
1 INJECTION, SOLUTION SUBCUTANEOUS WEEKLY
Qty: 4 PEN | Refills: 2 | Status: SHIPPED | OUTPATIENT
Start: 2020-07-28 | End: 2020-09-14

## 2020-07-28 RX ORDER — HYDROCHLOROTHIAZIDE 12.5 MG/1
12.5 CAPSULE, GELATIN COATED ORAL EVERY MORNING
Qty: 14 CAPSULE | Refills: 0 | Status: SHIPPED | OUTPATIENT
Start: 2020-07-28 | End: 2020-09-17 | Stop reason: DRUGHIGH

## 2020-07-28 NOTE — TELEPHONE ENCOUNTER
Medication does need to have a short term supply sent to the pharmacy in [address] [place] [address]. Patient states that she will be there for a few more days. Medication and dispense amount already pending.

## 2020-08-04 ENCOUNTER — TELEPHONE (OUTPATIENT)
Dept: FAMILY MEDICINE CLINIC | Age: 55
End: 2020-08-04

## 2020-08-04 NOTE — TELEPHONE ENCOUNTER
Ele Burnett is not in office this week. I am not sure what she is asking? She is on both Ozempic and Metformin. Neither are new medications. I do see Dayana GALLAGHER recently changed the dose of patient's Ozempic. Our office has no idea the cost of the medication so I am not sure why insurance is telling her it is our responsibility? Please clarify if she cannot afford Ozempic? Does she need different medication?

## 2020-08-04 NOTE — TELEPHONE ENCOUNTER
Patient called stating she spoke with insurance and they said they needed a pre-determination letter and a different medication that was prescribed for diabetes since she was not able to pay remaining balance. Apparently the insurance company told her its providers job to pick a medication and let her (patient)  know how much she is going to have to pay out of pocket after what they cover. I explained to her that is not the case, however that is all they told her so she doesn't know what to do. Please advise.

## 2020-08-04 NOTE — TELEPHONE ENCOUNTER
Patient was unable to afford Ozempic at $240 monthly. She was instructed to contact insurance for formulary alternatives. Writer did contact insurance and they stated [facility] was not a preferred pharmacy. If she took savings/assistance card and script was sent to a local [facility] pharmacy it would be cheaper out of pocket. Per her insurance any brand diabetic injectable would be a max of $200 copay ie victoza. Per patient request medication was transferred to [facility].

## 2020-09-14 ENCOUNTER — OFFICE VISIT (OUTPATIENT)
Dept: FAMILY MEDICINE CLINIC | Age: 55
End: 2020-09-14
Payer: COMMERCIAL

## 2020-09-14 VITALS
WEIGHT: 153 LBS | DIASTOLIC BLOOD PRESSURE: 84 MMHG | BODY MASS INDEX: 30.04 KG/M2 | HEART RATE: 88 BPM | SYSTOLIC BLOOD PRESSURE: 145 MMHG | TEMPERATURE: 97.3 F | HEIGHT: 60 IN | OXYGEN SATURATION: 100 %

## 2020-09-14 PROBLEM — Z72.0 TOBACCO USE: Chronic | Status: ACTIVE | Noted: 2020-09-14

## 2020-09-14 PROCEDURE — G8427 DOCREV CUR MEDS BY ELIG CLIN: HCPCS | Performed by: FAMILY MEDICINE

## 2020-09-14 PROCEDURE — 3017F COLORECTAL CA SCREEN DOC REV: CPT | Performed by: FAMILY MEDICINE

## 2020-09-14 PROCEDURE — 3046F HEMOGLOBIN A1C LEVEL >9.0%: CPT | Performed by: FAMILY MEDICINE

## 2020-09-14 PROCEDURE — 4004F PT TOBACCO SCREEN RCVD TLK: CPT | Performed by: FAMILY MEDICINE

## 2020-09-14 PROCEDURE — G8417 CALC BMI ABV UP PARAM F/U: HCPCS | Performed by: FAMILY MEDICINE

## 2020-09-14 PROCEDURE — 2022F DILAT RTA XM EVC RTNOPTHY: CPT | Performed by: FAMILY MEDICINE

## 2020-09-14 PROCEDURE — 99203 OFFICE O/P NEW LOW 30 MIN: CPT | Performed by: FAMILY MEDICINE

## 2020-09-14 RX ORDER — SEMAGLUTIDE 1.34 MG/ML
INJECTION, SOLUTION SUBCUTANEOUS
COMMUNITY
Start: 2020-08-21 | End: 2020-10-12 | Stop reason: SINTOL

## 2020-09-14 SDOH — ECONOMIC STABILITY: TRANSPORTATION INSECURITY
IN THE PAST 12 MONTHS, HAS LACK OF TRANSPORTATION KEPT YOU FROM MEETINGS, WORK, OR FROM GETTING THINGS NEEDED FOR DAILY LIVING?: PATIENT DECLINED

## 2020-09-14 SDOH — ECONOMIC STABILITY: FOOD INSECURITY: WITHIN THE PAST 12 MONTHS, YOU WORRIED THAT YOUR FOOD WOULD RUN OUT BEFORE YOU GOT MONEY TO BUY MORE.: PATIENT DECLINED

## 2020-09-14 SDOH — ECONOMIC STABILITY: INCOME INSECURITY: HOW HARD IS IT FOR YOU TO PAY FOR THE VERY BASICS LIKE FOOD, HOUSING, MEDICAL CARE, AND HEATING?: PATIENT DECLINED

## 2020-09-14 SDOH — ECONOMIC STABILITY: FOOD INSECURITY: WITHIN THE PAST 12 MONTHS, THE FOOD YOU BOUGHT JUST DIDN'T LAST AND YOU DIDN'T HAVE MONEY TO GET MORE.: PATIENT DECLINED

## 2020-09-14 SDOH — ECONOMIC STABILITY: TRANSPORTATION INSECURITY
IN THE PAST 12 MONTHS, HAS THE LACK OF TRANSPORTATION KEPT YOU FROM MEDICAL APPOINTMENTS OR FROM GETTING MEDICATIONS?: PATIENT DECLINED

## 2020-09-14 ASSESSMENT — ENCOUNTER SYMPTOMS
EYES NEGATIVE: 1
RESPIRATORY NEGATIVE: 1
GASTROINTESTINAL NEGATIVE: 1

## 2020-09-14 NOTE — PROGRESS NOTES
[address] PRIMARY CARE  [address] [place] [address] [address] [place] [address]  Dept: [phone]  Dept Fax: [phone]    Tammy Verduzco is a 54 y.o. female who presents today for her medical conditions/complaints as noted below. Tammy Verduzco is c/o of   Chief Complaint   Patient presents with   [address]    Diabetes     type 2 last A1c  10 in 7/2020    Back Pain         HPI:     The patient is here for new patient appointment. The patient has a history of significant back problems including DDD s/p c3-6 fusion x 2. Has facet syndrome, OA, bulging disc in thoracic spine. The patient has pain almost constantly. The patient flared up the thoracic pain yesterday where the pain was in her mid back that would go down her arms. The patient also has pain in her lumbar spine that goes down her right leg. The patient is currently following with Dr. Ray Garza. She also follows with Dr. Ophelia Jimenez for pain management. The patient has been a love since she graduated high school and notes significant pain and debility due to standing chronically. The patient LMP was over 1 year ago. Diabetes   She presents for her follow-up diabetic visit. She has type 2 diabetes mellitus. The initial diagnosis of diabetes was made 6 years ago. Her disease course has been stable. There are no hypoglycemic associated symptoms. There are no diabetic associated symptoms. There are no hypoglycemic complications. Symptoms are stable. There are no diabetic complications. Risk factors for coronary artery disease include diabetes mellitus, dyslipidemia, hypertension, obesity, post-menopausal and sedentary lifestyle. Current diabetic treatment includes oral agent (dual therapy). She is compliant with treatment all of the time. Her weight is stable. She is following a generally healthy diet. She rarely participates in exercise. An ACE inhibitor/angiotensin II receptor blocker is being taken.  She does not see a podiatrist.Eye exam is current ([facility]). Hemoglobin A1C (%)   Date Value   2020 10.1   07/10/2019 8.0   2018 7.5             ( goal A1Cis < 7)   Microalb/Crt.  Ratio (mcg/mg creat)   Date Value   2018 CANNOT BE CALCULATED     LDL Calculated (mg/dL)   Date Value   2020 77   07/10/2019 87   2018 82       (goal LDL is <100)   AST (U/L)   Date Value   2020 27     ALT (U/L)   Date Value   2020 44     BUN (mg/dL)   Date Value   2020 16     BP Readings from Last 3 Encounters:   20 (!) 145/84   20 128/62   19 124/72          (goal 120/80)    Past Medical History:   Diagnosis Date    Abnormal weight gain 2012    Back pain 2012    Cervical disc disease     Chronic back pain     Diabetes mellitus ([address]     Fibromyalgia     Hypertension     Spasm of muscle 2012      Past Surgical History:   Procedure Laterality Date    BREAST ENHANCEMENT SURGERY      CERVICAL [address] SURGERY  2014    ANTERIOR CERVICAL Brandi Li 6-7--saw  in [place]  then  was done at [address]      OTHER SURGICAL HISTORY  2018    fistulotomy repair, Dr Henri Canada         Family History   Problem Relation Age of Onset    Diabetes Mother     Heart Disease Mother         had CABG x 2    Other Mother         peripheral arterial disease    Diabetes Father     High Blood Pressure Father     Kidney Disease Father     Heart Failure Father     Stroke Brother         was 47years old    Diabetes type 2  Maternal Grandmother     Heart Disease Maternal Grandmother          at 80    Heart Attack Maternal Grandfather          in his 42's    Diabetes Paternal Grandmother     Osteoarthritis Paternal Grandfather     Breast Cancer Neg Hx     Uterine Cancer Neg Hx     Colon Cancer Neg Hx     Ovarian Cancer Neg Hx Social History     Tobacco Use    Smoking status: Light Tobacco Smoker     Packs/day: 1.00     Years: 30.00     Pack years: 30.00     Types: Cigarettes     Last attempt to quit: 2011     Years since quittin.8    Smokeless tobacco: Never Used    Tobacco comment: e-cig with lowest nicotine   Substance Use Topics    Alcohol use: No      Current Outpatient Medications   Medication Sig Dispense Refill    OZEMPIC, 1 MG/DOSE, 2 MG/1.5ML SOPN INJECT 1 MG UNDER THE SKIN ONCE WEEKLY      zoster recombinant adjuvanted vaccine (SHINGRIX) 50 MCG/0.5ML SUSR injection Inject 0.5 mLs into the muscle once for 1 dose 50 MCG IM then repeat 2-6 months. 1 each 1    hydroCHLOROthiazide (MICROZIDE) 12.5 MG capsule Take 1 capsule by mouth every morning 14 capsule 0    losartan (COZAAR) 50 MG tablet TAKE 1 TABLET DAILY 90 tablet 1    metFORMIN (GLUCOPHAGE-XR) 750 MG extended release tablet TAKE 1 TABLET TWICE DAILY  WITH MEALS 180 tablet 1    Coenzyme Q10 (COQ10) 200 MG CAPS Take 200 mg by mouth daily      varenicline (CHANTIX) 1 MG tablet Take 1 tablet by mouth 2 times daily 60 tablet 1    amitriptyline (ELAVIL) 25 MG tablet Take 2 tablets by mouth nightly 60 tablet 2    meloxicam (MOBIC) 7.5 MG tablet Take 7.5 mg by mouth every other day      blood glucose monitor strips Test 2 times a day & as needed for symptoms of irregular blood glucose.  100 strip 5    Lancets MISC Please dispense lancets covered per insurance 100 each 3    metaxalone (SKELAXIN) 800 MG tablet Take 1 tablet by mouth nightly as needed for Pain (muscle spasms) 30 tablet 0    atorvastatin (LIPITOR) 20 MG tablet Take 1 tablet by mouth daily (Patient taking differently: Take 10 mg by mouth daily ) 90 tablet 0    aspirin EC 81 MG EC tablet Take 1 tablet by mouth daily 30 tablet     vitamin C (ASCORBIC ACID) 500 MG tablet Take 500 mg by mouth daily      magnesium oxide (MAG-OX) 400 MG tablet Take 400 mg by mouth daily      Saccharomyces boulardii (PROBIOTIC) 250 MG CAPS Take 250 mg by mouth daily      Blood Glucose Monitoring Suppl (ONE TOUCH ULTRA 2) w/Device KIT USE TO CHECK BLOOD SUGAR AS DIRECTED  0    FLECTOR 1.3 % patch Place 1.3 patches onto the skin  0    Melatonin 1 MG Take 3 tablets by mouth daily as needed (sleep) 90 tablet 0    oxyCODONE-acetaminophen (PERCOCET) 5-325 MG per tablet Take 1 tablet by mouth 3 times daily as needed  0    Cholecalciferol (VITAMIN D) 2000 UNITS CAPS capsule Take 1 capsule by mouth daily.  Omega 3 1000 MG CAPS Take  by mouth daily. No current facility-administered medications for this visit. Allergies   Allergen Reactions    Amaryl [Glimepiride] Other (See Comments)     Low blood sugar    Cyclobenzaprine      Other reaction(s): sedation, constiatpion, dry mouth    Duloxetine Hcl      Other reaction(s): weight gain    Farxiga [Dapagliflozin] Other (See Comments)     Yeast infection    Penicillins        Health Maintenance   Topic Date Due    Hepatitis B vaccine (1 of 3 - Risk 3-dose series) 03/30/1984    Diabetic retinal exam  09/23/2011    Breast cancer screen  03/30/2015    Shingles Vaccine (1 of 2) 03/30/2015    Colon Cancer Screen FIT/FOBT  04/28/2018    Diabetic foot exam  11/07/2019    Diabetic microalbuminuria test  11/07/2019    Low dose CT lung screening  03/30/2020    Pneumococcal 0-64 years Vaccine (1 of 1 - PPSV23) 10/29/2020 (Originally 3/30/1971)    Flu vaccine (1) 09/14/2021 (Originally 9/1/2020)    A1C test (Diabetic or Prediabetic)  10/20/2020    Lipid screen  07/20/2021    Potassium monitoring  07/20/2021    Creatinine monitoring  07/20/2021    Cervical cancer screen  09/04/2024    DTaP/Tdap/Td vaccine (2 - Td) 08/28/2025    Hepatitis C screen  Completed    HIV screen  Completed    Hepatitis A vaccine  Aged Out    Hib vaccine  Aged Out    Meningococcal (ACWY) vaccine  Aged Out       Subjective:     Review of Systems   Constitutional: Negative. HENT: Negative. Eyes: Negative. Respiratory: Negative. Cardiovascular: Negative. Gastrointestinal: Negative. Genitourinary: Negative. Patient was noticing increased vaginal itching and irritation when her blood sugars were elevated   Musculoskeletal: Negative. Skin: Negative. Allergic/Immunologic: Negative for environmental allergies, food allergies and immunocompromised state. Neurological: Negative. Psychiatric/Behavioral: Negative. Objective:     Physical Exam  Vitals signs and nursing note reviewed. Constitutional:       General: She is awake. She is not in acute distress. Appearance: Normal appearance. She is not toxic-appearing. HENT:      Head: Normocephalic and atraumatic. Right Ear: External ear normal.      Left Ear: External ear normal.      Nose: Nose normal.      Mouth/Throat:      Mouth: Mucous membranes are moist.   Eyes:      Extraocular Movements: Extraocular movements intact. Conjunctiva/sclera: Conjunctivae normal.   Neck:      Musculoskeletal: Normal range of motion and neck supple. Cardiovascular:      Rate and Rhythm: Normal rate and regular rhythm. Pulses: Normal pulses. Heart sounds: Normal heart sounds. No murmur. Pulmonary:      Effort: Pulmonary effort is normal.      Breath sounds: Normal breath sounds. Abdominal:      General: Abdomen is flat. Bowel sounds are normal.      Palpations: Abdomen is soft. Musculoskeletal: Normal range of motion. Skin:     Capillary Refill: Capillary refill takes less than 2 seconds. Neurological:      General: No focal deficit present. Mental Status: She is alert and oriented to person, place, and time. Cranial Nerves: No cranial nerve deficit. Motor: No weakness.       Gait: Gait normal.   Psychiatric:         Attention and Perception: Attention normal.         Mood and Affect: Mood and affect normal.         Speech: Speech normal.         Behavior: Behavior normal.         Thought Content: Thought content normal.         Judgment: Judgment normal.       BP (!) 145/84   Pulse 88   Temp 97.3 °F (36.3 °C) (Skin)   Ht 5' (1.524 m)   Wt 153 lb (69.4 kg)   SpO2 100%   BMI 29.88 kg/m²     Assessment:       Diagnosis Orders   1. Type 2 diabetes mellitus without complication, without long-term current use of insulin (HCC)  Microalbumin, Ur    Hemoglobin A1C   2. DDD (degenerative disc disease), cervical  External Referral To Physical Therapy   3. Displacement of lumbar intervertebral disc without myelopathy  External Referral To Physical Therapy   4. Essential hypertension     5. Mild obstructive sleep apnea     6. Primary insomnia     7. Encounter for screening mammogram for breast cancer  MAURY DIGITAL SCREEN W OR WO CAD BILATERAL   8. Need for prophylactic vaccination and inoculation against varicella  zoster recombinant adjuvanted vaccine [facility]) 50 MCG/0.5ML SUSR injection             Plan:    DM-2 - patient to check sugars once a day and as needed for symptoms of abnormal glucose. Patient on ozempic and metformin. Also on arb. Eye examination up to date. Will do foot examination next visit. Patient encouraged to make healthy food choices. DDD/Chronic back pain - patient following with Dr. Rigo Marley and Dr. Karl Castillo. Is on mobic, percocet, metaxolone    HTN - patient bp elevated today. Patient does believe it is due to pain. Will have her continue current medications and check her blood pressure at home. She is to call in the readings in 2-3 days. If persistently elevated will adjust bp medications. Is on HCTZ, losartan    Tobacco use - patient to use chantix as discussed    NATTY - patient to use her treatment as directed    Insomnia - is on elavil    Anxiety - is on elavil    Hyperlipid - on atorvastatin      Return in about 3 months (around 12/14/2020) for diabetes.     Orders Placed This Encounter   Procedures    MAURY DIGITAL SCREEN W OR WO CAD BILATERAL

## 2020-09-17 RX ORDER — HYDROCHLOROTHIAZIDE 25 MG/1
25 TABLET ORAL EVERY MORNING
Qty: 90 TABLET | Refills: 1 | Status: SHIPPED | OUTPATIENT
Start: 2020-09-17 | End: 2021-01-25 | Stop reason: SDUPTHER

## 2020-09-26 ENCOUNTER — PATIENT MESSAGE (OUTPATIENT)
Dept: FAMILY MEDICINE CLINIC | Age: 55
End: 2020-09-26

## 2020-09-27 NOTE — TELEPHONE ENCOUNTER
From: Francine Greene  To: Susan Sarmiento MD  Sent: 9/26/2020 8:56 AM EDT  Subject: Prescription Question    Hi, I just realized I only have 5 days worth of metformin er 750 mg left. I will need a partial refill called into [facility] in [place], and 3 month submitted to [facility], as the mail has been extremely slow.  Thank you,  Luís Booker

## 2020-09-28 ENCOUNTER — TELEMEDICINE (OUTPATIENT)
Dept: PSYCHOLOGY | Age: 55
End: 2020-09-28
Payer: COMMERCIAL

## 2020-09-28 PROCEDURE — 4004F PT TOBACCO SCREEN RCVD TLK: CPT | Performed by: PSYCHOLOGIST

## 2020-09-28 PROCEDURE — 90791 PSYCH DIAGNOSTIC EVALUATION: CPT | Performed by: PSYCHOLOGIST

## 2020-09-28 RX ORDER — METFORMIN HYDROCHLORIDE 750 MG/1
750 TABLET, EXTENDED RELEASE ORAL 2 TIMES DAILY WITH MEALS
Qty: 60 TABLET | Refills: 0 | Status: SHIPPED | OUTPATIENT
Start: 2020-09-28 | End: 2021-04-01 | Stop reason: SDUPTHER

## 2020-09-28 RX ORDER — METFORMIN HYDROCHLORIDE 750 MG/1
750 TABLET, EXTENDED RELEASE ORAL 2 TIMES DAILY WITH MEALS
Qty: 60 TABLET | Refills: 0 | OUTPATIENT
Start: 2020-09-28 | End: 2021-09-28

## 2020-09-28 RX ORDER — METFORMIN HYDROCHLORIDE 750 MG/1
750 TABLET, EXTENDED RELEASE ORAL 2 TIMES DAILY WITH MEALS
Qty: 180 TABLET | Refills: 1 | Status: SHIPPED | OUTPATIENT
Start: 2020-09-28 | End: 2020-09-28 | Stop reason: SDUPTHER

## 2020-09-28 ASSESSMENT — ANXIETY QUESTIONNAIRES
4. TROUBLE RELAXING: 3-NEARLY EVERY DAY
2. NOT BEING ABLE TO STOP OR CONTROL WORRYING: 0-NOT AT ALL
5. BEING SO RESTLESS THAT IT IS HARD TO SIT STILL: 1-SEVERAL DAYS
7. FEELING AFRAID AS IF SOMETHING AWFUL MIGHT HAPPEN: 0-NOT AT ALL
1. FEELING NERVOUS, ANXIOUS, OR ON EDGE: 3-NEARLY EVERY DAY
6. BECOMING EASILY ANNOYED OR IRRITABLE: 1-SEVERAL DAYS
GAD7 TOTAL SCORE: 8
3. WORRYING TOO MUCH ABOUT DIFFERENT THINGS: 0-NOT AT ALL

## 2020-09-28 ASSESSMENT — PATIENT HEALTH QUESTIONNAIRE - PHQ9
SUM OF ALL RESPONSES TO PHQ QUESTIONS 1-9: 11
5. POOR APPETITE OR OVEREATING: 0
1. LITTLE INTEREST OR PLEASURE IN DOING THINGS: 1
6. FEELING BAD ABOUT YOURSELF - OR THAT YOU ARE A FAILURE OR HAVE LET YOURSELF OR YOUR FAMILY DOWN: 2
9. THOUGHTS THAT YOU WOULD BE BETTER OFF DEAD, OR OF HURTING YOURSELF: 0
2. FEELING DOWN, DEPRESSED OR HOPELESS: 3
7. TROUBLE CONCENTRATING ON THINGS, SUCH AS READING THE NEWSPAPER OR WATCHING TELEVISION: 2
8. MOVING OR SPEAKING SO SLOWLY THAT OTHER PEOPLE COULD HAVE NOTICED. OR THE OPPOSITE, BEING SO FIGETY OR RESTLESS THAT YOU HAVE BEEN MOVING AROUND A LOT MORE THAN USUAL: 1
SUM OF ALL RESPONSES TO PHQ9 QUESTIONS 1 & 2: 4
4. FEELING TIRED OR HAVING LITTLE ENERGY: 0
3. TROUBLE FALLING OR STAYING ASLEEP: 2
SUM OF ALL RESPONSES TO PHQ QUESTIONS 1-9: 11
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
## 2020-10-05 ENCOUNTER — TELEMEDICINE (OUTPATIENT)
Dept: PSYCHOLOGY | Age: 55
End: 2020-10-05
Payer: COMMERCIAL

## 2020-10-05 PROCEDURE — 90834 PSYTX W PT 45 MINUTES: CPT | Performed by: PSYCHOLOGIST

## 2020-10-05 PROCEDURE — 4004F PT TOBACCO SCREEN RCVD TLK: CPT | Performed by: PSYCHOLOGIST

## 2020-10-05 NOTE — PROGRESS NOTES
Davi Rollins M.A. Psychology Doctoral Trainee    Supervising Clinical Psychologists:  Claude Norrie, Ph.D. Zion Flowers,  Ph.D. Kathryn Kitchen        Visit Date: 10/5/2020   Time of appointment: 9:11AM - 9:59AM   Time spent with Patient: 48 minutes. This is patient's second appointment. Reason for Consult:  Depression and Anxiety     Referring Provider/PCP:    No ref. provider found  Celestine Henderson MD      Pt provided informed consent for the behavioral health program. Discussed with patient model of service to include the limits of confidentiality (i.e. abuse reporting, suicide intervention, etc.) and short-term intervention focused approach. Pt indicated understanding. Pt provided verbal consent to engage in telehealth psychotherapy. This visit was completed virtually using "[employer] video due to contact restrictions related to the COVID-19 pandemic. There were some issues with audio/video lag during the call. Session was held in a private place (pt's home). Because of this certain behavioral observations were not assessed during this appointment. The call took about 3 minutes to properly begin due to technical difficulties with "[employer] call.      Pt provided verbal consent for the following emergency contact:   her spouse Bryson Yoon at [phone].     [facility]/Clinician location: [facility] in [place], [place]. [place] Park is a 54 y.o. female who presents for follow up of anxiety, depression, and stress. Current symptoms include low mood, excessive guilt, low self-esteem, worry, and nervousness. Session activities included discussion and processing of lack of social support and family relationship issues, and identification of treatment goals.     Pt shared that after the intake last week, she realized how little support she has and that she had been \"shutting everyone out of her life\" due to her relationship and family

## 2020-10-12 ENCOUNTER — OFFICE VISIT (OUTPATIENT)
Dept: FAMILY MEDICINE CLINIC | Age: 55
End: 2020-10-12
Payer: COMMERCIAL

## 2020-10-12 VITALS
WEIGHT: 152.4 LBS | BODY MASS INDEX: 29.92 KG/M2 | TEMPERATURE: 97.5 F | DIASTOLIC BLOOD PRESSURE: 88 MMHG | HEIGHT: 60 IN | HEART RATE: 99 BPM | OXYGEN SATURATION: 96 % | SYSTOLIC BLOOD PRESSURE: 168 MMHG

## 2020-10-12 PROCEDURE — 2022F DILAT RTA XM EVC RTNOPTHY: CPT | Performed by: FAMILY MEDICINE

## 2020-10-12 PROCEDURE — 99214 OFFICE O/P EST MOD 30 MIN: CPT | Performed by: FAMILY MEDICINE

## 2020-10-12 PROCEDURE — G8427 DOCREV CUR MEDS BY ELIG CLIN: HCPCS | Performed by: FAMILY MEDICINE

## 2020-10-12 PROCEDURE — G8484 FLU IMMUNIZE NO ADMIN: HCPCS | Performed by: FAMILY MEDICINE

## 2020-10-12 PROCEDURE — 4004F PT TOBACCO SCREEN RCVD TLK: CPT | Performed by: FAMILY MEDICINE

## 2020-10-12 PROCEDURE — 3017F COLORECTAL CA SCREEN DOC REV: CPT | Performed by: FAMILY MEDICINE

## 2020-10-12 PROCEDURE — 3046F HEMOGLOBIN A1C LEVEL >9.0%: CPT | Performed by: FAMILY MEDICINE

## 2020-10-12 PROCEDURE — G8417 CALC BMI ABV UP PARAM F/U: HCPCS | Performed by: FAMILY MEDICINE

## 2020-10-12 RX ORDER — LORAZEPAM 0.5 MG/1
0.5 TABLET ORAL DAILY PRN
Qty: 10 TABLET | Refills: 0 | Status: SHIPPED | OUTPATIENT
Start: 2020-10-12 | End: 2020-11-11

## 2020-10-12 RX ORDER — LOSARTAN POTASSIUM 100 MG/1
100 TABLET ORAL DAILY
Qty: 90 TABLET | Refills: 1 | Status: SHIPPED | OUTPATIENT
Start: 2020-10-12 | End: 2021-04-28

## 2020-10-12 RX ORDER — METAXALONE 800 MG/1
TABLET ORAL
COMMUNITY
Start: 2020-10-01

## 2020-10-12 RX ORDER — FLUOXETINE 10 MG/1
10 CAPSULE ORAL DAILY
Qty: 30 CAPSULE | Refills: 3 | Status: SHIPPED | OUTPATIENT
Start: 2020-10-12 | End: 2020-11-25 | Stop reason: SDUPTHER

## 2020-10-12 RX ORDER — HYDROCHLOROTHIAZIDE 12.5 MG/1
CAPSULE, GELATIN COATED ORAL
COMMUNITY
Start: 2020-09-28 | End: 2020-10-12

## 2020-10-12 ASSESSMENT — ENCOUNTER SYMPTOMS
RESPIRATORY NEGATIVE: 1
NAUSEA: 1
EYES NEGATIVE: 1
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
## 2020-10-19 ENCOUNTER — TELEPHONE (OUTPATIENT)
Dept: PSYCHOLOGY | Age: 55
End: 2020-10-19

## 2020-10-19 NOTE — TELEPHONE ENCOUNTER
Pt cancelled 10/12 appt due to not feeling well. Called pt to offer to reschedule, left vm including clinician's [employer] voice number and primary care practice number.

## 2020-10-27 LAB
AVERAGE GLUCOSE: NORMAL
HBA1C MFR BLD: 7.8 %

## 2020-11-02 ENCOUNTER — TELEPHONE (OUTPATIENT)
Dept: PSYCHOLOGY | Age: 55
End: 2020-11-02

## 2020-11-02 NOTE — TELEPHONE ENCOUNTER
Called pt after cancelled appt to offer a future appt; left voicemail with clinician's [employer] voice number and Primary Care Clinic number.

## 2020-11-05 ENCOUNTER — TELEMEDICINE (OUTPATIENT)
Dept: FAMILY MEDICINE CLINIC | Age: 55
End: 2020-11-05
Payer: COMMERCIAL

## 2020-11-05 PROCEDURE — 99213 OFFICE O/P EST LOW 20 MIN: CPT | Performed by: FAMILY MEDICINE

## 2020-11-05 PROCEDURE — 3051F HG A1C>EQUAL 7.0%<8.0%: CPT | Performed by: FAMILY MEDICINE

## 2020-11-05 PROCEDURE — G8428 CUR MEDS NOT DOCUMENT: HCPCS | Performed by: FAMILY MEDICINE

## 2020-11-05 PROCEDURE — 2022F DILAT RTA XM EVC RTNOPTHY: CPT | Performed by: FAMILY MEDICINE

## 2020-11-05 PROCEDURE — 3017F COLORECTAL CA SCREEN DOC REV: CPT | Performed by: FAMILY MEDICINE

## 2020-11-05 RX ORDER — SEMAGLUTIDE 1.34 MG/ML
1 INJECTION, SOLUTION SUBCUTANEOUS WEEKLY
Qty: 12 PEN | Refills: 0 | Status: SHIPPED | OUTPATIENT
Start: 2020-11-05 | End: 2020-11-11 | Stop reason: SDUPTHER

## 2020-11-05 ASSESSMENT — ENCOUNTER SYMPTOMS
GASTROINTESTINAL NEGATIVE: 1
RESPIRATORY NEGATIVE: 1
EYES NEGATIVE: 1

## 2020-11-05 NOTE — PROGRESS NOTES
2020    TELEHEALTH EVALUATION -- Audio/Visual (During WZCNN-08 public health emergency)    HPI:    Louise Duane (:  1965) has requested an audio/video evaluation for the following concern(s):    The patient has been taking her blood sugars intermittently. The last blood sugar she checked was 245 and that was a fasting blood sugar. The patient states her mood has been improving. She states she believes some of her anxiety was due to her history of taking the chantix regularly. The patient has been taking her prozac and that seems to helping with her mood. The patient was checking her blood pressures at home and her blood pressure 715-303'O systolic to 27'M. She is taking farxiga, ozempic and metformin XR. Review of Systems   Constitutional: Negative. HENT: Negative. Eyes: Negative. Respiratory: Negative. Cardiovascular: Negative. Gastrointestinal: Negative. Genitourinary: Negative. Musculoskeletal: Negative. Skin: Negative. Allergic/Immunologic: Negative for environmental allergies, food allergies and immunocompromised state. Neurological: Negative. Psychiatric/Behavioral: Negative. Prior to Visit Medications    Medication Sig Taking? Authorizing Provider   Semaglutide,0.25 or 0.5MG/DOS, (OZEMPIC, 0.25 OR 0.5 MG/DOSE,) 2 MG/1.5ML SOPN Inject 1 mg into the skin once a week Yes Arlyn Jones MD   metaxalone (SKELAXIN) 800 MG tablet take 1 tablet by mouth twice a day  Historical Provider, MD   FLUoxetine (PROZAC) 10 MG capsule Take 1 capsule by mouth daily  Arlyn Jones MD   LORazepam (ATIVAN) 0.5 MG tablet Take 1 tablet by mouth daily as needed for Anxiety for up to 30 days.   Arlyn Jones MD   dapagliflozin (FARXIGA) 5 MG tablet Take 1 tablet by mouth every morning  Arlyn Jones MD   losartan (COZAAR) 100 MG tablet Take 1 tablet by mouth daily  Arlyn Jones MD   metFORMIN (GLUCOPHAGE-XR) 750 MG extended release tablet Take 1 tablet by mouth 2 times daily (with meals)  Crystal Elias MD   hydroCHLOROthiazide (HYDRODIURIL) 25 MG tablet Take 1 tablet by mouth every morning  Crystal Elias MD   losartan (COZAAR) 50 MG tablet TAKE 1 TABLET DAILY  PB Edouard CNP   Coenzyme Q10 (COQ10) 200 MG CAPS Take 200 mg by mouth daily  Historical Provider, MD   amitriptyline (ELAVIL) 25 MG tablet Take 2 tablets by mouth nightly  PB Edouard CNP   meloxicam (MOBIC) 7.5 MG tablet Take 7.5 mg by mouth every other day  Historical Provider, MD   blood glucose monitor strips Test 2 times a day & as needed for symptoms of irregular blood glucose. PB Edouard CNP   Lancets MISC Please dispense lancets covered per insurance  PB Edouard CNP   atorvastatin (LIPITOR) 20 MG tablet Take 1 tablet by mouth daily  Patient taking differently: Take 10 mg by mouth daily   PB Gloria CNP   aspirin EC 81 MG EC tablet Take 1 tablet by mouth daily  PB Gloria CNP   vitamin C (ASCORBIC ACID) 500 MG tablet Take 500 mg by mouth daily  Historical Provider, MD   magnesium oxide (MAG-OX) 400 MG tablet Take 400 mg by mouth daily  Historical Provider, MD   Saccharomyces boulardii (PROBIOTIC) 250 MG CAPS Take 250 mg by mouth daily  Historical Provider, MD   Blood Glucose Monitoring Suppl (ONE TOUCH ULTRA 2) w/Device KIT USE TO CHECK BLOOD SUGAR AS DIRECTED  Historical Provider, MD   Melatonin 1 MG Take 3 tablets by mouth daily as needed (sleep)  PB Quintero CNP   oxyCODONE-acetaminophen (PERCOCET) 5-325 MG per tablet Take 1 tablet by mouth 3 times daily as needed  Lyndsey Matute   Cholecalciferol (VITAMIN D) 2000 UNITS CAPS capsule Take 1 capsule by mouth daily. Historical Provider, MD   Omega 3 1000 MG CAPS Take  by mouth daily.   Historical Provider, MD       Social History     Tobacco Use    Smoking status: Light Tobacco Smoker     Packs/day: 1.00     Years: 30.00     Pack years: 30.00     Types: Cigarettes     Last attempt to quit: 2011     Years since quittin.9    Smokeless tobacco: Never Used    Tobacco comment: e-cig with lowest nicotine   Substance Use Topics    Alcohol use: No    Drug use: No        Allergies   Allergen Reactions    Amaryl [Glimepiride] Other (See Comments)     Low blood sugar    Cyclobenzaprine      Other reaction(s): sedation, constiatpion, dry mouth    Duloxetine Hcl      Other reaction(s): weight gain   Liset Hinklealvaro.Galindo [Dapagliflozin] Other (See Comments)     Yeast infection    Penicillins    ,   Past Medical History:   Diagnosis Date    Abnormal weight gain 2012    Back pain 2012    Cervical disc disease     Chronic back pain     Diabetes mellitus ([facility] [address]     Fibromyalgia     Hypertension     Labile blood pressure 3/22/2014    Spasm of muscle 2012    Tachycardia 3/21/2014       PHYSICAL EXAMINATION:  [ INSTRUCTIONS:  \"[x]\" Indicates a positive item  \"[]\" Indicates a negative item  -- DELETE ALL ITEMS NOT EXAMINED]  Vital Signs: (As obtained by patient/caregiver or practitioner observation)    Blood pressure-  Heart rate-    Respiratory rate-    Temperature-  Pulse oximetry-     Constitutional: [x] Appears well-developed and well-nourished [x] No apparent distress      [] Abnormal-   Mental status  [x] Alert and awake  [x] Oriented to person/place/time [x]Able to follow commands      Eyes:  EOM    [x]  Normal  [] Abnormal-  Sclera  [x]  Normal  [] Abnormal -         Discharge [x]  None visible  [] Abnormal -    HENT:   [x] Normocephalic, atraumatic.   [] Abnormal   [x] Mouth/Throat: Mucous membranes are moist.     External Ears [x] Normal  [] Abnormal-     Neck: [x] No visualized mass     Pulmonary/Chest: [x] Respiratory effort normal.  [x] No visualized signs of difficulty breathing or respiratory distress        [] Abnormal-      Musculoskeletal:   [] Normal gait with no signs of ataxia         [x] Normal range of motion of neck        [] Abnormal- Neurological:        [x] No Facial Asymmetry (Cranial nerve 7 motor function) (limited exam to video visit)          [x] No gaze palsy        [] Abnormal-         Skin:        [] No significant exanthematous lesions or discoloration noted on facial skin         [] Abnormal-            Psychiatric:       [x] Normal Affect [x] No Hallucinations        [] Abnormal-     Other pertinent observable physical exam findings-     ASSESSMENT/PLAN:  1. Type 2 diabetes mellitus without complication, without long-term current use of insulin (HCC)  ozempic refilled. Patient to check blood sugars BID and if needed for symptoms of abnormal blood sugars. Patient to follow up in January   - Semaglutide,0.25 or 0.5MG/DOS, (OZEMPIC, 0.25 OR 0.5 MG/DOSE,) 2 MG/1.5ML SOPN; Inject 1 mg into the skin once a week  Dispense: 12 pen; Refill: 0    2. Essential hypertension  Encouraged patient to check blood pressures and bring readings into her next appointment     3. DAV (generalized anxiety disorder)  Patient stable on prozac. Will continue on current dose. Return in about 3 months (around 2/5/2021) for diabetes. Jimbo Teran is a 54 y.o. female being evaluated by a Virtual Visit (video visit) encounter to address concerns as mentioned above. A caregiver was present when appropriate. Due to this being a TeleHealth encounter (During [place] [address] public health emergency), evaluation of the following organ systems was limited: Vitals/Constitutional/EENT/Resp/CV/GI//MS/Neuro/Skin/Heme-Lymph-Imm. Pursuant to the emergency declaration under the [address] [place] [address], [address] [place] [address] authority and the [employer] Act, this Virtual Visit was conducted with patient's (and/or legal guardian's) consent, to reduce the patient's risk of exposure to COVID-19 and provide necessary medical care.   The patient (and/or legal guardian) has also been advised to contact this office for worsening conditions or problems, and seek emergency medical treatment and/or call 911 if deemed necessary. Patient identification was verified at the start of the visit: Yes    Total time spent on this encounter: 15 minutes    Services were provided through a video synchronous discussion virtually to substitute for in-person clinic visit. Patient and provider were located at their individual homes. --Crystal Elias MD on 11/5/2020 at 3:12 PM    An electronic signature was used to authenticate this note.

## 2020-11-09 ENCOUNTER — TELEPHONE (OUTPATIENT)
Dept: FAMILY MEDICINE CLINIC | Age: 55
End: 2020-11-09

## 2020-11-09 NOTE — TELEPHONE ENCOUNTER
[facility] pharmacy calling office needing clarification on pt medication and sig for Ozempic. Medication that was sent over dose not match sig. Please advise.       Number to contact pharmacy is [phone] with Ref# [id number]

## 2020-11-11 RX ORDER — SEMAGLUTIDE 1.34 MG/ML
1 INJECTION, SOLUTION SUBCUTANEOUS WEEKLY
Qty: 6 PEN | Refills: 0 | Status: SHIPPED | OUTPATIENT
Start: 2020-11-11 | End: 2020-11-13 | Stop reason: SDUPTHER

## 2020-11-13 ENCOUNTER — TELEPHONE (OUTPATIENT)
Dept: FAMILY MEDICINE CLINIC | Age: 55
End: 2020-11-13

## 2020-11-13 RX ORDER — SEMAGLUTIDE 1.34 MG/ML
0.5 INJECTION, SOLUTION SUBCUTANEOUS WEEKLY
Qty: 6 PEN | Refills: 0 | Status: SHIPPED | OUTPATIENT
Start: 2020-11-13 | End: 2020-12-16 | Stop reason: DRUGHIGH

## 2020-11-13 NOTE — TELEPHONE ENCOUNTER
Chaz Elizondo from [address] [place] [address] called in stating the sig does not match the dose you can call back to get this process fixed quicker so patient can get her meds at   [phone] ref # [id number]    please advise thank you!

## 2020-11-13 NOTE — TELEPHONE ENCOUNTER
I sent it in with 0.5mg dose although the Max dose is supposed to be 1mg.  Please notify the patient the pharmacy is saying she cant do 1 mg dose and to go yo 0.5mg dose weekly

## 2020-11-19 NOTE — TELEPHONE ENCOUNTER
I spoke with the patient and she has been taking 1 mg for the last 2 months so should she just continue that and also when you perscribed prozac if she should have stopped taking the elevil but she did stop taking the elevil after taking them together for one week but she is still having a hard time sleeping she will sleep for a few hours and then wakes up please advise

## 2020-11-20 NOTE — TELEPHONE ENCOUNTER
Can you ask the patient if she was just using one shot or did she have to dial twice. The pharmacy is giving me a fit about the 1mg even though its appropriate dosing.

## 2020-11-25 ENCOUNTER — TELEMEDICINE (OUTPATIENT)
Dept: FAMILY MEDICINE CLINIC | Age: 55
End: 2020-11-25
Payer: COMMERCIAL

## 2020-11-25 PROCEDURE — G8427 DOCREV CUR MEDS BY ELIG CLIN: HCPCS | Performed by: FAMILY MEDICINE

## 2020-11-25 PROCEDURE — 3017F COLORECTAL CA SCREEN DOC REV: CPT | Performed by: FAMILY MEDICINE

## 2020-11-25 PROCEDURE — 99213 OFFICE O/P EST LOW 20 MIN: CPT | Performed by: FAMILY MEDICINE

## 2020-11-25 RX ORDER — FLUOXETINE HYDROCHLORIDE 20 MG/1
20 CAPSULE ORAL DAILY
Qty: 30 CAPSULE | Refills: 0 | Status: SHIPPED | OUTPATIENT
Start: 2020-11-25 | End: 2021-01-29 | Stop reason: SDUPTHER

## 2020-11-25 ASSESSMENT — ENCOUNTER SYMPTOMS
EYES NEGATIVE: 1
GASTROINTESTINAL NEGATIVE: 1
RESPIRATORY NEGATIVE: 1

## 2020-11-25 NOTE — PROGRESS NOTES
2020    TELEHEALTH EVALUATION -- Audio/Visual (During [facility]- public health emergency)    HPI:    Nelida Wade (:  1965) has requested an audio/video evaluation for the following concern(s):    The patient has a long history of insomnia and has been on and off of multiple medications in the past.  She has been trying melatonin and then she will only sleep for about 1 to 1 1/2 hours of sleep. The sleep is still getting broken up frequently. The patient states the insomnia has been increasing over the past month. The patient's anxiety has slightly improved but is unsure if it's due to situations. She still feels like she has nervous energy. The patient had recently stopped her elavil. Review of Systems   Constitutional: Negative. HENT: Negative. Eyes: Negative. Respiratory: Negative. Cardiovascular: Negative. Gastrointestinal: Negative. Genitourinary: Negative. Musculoskeletal: Negative. Skin: Negative. Allergic/Immunologic: Negative for environmental allergies, food allergies and immunocompromised state. Neurological: Negative. Psychiatric/Behavioral: Positive for sleep disturbance. Negative for dysphoric mood, hallucinations, self-injury and suicidal ideas. The patient is nervous/anxious. The patient is not hyperactive. Prior to Visit Medications    Medication Sig Taking? Authorizing Provider   Zinc Acetate, Oral, (ZINC ACETATE PO) Take by mouth Yes Historical Provider, MD   FLUoxetine (PROZAC) 20 MG capsule Take 1 capsule by mouth daily Yes Darylene Console, MD   Suvorexant 5 MG TABS Take 5 mg by mouth nightly as needed (insomnia) for up to 30 days.  Yes Darylene Console, MD   Semaglutide,0.25 or 0.5MG/DOS, (OZEMPIC, 0.25 OR 0.5 MG/DOSE,) 2 MG/1.5ML SOPN Inject 0.5 mg into the skin once a week Yes Darylene Console, MD   dapagliflozin (FARXIGA) 5 MG tablet Take 1 tablet by mouth every morning Yes Darylene Console, MD   metaxalone (SKELAXIN) 800 MG tablet take 1 tablet by mouth twice a day Yes Historical Provider, MD   losartan (COZAAR) 100 MG tablet Take 1 tablet by mouth daily Yes Gerald Warren MD   metFORMIN (GLUCOPHAGE-XR) 750 MG extended release tablet Take 1 tablet by mouth 2 times daily (with meals) Yes Gerald Warren MD   hydroCHLOROthiazide (HYDRODIURIL) 25 MG tablet Take 1 tablet by mouth every morning Yes Gerald Warren MD   Coenzyme Q10 (COQ10) 200 MG CAPS Take 200 mg by mouth daily Yes Historical Provider, MD   meloxicam (MOBIC) 7.5 MG tablet Take 7.5 mg by mouth every other day Yes Historical Provider, MD   blood glucose monitor strips Test 2 times a day & as needed for symptoms of irregular blood glucose. Yes PB Ley CNP   Lancets MISC Please dispense lancets covered per insurance Yes PB Ley CNP   atorvastatin (LIPITOR) 20 MG tablet Take 1 tablet by mouth daily  Patient taking differently: Take 10 mg by mouth daily  Yes PB Heath CNP   aspirin EC 81 MG EC tablet Take 1 tablet by mouth daily Yes PB Heath CNP   vitamin C (ASCORBIC ACID) 500 MG tablet Take 500 mg by mouth daily Yes Historical Provider, MD   magnesium oxide (MAG-OX) 400 MG tablet Take 400 mg by mouth daily Yes Historical Provider, MD   Saccharomyces boulardii (PROBIOTIC) 250 MG CAPS Take 250 mg by mouth daily Yes Historical Provider, MD   Blood Glucose Monitoring Suppl (ONE TOUCH ULTRA 2) w/Device KIT USE TO CHECK BLOOD SUGAR AS DIRECTED Yes Historical Provider, MD   Melatonin 1 MG Take 3 tablets by mouth daily as needed (sleep) Yes PB Cavanaugh CNP   oxyCODONE-acetaminophen (PERCOCET) 5-325 MG per tablet Take 1 tablet by mouth 3 times daily as needed Yes Rakesh Barakat   Cholecalciferol (VITAMIN D) 2000 UNITS CAPS capsule Take 1 capsule by mouth daily. Yes Historical Provider, MD   Omega 3 1000 MG CAPS Take  by mouth daily.  Yes Historical Provider, MD   losartan (COZAAR) 50 MG tablet TAKE 1 TABLET DAILY  Patient not taking: Reported on 2020  Dayana Ventura, APRN - CNP       Social History     Tobacco Use    Smoking status: Light Tobacco Smoker     Packs/day: 1.00     Years: 30.00     Pack years: 30.00     Types: Cigarettes     Last attempt to quit: 2011     Years since quittin.0    Smokeless tobacco: Never Used    Tobacco comment: e-cig with lowest nicotine   Substance Use Topics    Alcohol use: No    Drug use: No        Allergies   Allergen Reactions    Amaryl [Glimepiride] Other (See Comments)     Low blood sugar    Cyclobenzaprine      Other reaction(s): sedation, constiatpion, dry mouth    Duloxetine Hcl      Other reaction(s): weight gain    Farxiga [Dapagliflozin] Other (See Comments)     Yeast infection    Penicillins    ,   Past Medical History:   Diagnosis Date    Abnormal weight gain 2012    Back pain 2012    Cervical disc disease     Chronic back pain     Diabetes mellitus ([facility] [address]     Fibromyalgia     Hypertension     Labile blood pressure 3/22/2014    Spasm of muscle 2012    Tachycardia 3/21/2014       PHYSICAL EXAMINATION:  [ INSTRUCTIONS:  \"[x]\" Indicates a positive item  \"[]\" Indicates a negative item  -- DELETE ALL ITEMS NOT EXAMINED]  Vital Signs: (As obtained by patient/caregiver or practitioner observation)    Blood pressure-  Heart rate-    Respiratory rate-    Temperature-  Pulse oximetry-     Constitutional: [x] Appears well-developed and well-nourished [x] No apparent distress      [] Abnormal-   Mental status  [x] Alert and awake  [x] Oriented to person/place/time [x]Able to follow commands      Eyes:  EOM    [x]  Normal  [] Abnormal-  Sclera  [x]  Normal  [] Abnormal -         Discharge [x]  None visible  [] Abnormal -    HENT:   [x] Normocephalic, atraumatic.   [] Abnormal   [x] Mouth/Throat: Mucous membranes are moist.     External Ears [x] Normal  [] Abnormal-     Neck: [x] No visualized mass     Pulmonary/Chest: [x] Respiratory effort normal.  [x] No advised to contact this office for worsening conditions or problems, and seek emergency medical treatment and/or call 911 if deemed necessary. Patient identification was verified at the start of the visit: Yes    Total time spent on this encounter: 15 minutes    Services were provided through a video synchronous discussion virtually to substitute for in-person clinic visit. Patient and provider were located at their individual homes. --Lisa Hayden MD on 11/25/2020 at 2:38 PM    An electronic signature was used to authenticate this note.

## 2020-12-16 ENCOUNTER — HOSPITAL ENCOUNTER (OUTPATIENT)
Age: 55
Setting detail: SPECIMEN
Discharge: HOME | End: 2020-12-16
Payer: COMMERCIAL

## 2020-12-16 ENCOUNTER — TELEMEDICINE (OUTPATIENT)
Dept: FAMILY MEDICINE CLINIC | Age: 55
End: 2020-12-16
Payer: COMMERCIAL

## 2020-12-16 PROCEDURE — 99214 OFFICE O/P EST MOD 30 MIN: CPT | Performed by: FAMILY MEDICINE

## 2020-12-16 PROCEDURE — 2022F DILAT RTA XM EVC RTNOPTHY: CPT | Performed by: FAMILY MEDICINE

## 2020-12-16 PROCEDURE — 3051F HG A1C>EQUAL 7.0%<8.0%: CPT | Performed by: FAMILY MEDICINE

## 2020-12-16 PROCEDURE — G8428 CUR MEDS NOT DOCUMENT: HCPCS | Performed by: FAMILY MEDICINE

## 2020-12-16 PROCEDURE — 3017F COLORECTAL CA SCREEN DOC REV: CPT | Performed by: FAMILY MEDICINE

## 2020-12-16 RX ORDER — AMLODIPINE BESYLATE 5 MG/1
5 TABLET ORAL DAILY
Qty: 30 TABLET | Refills: 0 | Status: SHIPPED | OUTPATIENT
Start: 2020-12-16 | End: 2021-01-25

## 2020-12-16 RX ORDER — SEMAGLUTIDE 1.34 MG/ML
1 INJECTION, SOLUTION SUBCUTANEOUS WEEKLY
Qty: 6 PEN | Refills: 1 | Status: SHIPPED | OUTPATIENT
Start: 2020-12-16 | End: 2021-04-01 | Stop reason: SDUPTHER

## 2020-12-16 RX ORDER — SULFAMETHOXAZOLE AND TRIMETHOPRIM 800; 160 MG/1; MG/1
1 TABLET ORAL 2 TIMES DAILY
Qty: 10 TABLET | Refills: 0 | Status: SHIPPED | OUTPATIENT
Start: 2020-12-16 | End: 2020-12-21

## 2020-12-16 ASSESSMENT — ENCOUNTER SYMPTOMS
RESPIRATORY NEGATIVE: 1
GASTROINTESTINAL NEGATIVE: 1
EYES NEGATIVE: 1

## 2020-12-16 NOTE — PROGRESS NOTES
2020    TELEHEALTH EVALUATION -- Audio/Visual (During [facility]-53 public health emergency)    HPI:    Lorna Hunter (:  1965) has requested an audio/video evaluation for the following concern(s):    The patient presents for HTN, DM-2 and urinary concerns. The patient does complain of urinary frequency, urinary urgency and dysuria. She has increased her water intake and tried to take cranberry tablets with minimal relief. She does have a history of UTI's in the past but it has been a while since her last UTI. The patient's blood pressure and blood sugars have been increased recently. We had difficulty getting the patient her ozempic at the 1 mg dose. She has noticed her blood sugars going up since going down to 0.5mg dose. The patient's diet is inconsistent and she does eat a lot of bananas. She has been stopping eating at 3 PM.       /98, recheck 2 hours later 159/81. Blood sugar 245.    12/15 /72 blood sugar 161   /86 blood sugar 219      Review of Systems   Constitutional: Negative. HENT: Negative. Eyes: Negative. Respiratory: Negative. Cardiovascular: Negative. Gastrointestinal: Negative. Genitourinary: Positive for decreased urine volume, difficulty urinating, dysuria, frequency and urgency. Negative for dyspareunia, enuresis, flank pain, genital sores, hematuria, menstrual problem, pelvic pain, vaginal bleeding, vaginal discharge and vaginal pain. Musculoskeletal: Negative. Skin: Negative. Allergic/Immunologic: Negative for environmental allergies, food allergies and immunocompromised state. Neurological: Negative. Psychiatric/Behavioral: Negative. Prior to Visit Medications    Medication Sig Taking?  Authorizing Provider   amLODIPine (NORVASC) 5 MG tablet Take 1 tablet by mouth daily Yes Lexi Gardiner MD Semaglutide, 1 MG/DOSE, (OZEMPIC, 1 MG/DOSE,) 2 MG/1.5ML SOPN Inject 1 mg into the skin once a week Yes Herbie Torrez MD   sulfamethoxazole-trimethoprim (BACTRIM DS;SEPTRA DS) 800-160 MG per tablet Take 1 tablet by mouth 2 times daily for 5 days Yes Herbie Torrez MD   dapagliflozin (FARXIGA) 5 MG tablet Take 1 tablet by mouth every morning Yes Herbie Torrez MD   Zinc Acetate, Oral, (ZINC ACETATE PO) Take by mouth Yes Historical Provider, MD   FLUoxetine (PROZAC) 20 MG capsule Take 1 capsule by mouth daily Yes Herbie Torrez MD   Suvorexant 5 MG TABS Take 5 mg by mouth nightly as needed (insomnia) for up to 30 days. Yes Herbie Torrez MD   metaxalone (SKELAXIN) 800 MG tablet take 1 tablet by mouth twice a day Yes Historical Provider, MD   losartan (COZAAR) 100 MG tablet Take 1 tablet by mouth daily Yes Herbie Torrez MD   metFORMIN (GLUCOPHAGE-XR) 750 MG extended release tablet Take 1 tablet by mouth 2 times daily (with meals) Yes Herbie Torrez MD   hydroCHLOROthiazide (HYDRODIURIL) 25 MG tablet Take 1 tablet by mouth every morning Yes Herbie Torrez MD   Coenzyme Q10 (COQ10) 200 MG CAPS Take 200 mg by mouth daily Yes Historical Provider, MD   meloxicam (MOBIC) 7.5 MG tablet Take 7.5 mg by mouth every other day Yes Historical Provider, MD   blood glucose monitor strips Test 2 times a day & as needed for symptoms of irregular blood glucose.  Yes PB Robles CNP   Lancets MISC Please dispense lancets covered per insurance Yes PB Robles CNP   atorvastatin (LIPITOR) 20 MG tablet Take 1 tablet by mouth daily  Patient taking differently: Take 10 mg by mouth daily  Yes PB Hamilton CNP   vitamin C (ASCORBIC ACID) 500 MG tablet Take 500 mg by mouth daily Yes Historical Provider, MD   magnesium oxide (MAG-OX) 400 MG tablet Take 400 mg by mouth daily Yes Historical Provider, MD   Saccharomyces boulardii (PROBIOTIC) 250 MG CAPS Take 250 mg by mouth daily Yes Historical Provider, MD Blood Glucose Monitoring Suppl (ONE TOUCH ULTRA 2) w/Device KIT USE TO CHECK BLOOD SUGAR AS DIRECTED Yes Historical Provider, MD   Melatonin 1 MG Take 3 tablets by mouth daily as needed (sleep) Yes PB Quintero CNP   oxyCODONE-acetaminophen (PERCOCET) 5-325 MG per tablet Take 1 tablet by mouth 3 times daily as needed Yes Lyndsey Matute   Cholecalciferol (VITAMIN D) 2000 UNITS CAPS capsule Take 1 capsule by mouth daily. Yes Historical Provider, MD   Omega 3 1000 MG CAPS Take  by mouth daily.  Yes Historical Provider, MD   aspirin EC 81 MG EC tablet Take 1 tablet by mouth daily  PB Gloria CNP       Social History     Tobacco Use    Smoking status: Light Tobacco Smoker     Packs/day: 1.00     Years: 30.00     Pack years: 30.00     Types: Cigarettes     Last attempt to quit: 2011     Years since quittin.0    Smokeless tobacco: Never Used    Tobacco comment: e-cig with lowest nicotine   Substance Use Topics    Alcohol use: No    Drug use: No        Allergies   Allergen Reactions    Pregabalin      Other reaction(s): hand/foot edema    Amaryl [Glimepiride] Other (See Comments)     Low blood sugar    Cyclobenzaprine      Other reaction(s): sedation, constiatpion, dry mouth    Penicillins     Duloxetine Hcl      Other reaction(s): weight gain    Gabapentin Rash   ,   Past Medical History:   Diagnosis Date    Abnormal weight gain 2012    Back pain 2012    Cervical disc disease     Chronic back pain     Diabetes mellitus ([facility] [address]     Fibromyalgia     Hypertension     Labile blood pressure 3/22/2014    Spasm of muscle 2012    Tachycardia 3/21/2014       PHYSICAL EXAMINATION:  [ INSTRUCTIONS:  \"[x]\" Indicates a positive item  \"[]\" Indicates a negative item  -- DELETE ALL ITEMS NOT EXAMINED]  Vital Signs: (As obtained by patient/caregiver or practitioner observation)    Blood pressure-  Heart rate-    Respiratory rate-    Temperature-  Pulse oximetry- Constitutional: [x] Appears well-developed and well-nourished [x] No apparent distress      [] Abnormal-   Mental status  [x] Alert and awake  [x] Oriented to person/place/time [x]Able to follow commands      Eyes:  EOM    [x]  Normal  [] Abnormal-  Sclera  [x]  Normal  [] Abnormal -         Discharge [x]  None visible  [] Abnormal -    HENT:   [x] Normocephalic, atraumatic. [] Abnormal   [x] Mouth/Throat: Mucous membranes are moist.     External Ears [x] Normal  [] Abnormal-     Neck: [x] No visualized mass     Pulmonary/Chest: [x] Respiratory effort normal.  [x] No visualized signs of difficulty breathing or respiratory distress        [] Abnormal-      Musculoskeletal:   [x] Normal gait with no signs of ataxia         [x] Normal range of motion of neck        [] Abnormal-       Neurological:        [x] No Facial Asymmetry (Cranial nerve 7 motor function) (limited exam to video visit)          [x] No gaze palsy        [] Abnormal-         Skin:        [x] No significant exanthematous lesions or discoloration noted on facial skin         [] Abnormal-            Psychiatric:       [x] Normal Affect [x] No Hallucinations        [] Abnormal-     Other pertinent observable physical exam findings-     ASSESSMENT/PLAN:  1. Essential hypertension  Add amlodipine. The patient is to send a message tomorrow with her morning blood pressure readings. Encouraged her to continue other medications as well. - amLODIPine (NORVASC) 5 MG tablet; Take 1 tablet by mouth daily  Dispense: 30 tablet; Refill: 0    2. Type 2 diabetes mellitus without complication, without long-term current use of insulin ([address]  Will try to send through 1 mg dose of ozepmic. Patient counseled about the importance of eating smaller more frequent meals and carb counting. Encouraged her to follow a 100 g carb restriction daily.   She will call in blood sugars as well - Semaglutide, 1 MG/DOSE, (OZEMPIC, 1 MG/DOSE,) 2 MG/1.5ML SOPN; Inject 1 mg into the skin once a week  Dispense: 6 pen; Refill: 1  - dapagliflozin (FARXIGA) 5 MG tablet; Take 1 tablet by mouth every morning  Dispense: 30 tablet; Refill: 2    3. Dysuria  Start antibiotics. Urine culture ordered. If symptoms worse patient will call in. May need to consider to d/c farxiga if urinary symptoms persist despite treatment. - Culture, Urine; Future      No follow-ups on file. Noni Ortiz is a 54 y.o. female being evaluated by a Virtual Visit (video visit) encounter to address concerns as mentioned above. A caregiver was present when appropriate. Due to this being a TeleHealth encounter (During [facility]-00 public health emergency), evaluation of the following organ systems was limited: Vitals/Constitutional/EENT/Resp/CV/GI//MS/Neuro/Skin/Heme-Lymph-Imm. Pursuant to the emergency declaration under the [address] [place] [address] authority and the [employer] Act, this Virtual Visit was conducted with patient's (and/or legal guardian's) consent, to reduce the patient's risk of exposure to COVID-19 and provide necessary medical care. The patient (and/or legal guardian) has also been advised to contact this office for worsening conditions or problems, and seek emergency medical treatment and/or call 911 if deemed necessary. Patient identification was verified at the start of the visit: Yes    Total time spent on this encounter: 20 minutes    Services were provided through a video synchronous discussion virtually to substitute for in-person clinic visit. Patient and provider were located at their individual homes. --Jayne Israel MD on 12/16/2020 at 9:02 AM    An electronic signature was used to authenticate this note.

## 2020-12-17 LAB
CULTURE: NORMAL
Lab: NORMAL
SPECIMEN DESCRIPTION: NORMAL

## 2020-12-25 ENCOUNTER — HOSPITAL ENCOUNTER (EMERGENCY)
Facility: CLINIC | Age: 55
Discharge: HOME | End: 2020-12-25
Attending: EMERGENCY MEDICINE
Payer: COMMERCIAL

## 2020-12-25 VITALS
HEART RATE: 86 BPM | TEMPERATURE: 98.1 F | BODY MASS INDEX: 27.48 KG/M2 | WEIGHT: 140 LBS | RESPIRATION RATE: 16 BRPM | OXYGEN SATURATION: 98 % | DIASTOLIC BLOOD PRESSURE: 89 MMHG | HEIGHT: 60 IN | SYSTOLIC BLOOD PRESSURE: 172 MMHG

## 2020-12-25 PROCEDURE — 6360000002 HC RX W HCPCS: Performed by: EMERGENCY MEDICINE

## 2020-12-25 PROCEDURE — 99282 EMERGENCY DEPT VISIT SF MDM: CPT

## 2020-12-25 PROCEDURE — 96372 THER/PROPH/DIAG INJ SC/IM: CPT

## 2020-12-25 RX ORDER — KETOROLAC TROMETHAMINE 30 MG/ML
60 INJECTION, SOLUTION INTRAMUSCULAR; INTRAVENOUS ONCE
Status: COMPLETED | OUTPATIENT
Start: 2020-12-25 | End: 2020-12-25

## 2020-12-25 RX ORDER — PREDNISONE 20 MG/1
20 TABLET ORAL DAILY
Qty: 5 TABLET | Refills: 0 | Status: SHIPPED | OUTPATIENT
Start: 2020-12-25 | End: 2020-12-30

## 2020-12-25 RX ORDER — PREDNISONE 20 MG/1
20 TABLET ORAL DAILY
Qty: 5 TABLET | Refills: 0 | Status: SHIPPED | OUTPATIENT
Start: 2020-12-25 | End: 2020-12-25 | Stop reason: SDUPTHER

## 2020-12-25 RX ADMIN — KETOROLAC TROMETHAMINE 60 MG: 30 INJECTION, SOLUTION INTRAMUSCULAR at 14:30

## 2020-12-25 ASSESSMENT — PAIN DESCRIPTION - ORIENTATION: ORIENTATION: RIGHT

## 2020-12-25 ASSESSMENT — PAIN SCALES - GENERAL: PAINLEVEL_OUTOF10: 7

## 2020-12-25 ASSESSMENT — PAIN DESCRIPTION - LOCATION: LOCATION: NECK;SHOULDER

## 2020-12-25 ASSESSMENT — PAIN DESCRIPTION - FREQUENCY: FREQUENCY: CONTINUOUS

## 2020-12-25 ASSESSMENT — PAIN DESCRIPTION - ONSET: ONSET: GRADUAL

## 2020-12-25 ASSESSMENT — PAIN DESCRIPTION - PAIN TYPE: TYPE: ACUTE PAIN

## 2020-12-25 ASSESSMENT — PAIN DESCRIPTION - PROGRESSION: CLINICAL_PROGRESSION: NOT CHANGED

## 2020-12-25 NOTE — ED PROVIDER NOTES
[facility]  [facility]  Phone: [phone] Natalie Pak      Pt Name: Delma Ram  MRN: [id number]  [place] 1965  Date of evaluation: 12/25/2020    CHIEF COMPLAINT       Chief Complaint   Patient presents with    Shoulder Pain    Neck Pain    Headache       HISTORY OF PRESENT ILLNESS    Urvashi Martínez is a 54 y.o. female who presents neck and right shoulder pain. She states that she has had a history of chronic neck pain and has had multiple spinal fusions including in the cervical spine she is undergoing pain management this time. States that there is no history of trauma she does note pain that seems to be down the right side of her neck and into her shoulder blade area. She notes no significant distal numbness or tingling or change in bowel or bladder function. REVIEW OF SYSTEMS     Constitutional: No fevers or chills   HEENT: No sore throat, rhinorrhea, or earache   Eyes: No blurry vision or double vision no drainage   Cardiovascular: No chest pain or tachycardia   Respiratory: No wheezing or shortness of breath no cough   Gastrointestinal: No nausea, vomiting, diarrhea, constipation, or abdominal pain   : No hematuria or dysuria   Musculoskeletal: See above  Skin: No rash   Neurological: No focal neurologic complaints, paresthesias, weakness, or headache   PAST MEDICAL HISTORY    has a past medical history of Abnormal weight gain, Back pain, Cervical disc disease, Chronic back pain, Diabetes mellitus ([address] Fibromyalgia, Hypertension, Labile blood pressure, Spasm of muscle, and Tachycardia. SURGICAL HISTORY      has a past surgical history that includes Cervical discectomy (07); Tubal ligation; Tonsillectomy; Hemorrhoid surgery; Breast enhancement surgery; Cervical disc surgery (03/21/2014); and other surgical history (08/2018).     CURRENT MEDICATIONS       Previous Medications AMLODIPINE (NORVASC) 5 MG TABLET    Take 1 tablet by mouth daily    ASPIRIN EC 81 MG EC TABLET    Take 1 tablet by mouth daily    ATORVASTATIN (LIPITOR) 20 MG TABLET    Take 1 tablet by mouth daily    BLOOD GLUCOSE MONITOR STRIPS    Test 2 times a day & as needed for symptoms of irregular blood glucose. BLOOD GLUCOSE MONITORING SUPPL (ONE TOUCH ULTRA 2) W/DEVICE KIT    USE TO CHECK BLOOD SUGAR AS DIRECTED    CHOLECALCIFEROL (VITAMIN D) 2000 UNITS CAPS CAPSULE    Take 1 capsule by mouth daily. COENZYME Q10 (COQ10) 200 MG CAPS    Take 200 mg by mouth daily    DAPAGLIFLOZIN (FARXIGA) 5 MG TABLET    Take 1 tablet by mouth every morning    FLUOXETINE (PROZAC) 20 MG CAPSULE    Take 1 capsule by mouth daily    HYDROCHLOROTHIAZIDE (HYDRODIURIL) 25 MG TABLET    Take 1 tablet by mouth every morning    LANCETS MISC    Please dispense lancets covered per insurance    LOSARTAN (COZAAR) 100 MG TABLET    Take 1 tablet by mouth daily    MAGNESIUM OXIDE (MAG-OX) 400 MG TABLET    Take 400 mg by mouth daily    MELATONIN 1 MG    Take 3 tablets by mouth daily as needed (sleep)    MELOXICAM (MOBIC) 7.5 MG TABLET    Take 7.5 mg by mouth every other day    METAXALONE (SKELAXIN) 800 MG TABLET    take 1 tablet by mouth twice a day    METFORMIN (GLUCOPHAGE-XR) 750 MG EXTENDED RELEASE TABLET    Take 1 tablet by mouth 2 times daily (with meals)    OMEGA 3 1000 MG CAPS    Take  by mouth daily. OXYCODONE-ACETAMINOPHEN (PERCOCET) 5-325 MG PER TABLET    Take 1 tablet by mouth 3 times daily as needed    SACCHAROMYCES BOULARDII (PROBIOTIC) 250 MG CAPS    Take 250 mg by mouth daily    SEMAGLUTIDE, 1 MG/DOSE, (OZEMPIC, 1 MG/DOSE,) 2 MG/1.5ML SOPN    Inject 1 mg into the skin once a week    SUVOREXANT 5 MG TABS    Take 5 mg by mouth nightly as needed (insomnia) for up to 30 days.     VITAMIN C (ASCORBIC ACID) 500 MG TABLET    Take 500 mg by mouth daily    ZINC ACETATE, ORAL, (ZINC ACETATE PO)    Take by mouth       ALLERGIES is allergic to pregabalin; amaryl [glimepiride]; cyclobenzaprine; penicillins; duloxetine hcl; and gabapentin. FAMILY HISTORY     She indicated that her mother is . She indicated that her father is alive. She indicated that her brother is alive. She indicated that her maternal grandmother is . She indicated that her maternal grandfather is . She indicated that her paternal grandmother is . She indicated that her paternal grandfather is . She indicated that the status of her neg hx is unknown.     family history includes Diabetes in her father, mother, and paternal grandmother; Diabetes type 2  in her maternal grandmother; Heart Attack in her maternal grandfather; Heart Disease in her maternal grandmother and mother; Heart Failure in her father; High Blood Pressure in her father; Kidney Disease in her father; Osteoarthritis in her paternal grandfather; Other in her mother; Stroke in her brother. SOCIAL HISTORY      reports that she has been smoking cigarettes. She has a 30.00 pack-year smoking history. She has never used smokeless tobacco. She reports that she does not drink alcohol or use drugs.     PHYSICAL EXAM       ED Triage Vitals [20 1420]   BP Temp Temp Source Pulse Resp SpO2 Height Weight   (!) 172/89 98.1 °F (36.7 °C) Oral 86 16 98 % 5' (1.524 m) 140 lb (63.5 kg)     Constitutional: Alert, oriented x3, nontoxic, answering questions appropriately, acting properly for age, in no acute distress   HEENT: Extraocular muscles intact, mucus membranes moist, TMs clear bilaterally, no posterior pharyngeal erythema or exudates, Pupils equal, round, reactive to light,   Neck: Trachea midline   Cardiovascular: Regular rhythm and rate no S3, S4, or murmurs   Respiratory: Clear to auscultation bilaterally no wheezes, rhonchi, rales, no respiratory distress no tachypnea no retractions no hypoxia Gastrointestinal: Soft, nontender, nondistended, positive bowel sounds. No rebound, rigidity, or guarding. Musculoskeletal: Examination of the cervical spine area reveals areas of focal tenderness in the posterior occipital region on the right. There appears to be some tenderness also in the trapezius muscle group on the right. No overlying rash. Neurologic: Moving all 4 extremities without difficulty there are no gross focal neurologic deficits   Skin: Warm and dry     DIFFERENTIAL DIAGNOSIS/ MDM:     Explained to the patient that CT exam of the neck would probably be low yield and expose her to a significant amount of radiation. Told her that she has follow-up with her pain management people and they might indicate a MRI scan feel is necessary. I told her I do not do injections into the muscle group area or into the neck area. She is agreeable to taking a shot of Toradol at this time she states the steroids make her blood sugars go out of control and is requesting that she not get the steroids. DIAGNOSTIC RESULTS     EKG: All EKG's are interpreted by the Emergency Department Physician who either signs or Co-signs this chart in the absence of a cardiologist.        Not indicated unless otherwise documented above    LABS:  No results found for this visit on 12/25/20.     Not indicated unless otherwise documented above    RADIOLOGY:   I reviewed the radiologist interpretations:    No orders to display       Not indicated unless otherwise documented above    EMERGENCY DEPARTMENT COURSE:     The patient was given the following medications:  Orders Placed This Encounter   Medications    ketorolac (TORADOL) injection 60 mg        Vitals:   -------------------------  BP (!) 172/89   Pulse 86   Temp 98.1 °F (36.7 °C) (Oral)   Resp 16   Ht 5' (1.524 m)   Wt 63.5 kg (140 lb)   SpO2 98%   BMI 27.34 kg/m² I have reviewed the disposition diagnosis with the patient and or their family/guardian. I have answered their questions and given discharge instructions. They voiced understanding of these instructions and did not have any furtherquestions or complaints. CRITICAL CARE:    None    CONSULTS:    None    PROCEDURES:    None      OARRS Report if indicated             FINAL IMPRESSION      1.  Radiculopathy of [id number]-[id number]-[id number] region          DISPOSITION/PLAN   DISPOSITION Decision To Discharge 12/25/2020 02:31:01 PM        CONDITION ON DISPOSITION: STABLE       PATIENT REFERRED TO:  Viridiana Dhaliwal MD  [facility] [address] [place], [place] [id number]      If symptoms worsen      DISCHARGE MEDICATIONS:  New Prescriptions    No medications on file       (Please note that portions of thisnote were completed with a voice recognition program.  Efforts were made to edit the dictations but occasionally words are mis-transcribed.)    Judd MD  Attending Emergency Physician        Tim Duke MD  12/25/20 [id number]

## 2021-01-06 DIAGNOSIS — E11.9 TYPE 2 DIABETES MELLITUS WITHOUT COMPLICATION, WITHOUT LONG-TERM CURRENT USE OF INSULIN (HCC): ICD-10-CM

## 2021-01-06 DIAGNOSIS — E78.1 PURE HYPERGLYCERIDEMIA: ICD-10-CM

## 2021-01-06 RX ORDER — ATORVASTATIN CALCIUM 20 MG/1
20 TABLET, FILM COATED ORAL DAILY
Qty: 90 TABLET | Refills: 0 | Status: SHIPPED | OUTPATIENT
Start: 2021-01-06 | End: 2021-03-29

## 2021-01-06 NOTE — TELEPHONE ENCOUNTER
Patient requesting a medication refill.   Medication: atorvastatin (LIPITOR) 20 MG tablet   Pharmacy: [place] [address], [address] [facility], [place] [phone] [place] [phone]   Last office visit: 12/16/20

## 2021-01-22 DIAGNOSIS — I10 ESSENTIAL HYPERTENSION: ICD-10-CM

## 2021-01-25 ENCOUNTER — PATIENT MESSAGE (OUTPATIENT)
Dept: FAMILY MEDICINE CLINIC | Age: 56
End: 2021-01-25

## 2021-01-25 DIAGNOSIS — I10 ESSENTIAL HYPERTENSION: ICD-10-CM

## 2021-01-25 RX ORDER — HYDROCHLOROTHIAZIDE 25 MG/1
25 TABLET ORAL EVERY MORNING
Qty: 90 TABLET | Refills: 1 | Status: SHIPPED | OUTPATIENT
Start: 2021-01-25 | End: 2021-06-10

## 2021-01-25 RX ORDER — AMLODIPINE BESYLATE 5 MG/1
5 TABLET ORAL DAILY
Qty: 30 TABLET | Refills: 0 | Status: SHIPPED | OUTPATIENT
Start: 2021-01-25 | End: 2021-02-26

## 2021-01-25 NOTE — TELEPHONE ENCOUNTER
Next Visit Date:  No future appointments. Health Maintenance   Topic Date Due    Pneumococcal 0-64 years Vaccine (1 of 1 - PPSV23) [birth year]/30/1971    Diabetic retinal exam  09/23/2011    Breast cancer screen  03/30/2015    Colon Cancer Screen FIT/FOBT  04/28/2018    Diabetic microalbuminuria test  11/07/2019    Low dose CT lung screening  03/30/2020    Shingles Vaccine (2 of 2) 12/23/2020    Hepatitis B vaccine (1 of 3 - Risk 3-dose series) 10/12/2021 (Originally 3/30/1984)    Lipid screen  07/20/2021    Potassium monitoring  07/20/2021    Creatinine monitoring  07/20/2021    Diabetic foot exam  10/12/2021    A1C test (Diabetic or Prediabetic)  10/27/2021    Cervical cancer screen  09/04/2024    DTaP/Tdap/Td vaccine (2 - Td) 08/28/2025    Flu vaccine  Completed    Hepatitis C screen  Completed    HIV screen  Completed    Hepatitis A vaccine  Aged Out    Hib vaccine  Aged Out    Meningococcal (ACWY) vaccine  Aged Out       Hemoglobin A1C (%)   Date Value   10/27/2020 7.8   07/20/2020 10.1   07/10/2019 8.0             ( goal A1C is < 7)   Microalb/Crt.  Ratio (mcg/mg creat)   Date Value   11/07/2018 CANNOT BE CALCULATED     LDL Calculated (mg/dL)   Date Value   07/20/2020 77   07/10/2019 87       (goal LDL is <100)   AST (U/L)   Date Value   07/20/2020 27     ALT (U/L)   Date Value   07/20/2020 44     BUN (mg/dL)   Date Value   07/20/2020 16     BP Readings from Last 3 Encounters:   12/25/20 (!) 172/89   10/12/20 (!) 168/88   09/14/20 (!) 145/84          (goal 120/80)    All Future Testing planned in [facility]  Lab Frequency Next Occurrence   MAURY Digital Screen Bilateral [AFF0111] Once 11/11/2020   Microalbumin, Ur Once 11/11/2020   MRI CERVICAL SPINE [address] Once 12/31/2020               Patient Active Problem List:     Migraine     DDD (degenerative disc disease), cervical     Mild obstructive sleep apnea     DAV (generalized anxiety disorder)     Essential hypertension     Type 2 diabetes mellitus without complication, without long-term current use of insulin (HCC)     Insomnia     Chronic pain     Displacement of lumbar intervertebral disc without myelopathy     Facet syndrome     Acquired spondylolisthesis     Cervical post-laminectomy syndrome     Tobacco use

## 2021-01-25 NOTE — TELEPHONE ENCOUNTER
From: Bharat Bloom  To: Petty Mena MD  Sent: 1/25/2021 8:31 AM EST  Subject: Prescription Question    Hi, I need a refill on hydrochlorothiazide cap 12.5 mg. Called in through [facility] if possible.

## 2021-01-29 ENCOUNTER — PATIENT MESSAGE (OUTPATIENT)
Dept: FAMILY MEDICINE CLINIC | Age: 56
End: 2021-01-29

## 2021-01-29 DIAGNOSIS — F41.9 ANXIETY: ICD-10-CM

## 2021-01-29 RX ORDER — FLUOXETINE HYDROCHLORIDE 20 MG/1
20 CAPSULE ORAL DAILY
Qty: 30 CAPSULE | Refills: 0 | Status: SHIPPED | OUTPATIENT
Start: 2021-01-29 | End: 2021-02-22 | Stop reason: SDUPTHER

## 2021-01-29 NOTE — TELEPHONE ENCOUNTER
From: Harvey Godinez  To: Glorianne Goldberg, MD  Sent: 1/29/2021 8:27 AM EST  Subject: Prescription Question    Good morning!   I need a refill on my prozac

## 2021-01-30 ENCOUNTER — HOSPITAL ENCOUNTER (OUTPATIENT)
Dept: MAMMOGRAPHY | Age: 56
Discharge: HOME | End: 2021-02-01
Payer: COMMERCIAL

## 2021-01-30 DIAGNOSIS — Z12.31 ENCOUNTER FOR SCREENING MAMMOGRAM FOR BREAST CANCER: ICD-10-CM

## 2021-01-30 PROCEDURE — 77063 BREAST TOMOSYNTHESIS BI: CPT

## 2021-02-22 ENCOUNTER — HOSPITAL ENCOUNTER (OUTPATIENT)
Dept: ULTRASOUND IMAGING | Age: 56
Discharge: HOME | End: 2021-02-24
Payer: COMMERCIAL

## 2021-02-22 ENCOUNTER — HOSPITAL ENCOUNTER (OUTPATIENT)
Dept: MAMMOGRAPHY | Age: 56
Discharge: HOME | End: 2021-02-24
Payer: COMMERCIAL

## 2021-02-22 DIAGNOSIS — R92.8 ABNORMALITY OF RIGHT BREAST ON SCREENING MAMMOGRAM: ICD-10-CM

## 2021-02-22 PROCEDURE — 76642 ULTRASOUND BREAST LIMITED: CPT

## 2021-03-29 DIAGNOSIS — E11.9 TYPE 2 DIABETES MELLITUS WITHOUT COMPLICATION, WITHOUT LONG-TERM CURRENT USE OF INSULIN (HCC): ICD-10-CM

## 2021-03-29 DIAGNOSIS — E78.1 PURE HYPERGLYCERIDEMIA: ICD-10-CM

## 2021-03-29 RX ORDER — ATORVASTATIN CALCIUM 20 MG/1
TABLET, FILM COATED ORAL
Qty: 90 TABLET | Refills: 0 | Status: SHIPPED | OUTPATIENT
Start: 2021-03-29 | End: 2021-04-01

## 2021-03-29 NOTE — TELEPHONE ENCOUNTER
Electronic medication refill request. Pharmacy on file. Please advise. Next Visit Date:  No future appointments. Health Maintenance   Topic Date Due    Pneumococcal 0-64 years Vaccine (1 of 1 - PPSV23) Never done    COVID-19 Vaccine (1) Never done    Diabetic retinal exam  09/23/2011    Colon Cancer Screen FIT/FOBT  04/28/2018    Diabetic microalbuminuria test  11/07/2019    Low dose CT lung screening  Never done    Shingles Vaccine (2 of 2) 12/23/2020    Hepatitis B vaccine (1 of 3 - Risk 3-dose series) 10/12/2021 (Originally 3/30/1984)    Lipid screen  07/20/2021    Potassium monitoring  07/20/2021    Creatinine monitoring  07/20/2021    Diabetic foot exam  10/12/2021    A1C test (Diabetic or Prediabetic)  10/27/2021    Breast cancer screen  01/30/2023    Cervical cancer screen  09/04/2024    DTaP/Tdap/Td vaccine (2 - Td) 08/28/2025    Flu vaccine  Completed    Hepatitis C screen  Completed    HIV screen  Completed    Hepatitis A vaccine  Aged Out    Hib vaccine  Aged Out    Meningococcal (ACWY) vaccine  Aged Out       Hemoglobin A1C (%)   Date Value   10/27/2020 7.8   07/20/2020 10.1   07/10/2019 8.0             ( goal A1C is < 7)   Microalb/Crt.  Ratio (mcg/mg creat)   Date Value   11/07/2018 CANNOT BE CALCULATED     LDL Calculated (mg/dL)   Date Value   07/20/2020 77   07/10/2019 87       (goal LDL is <100)   AST (U/L)   Date Value   07/20/2020 27     ALT (U/L)   Date Value   07/20/2020 44     BUN (mg/dL)   Date Value   07/20/2020 16     BP Readings from Last 3 Encounters:   12/25/20 (!) 172/89   10/12/20 (!) 168/88   09/14/20 (!) 145/84          (goal 120/80)    All Future Testing planned in [facility]  Lab Frequency Next Occurrence   Microalbumin, Ur Once 11/11/2020   MRI CERVICAL SPINE WO CONTRAST Once 12/31/2020               Patient Active Problem List:     Migraine     DDD (degenerative disc disease), cervical     Mild obstructive sleep apnea     DAV (generalized anxiety disorder)     Essential hypertension     Type 2 diabetes mellitus without complication, without long-term current use of insulin (HCC)     Insomnia     Chronic pain     Displacement of lumbar intervertebral disc without myelopathy     Facet syndrome     Acquired spondylolisthesis     Cervical post-laminectomy syndrome     Tobacco use

## 2021-04-01 ENCOUNTER — OFFICE VISIT (OUTPATIENT)
Dept: FAMILY MEDICINE CLINIC | Age: 56
End: 2021-04-01
Payer: COMMERCIAL

## 2021-04-01 ENCOUNTER — HOSPITAL ENCOUNTER (OUTPATIENT)
Age: 56
Setting detail: SPECIMEN
Discharge: HOME | End: 2021-04-01
Payer: COMMERCIAL

## 2021-04-01 VITALS
HEIGHT: 60 IN | SYSTOLIC BLOOD PRESSURE: 130 MMHG | OXYGEN SATURATION: 96 % | WEIGHT: 137 LBS | BODY MASS INDEX: 26.9 KG/M2 | HEART RATE: 75 BPM | DIASTOLIC BLOOD PRESSURE: 72 MMHG | TEMPERATURE: 96.6 F

## 2021-04-01 DIAGNOSIS — Z72.0 TOBACCO USE: Chronic | ICD-10-CM

## 2021-04-01 DIAGNOSIS — Z23 NEED FOR PROPHYLACTIC VACCINATION AND INOCULATION AGAINST VARICELLA: ICD-10-CM

## 2021-04-01 DIAGNOSIS — M51.26 DISPLACEMENT OF LUMBAR INTERVERTEBRAL DISC WITHOUT MYELOPATHY: ICD-10-CM

## 2021-04-01 DIAGNOSIS — E11.9 TYPE 2 DIABETES MELLITUS WITHOUT COMPLICATION, WITHOUT LONG-TERM CURRENT USE OF INSULIN (HCC): Primary | ICD-10-CM

## 2021-04-01 DIAGNOSIS — M50.30 DDD (DEGENERATIVE DISC DISEASE), CERVICAL: ICD-10-CM

## 2021-04-01 DIAGNOSIS — E11.9 TYPE 2 DIABETES MELLITUS WITHOUT COMPLICATION, WITHOUT LONG-TERM CURRENT USE OF INSULIN (HCC): ICD-10-CM

## 2021-04-01 DIAGNOSIS — F41.1 GAD (GENERALIZED ANXIETY DISORDER): ICD-10-CM

## 2021-04-01 DIAGNOSIS — I10 ESSENTIAL HYPERTENSION: ICD-10-CM

## 2021-04-01 DIAGNOSIS — Z87.891 PERSONAL HISTORY OF TOBACCO USE: ICD-10-CM

## 2021-04-01 DIAGNOSIS — E78.1 PURE HYPERGLYCERIDEMIA: ICD-10-CM

## 2021-04-01 LAB
CREATININE URINE: 41.4 MG/DL (ref 28–217)
MICROALBUMIN/CREAT 24H UR: <12 MG/L
MICROALBUMIN/CREAT UR-RTO: NORMAL MCG/MG CREAT

## 2021-04-01 PROCEDURE — 2022F DILAT RTA XM EVC RTNOPTHY: CPT | Performed by: FAMILY MEDICINE

## 2021-04-01 PROCEDURE — 4004F PT TOBACCO SCREEN RCVD TLK: CPT | Performed by: FAMILY MEDICINE

## 2021-04-01 PROCEDURE — G8427 DOCREV CUR MEDS BY ELIG CLIN: HCPCS | Performed by: FAMILY MEDICINE

## 2021-04-01 PROCEDURE — 3046F HEMOGLOBIN A1C LEVEL >9.0%: CPT | Performed by: FAMILY MEDICINE

## 2021-04-01 PROCEDURE — G0296 VISIT TO DETERM LDCT ELIG: HCPCS | Performed by: FAMILY MEDICINE

## 2021-04-01 PROCEDURE — 99214 OFFICE O/P EST MOD 30 MIN: CPT | Performed by: FAMILY MEDICINE

## 2021-04-01 PROCEDURE — 3017F COLORECTAL CA SCREEN DOC REV: CPT | Performed by: FAMILY MEDICINE

## 2021-04-01 PROCEDURE — G8417 CALC BMI ABV UP PARAM F/U: HCPCS | Performed by: FAMILY MEDICINE

## 2021-04-01 RX ORDER — SEMAGLUTIDE 1.34 MG/ML
1 INJECTION, SOLUTION SUBCUTANEOUS WEEKLY
Qty: 6 PEN | Refills: 1 | Status: SHIPPED | OUTPATIENT
Start: 2021-04-01 | End: 2021-06-10

## 2021-04-01 RX ORDER — ATORVASTATIN CALCIUM 10 MG/1
10 TABLET, FILM COATED ORAL DAILY
Qty: 90 TABLET | Refills: 1 | Status: SHIPPED | OUTPATIENT
Start: 2021-04-01

## 2021-04-01 RX ORDER — METFORMIN HYDROCHLORIDE 750 MG/1
750 TABLET, EXTENDED RELEASE ORAL 2 TIMES DAILY WITH MEALS
Qty: 180 TABLET | Refills: 1 | Status: SHIPPED | OUTPATIENT
Start: 2021-04-01 | End: 2021-09-01

## 2021-04-01 ASSESSMENT — ENCOUNTER SYMPTOMS
RESPIRATORY NEGATIVE: 1
GASTROINTESTINAL NEGATIVE: 1
EYES NEGATIVE: 1

## 2021-04-01 ASSESSMENT — PATIENT HEALTH QUESTIONNAIRE - PHQ9
SUM OF ALL RESPONSES TO PHQ9 QUESTIONS 1 & 2: 0
2. FEELING DOWN, DEPRESSED OR HOPELESS: 0
1. LITTLE INTEREST OR PLEASURE IN DOING THINGS: 0
SUM OF ALL RESPONSES TO PHQ QUESTIONS 1-9: 0

## 2021-04-01 NOTE — PROGRESS NOTES
[address] PRIMARY CARE  [address] [place] [address]  Dept: [phone]  Dept Fax: [phone]    Cande Waters is a 64 y.o. female who is a Established patient, who presents today for her medical conditions/complaints as noted below:  Chief Complaint   Patient presents with    3 Month Follow-Up         HPI:     The patient is a 45-year-old female with past medical history significant for hypertension, diabetes, degenerative disc disease, tobacco use and anxiety who presents today for 3-month recheck. Since her last visit the patient has had significant stress with her marital relationship. There is a potential that she may be pursuing divorce in the future. The stresses come to ahead but she states she finally is feeling like she is going under good direction. PHQ 2 was reviewed and was 0. The patient does not regularly check her blood sugars but states overall she is feeling better. The SGLT2 class cost so much my that she may have to go off of it. She has lost a significant amount of weight since her last visit. The patient is following with pain management for her chronic back pain. She has pursued injections and is also on pain medications. Hemoglobin A1C (%)   Date Value   10/27/2020 7.8   07/20/2020 10.1   07/10/2019 8.0             ( goal A1Cis < 7)   Microalb/Crt.  Ratio (mcg/mg creat)   Date Value   11/07/2018 CANNOT BE CALCULATED     LDL Calculated (mg/dL)   Date Value   07/20/2020 77   07/10/2019 87   08/29/2018 82       (goal LDL is <100)   AST (U/L)   Date Value   07/20/2020 27     ALT (U/L)   Date Value   07/20/2020 44     BUN (mg/dL)   Date Value   07/20/2020 16     BP Readings from Last 3 Encounters:   04/01/21 130/72   12/25/20 (!) 172/89   10/12/20 (!) 168/88          (goal 120/80)    Past Medical History:   Diagnosis Date    Abnormal weight gain 5/14/2012    Back pain 4/18/2012    Cervical disc disease     Chronic back pain     [facility] mellitus ([facility] [address]     Fibromyalgia     Hypertension     Labile blood pressure 3/22/2014    Spasm of muscle 2012    Tachycardia 3/21/2014      Past Surgical History:   Procedure Laterality Date    BREAST ENHANCEMENT SURGERY      CERVICAL [employer] SURGERY  2014    ANTERIOR CERVICAL MICRODISECTOMY AND FUSION C 6-7--saw  in [place]  then  was done at [address] OTHER SURGICAL HISTORY  2018    fistulotomy repair, Dr Chantelle Kwon History   Problem Relation Age of Onset    Diabetes Mother     Heart Disease Mother         had CABG x 2    Other Mother         peripheral arterial disease    Diabetes Father     High Blood Pressure Father     Kidney Disease Father     Heart Failure Father     Stroke Brother         was 47years old   [place] Diabetes type 2  Maternal Grandmother     Heart Disease Maternal Grandmother          at [facility]    Heart Attack Maternal Grandfather          in his 42's    Diabetes Paternal Grandmother     Osteoarthritis Paternal Grandfather     Breast Cancer Neg Hx     Uterine Cancer Neg Hx     Colon Cancer Neg Hx     Ovarian Cancer Neg Hx        Social History     Tobacco Use    Smoking status: Light Tobacco Smoker     Packs/day: 1.00     Years: 30.00     Pack years: 30.00     Types: Cigarettes     Last attempt to quit: 2011     Years since quittin.3    Smokeless tobacco: Never Used    Tobacco comment: e-cig with lowest nicotine   Substance Use Topics    Alcohol use: No      Current Outpatient Medications   Medication Sig Dispense Refill    zoster recombinant adjuvanted vaccine (SHINGRIX) 50 MCG/0.5ML SUSR injection Inject 0.5 mLs into the muscle once for 1 dose 50 MCG IM then repeat 2-6 months.  1 each 1    atorvastatin (LIPITOR) 10 MG tablet Take 1 tablet by mouth daily 90 tablet 1    metFORMIN (GLUCOPHAGE-XR) 750 MG extended release tablet Take 1 tablet by mouth 2 times daily (with meals) 180 tablet 1    Semaglutide, 1 MG/DOSE, (OZEMPIC, 1 MG/DOSE,) 2 MG/1.5ML SOPN Inject 1 mg into the skin once a week 6 pen 1    amLODIPine (NORVASC) 5 MG tablet take 1 tablet by mouth daily 30 tablet 2    FLUoxetine (PROZAC) 20 MG capsule Take 1 capsule by mouth daily 90 capsule 1    hydroCHLOROthiazide (HYDRODIURIL) 25 MG tablet Take 1 tablet by mouth every morning 90 tablet 1    dapagliflozin (FARXIGA) 5 MG tablet Take 1 tablet by mouth every morning 90 tablet 0    metaxalone (SKELAXIN) 800 MG tablet take 1 tablet by mouth twice a day      losartan (COZAAR) 100 MG tablet Take 1 tablet by mouth daily 90 tablet 1    meloxicam (MOBIC) 7.5 MG tablet Take 7.5 mg by mouth every other day      blood glucose monitor strips Test 2 times a day & as needed for symptoms of irregular blood glucose. 100 strip 5    Lancets MISC Please dispense lancets covered per insurance 100 each 3    aspirin EC 81 MG EC tablet Take 1 tablet by mouth daily 30 tablet     vitamin C (ASCORBIC ACID) 500 MG tablet Take 500 mg by mouth daily      magnesium oxide (MAG-OX) 400 MG tablet Take 400 mg by mouth daily      Saccharomyces boulardii (PROBIOTIC) 250 MG CAPS Take 250 mg by mouth daily      Blood Glucose Monitoring Suppl (ONE TOUCH ULTRA 2) w/Device KIT USE TO CHECK BLOOD SUGAR AS DIRECTED  0    Melatonin 1 MG Take 3 tablets by mouth daily as needed (sleep) 90 tablet 0    oxyCODONE-acetaminophen (PERCOCET) 5-325 MG per tablet Take 1 tablet by mouth 3 times daily as needed  0    Cholecalciferol (VITAMIN D) 2000 UNITS CAPS capsule Take 1 capsule by mouth daily.  Omega 3 1000 MG CAPS Take  by mouth daily.  Zinc Acetate, Oral, (ZINC ACETATE PO) Take by mouth      Coenzyme Q10 (COQ10) 200 MG CAPS Take 200 mg by mouth daily       No current facility-administered medications for this visit.       Allergies   Allergen Reactions    Pregabalin      Other reaction(s): hand/foot edema    Amaryl [Glimepiride] Other (See Comments)     Low blood sugar    Cyclobenzaprine      Other reaction(s): sedation, constiatpion, dry mouth    Penicillins     Duloxetine Hcl      Other reaction(s): weight gain    Gabapentin Rash       Health Maintenance   Topic Date Due    Diabetic retinal exam  09/23/2011    Colon Cancer Screen FIT/FOBT  04/28/2018    Diabetic microalbuminuria test  11/07/2019    Low dose CT lung screening  Never done    Shingles Vaccine (2 of 2) 12/23/2020    Pneumococcal 0-64 years Vaccine (1 of 1 - PPSV23) 09/14/2021 (Originally 3/30/1971)    Hepatitis B vaccine (1 of 3 - Risk 3-dose series) 10/12/2021 (Originally [birth year]/30/1984)    COVID-19 Vaccine (2 - Pfizer 2-dose series) 04/07/2021    Lipid screen  07/20/2021    Potassium monitoring  07/20/2021    Creatinine monitoring  07/20/2021    Diabetic foot exam  10/12/2021    A1C test (Diabetic or Prediabetic)  10/27/2021    Breast cancer screen  01/30/2023    Cervical cancer screen  09/04/2024    DTaP/Tdap/Td vaccine (2 - Td) 08/28/2025    Flu vaccine  Completed    Hepatitis C screen  Completed    HIV screen  Completed    Hepatitis A vaccine  Aged Out    Hib vaccine  Aged Out    Meningococcal (ACWY) vaccine  Aged Out       Subjective:     Review of Systems   Constitutional: Negative. HENT: Negative. Eyes: Negative. Respiratory: Negative. Cardiovascular: Negative. Gastrointestinal: Negative. Genitourinary: Negative. Musculoskeletal: Negative. Skin: Negative. Allergic/Immunologic: Negative for environmental allergies, food allergies and immunocompromised state. Neurological: Negative. Psychiatric/Behavioral: Negative. Objective:     Physical Exam  Vitals signs and nursing note reviewed. Constitutional:       General: She is awake. She is not in acute distress. Appearance: Normal appearance. She is not ill-appearing, toxic-appearing or diaphoretic.    HENT:      Head: Normocephalic and atraumatic. Right Ear: External ear normal.      Left Ear: External ear normal.      Nose: Nose normal.   Eyes:      Extraocular Movements: Extraocular movements intact. Conjunctiva/sclera: Conjunctivae normal.      Pupils: Pupils are equal, round, and reactive to light. Neck:      Musculoskeletal: Normal range of motion and neck supple. Cardiovascular:      Rate and Rhythm: Normal rate and regular rhythm. Pulses: Normal pulses. Heart sounds: Normal heart sounds. No murmur. No friction rub. No gallop. Pulmonary:      Effort: Pulmonary effort is normal. No respiratory distress. Breath sounds: Normal breath sounds. No stridor. No wheezing, rhonchi or rales. Musculoskeletal: Normal range of motion. Skin:     General: Skin is warm and dry. Capillary Refill: Capillary refill takes less than 2 seconds. Neurological:      General: No focal deficit present. Mental Status: She is alert and oriented to person, place, and time. Psychiatric:         Attention and Perception: Attention normal.         Mood and Affect: Mood and affect normal.         Speech: Speech normal.         Behavior: Behavior normal.       /72   Pulse 75   Temp 96.6 °F (35.9 °C)   Ht 5' (1.524 m)   Wt 137 lb (62.1 kg)   SpO2 96%   BMI 26.76 kg/m²     Assessment:       Diagnosis Orders   1. Type 2 diabetes mellitus without complication, without long-term current use of insulin (HCC)  Microalbumin, Ur    atorvastatin (LIPITOR) 10 MG tablet    Lipid, Fasting    Comprehensive Metabolic Panel, Fasting    Hemoglobin A1C    metFORMIN (GLUCOPHAGE-XR) 750 MG extended release tablet    Semaglutide, 1 MG/DOSE, (OZEMPIC, 1 MG/DOSE,) 2 MG/1.5ML SOPN   2. Pure hyperglyceridemia  atorvastatin (LIPITOR) 10 MG tablet   3. Essential hypertension     4. DDD (degenerative disc disease), cervical     5. Displacement of lumbar intervertebral disc without myelopathy     6. Tobacco use     7.  DAV (generalized anxiety disorder)     8. Current smoker  CT LUNG SCREENING   9. Personal history of tobacco use  [place] VISIT TO DISCUSS LUNG CA SCREEN W LDCT   10. Need for prophylactic vaccination and inoculation against varicella  zoster recombinant adjuvanted vaccine [facility]) 50 MCG/0.5ML SUSR injection             Plan:    Diabetes without long-term current use of insulinpatient has not had an A1c in months. I will order an A1c and lab work. Overall it seems like patient's blood sugars may be going in the right direction with her weight loss although she is not compliant with checking her blood sugars. We will check an A1c and adjust medications as needed. Her A1c is decreased enough we may discontinue the SGLT2 due to the cost.    Pure hyperglyceridemiapatient to get fasting labs done as discussed. Continue on statin. Hypertensionwell-controlled on current medications. No signs or symptoms of stroke. Chronic back and neck painpatient is following with pain management and receiving injections. Encouraged to continue with follow-up. Anxietypatient on Prozac and is doing well. Denies any current concerns or complaints. Patient encouraged to continue medications and follow-up if anxiety worsens or changes. Current smokerpatient given a slip to get CT scan lung cancer screening done. Return in about 6 months (around 10/1/2021) for diabetes. Orders Placed This Encounter   Procedures    CT LUNG SCREENING     Standing Status:   Future     Standing Expiration Date:   7/1/2021     Order Specific Question:   Is there documentation of shared decision making? Answer:   Yes     Order Specific Question:   Does the patient show any signs or symptoms of lung cancer? Answer:   No     Order Specific Question:   Is this the first (baseline) CT or an annual exam?     Answer:   Baseline [1]     Order Specific Question:   Is this a low dose CT or a routine CT?      Answer:   Low Dose CT [1] Order Specific Question:   Smoking Status? Answer:   Current Every Day Smoker [1]     Order Specific Question:   Smoking packs per day? Answer:   1     Order Specific Question:   Years smoking? Answer:   30    Microalbumin, Ur     Standing Status:   Future     Number of Occurrences:   1     Standing Expiration Date:   4/1/2022    Lipid, Fasting     Standing Status:   Future     Standing Expiration Date:   4/1/2022    Comprehensive Metabolic Panel, Fasting     Standing Status:   Future     Standing Expiration Date:   4/1/2022    Hemoglobin A1C     Standing Status:   Future     Standing Expiration Date:   4/1/2022    [place] VISIT TO DISCUSS LUNG CA SCREEN W LDCT     Orders Placed This Encounter   Medications    zoster recombinant adjuvanted vaccine (SHINGRIX) 50 MCG/0.5ML SUSR injection     Sig: Inject 0.5 mLs into the muscle once for 1 dose 50 MCG IM then repeat 2-6 months. Dispense:  1 each     Refill:  1    atorvastatin (LIPITOR) 10 MG tablet     Sig: Take 1 tablet by mouth daily     Dispense:  90 tablet     Refill:  1    metFORMIN (GLUCOPHAGE-XR) 750 MG extended release tablet     Sig: Take 1 tablet by mouth 2 times daily (with meals)     Dispense:  180 tablet     Refill:  1    Semaglutide, 1 MG/DOSE, (OZEMPIC, 1 MG/DOSE,) 2 MG/1.5ML SOPN     Sig: Inject 1 mg into the skin once a week     Dispense:  6 pen     Refill:  1       Patient given educational materials - see patient instructions. Discussed use, benefit, and side effects of prescribed medications. All patientquestions answered. Pt voiced understanding. Reviewed health maintenance. Instructedto continue current medications, diet and exercise. Patient agreed with treatmentplan. Follow up as directed.      Electronically signed by Sheryle Gibes, MD on 4/1/2021 at 7:00 PM  Low Dose CT (LDCT) Lung Screening criteria met   Age 50-69   Pack year smoking >30   Still smoking or less than 15 year since quit   No sign or symptoms of lung cancer > 11 months since last LDCT     Risks and benefits of lung cancer screening with LDCT scans discussed:    Significance of positive screen - False-positive LDCT results often occur. 95% of all positive results do not lead to a diagnosis of cancer. Usually further imaging can resolve most false-positive results; however, some patients may require invasive procedures. Over diagnosis risk - 10% to 12% of screen-detected lung cancer cases are over diagnosedthat is, the cancer would not have been detected in the patient's lifetime without the screening. Need for follow up screens annually to continue lung cancer screening effectiveness     Risks associated with radiation from annual LDCT- Radiation exposure is about the same as for a mammogram, which is about 1/3 of the annual background radiation exposure from everyday life. Starting screening at age 54 is not likely to increase cancer risk from radiation exposure. Patients with comorbidities resulting in life expectancy of < 10 years, or that would preclude treatment of an abnormality identified on CT, should not be screened due to lack of benefit.     To obtain maximal benefit from this screening, smoking cessation and long-term abstinence from smoking is critical

## 2021-04-27 NOTE — TELEPHONE ENCOUNTER
Next Visit Date:  Future Appointments   Date Time Provider Alphonse Acunai   10/4/2021  1:30 PM [facility] [address] Maintenance   Topic Date Due    Diabetic retinal exam  09/23/2011    Colon Cancer Screen FIT/FOBT  04/28/2018    Low dose CT lung screening  Never done    Shingles Vaccine (2 of 2) 12/23/2020    COVID-19 Vaccine (2 - Pfizer 2-dose series) 04/07/2021    Pneumococcal 0-64 years Vaccine (1 of 1 - PPSV23) 09/14/2021 (Originally 3/30/1971)    Hepatitis B vaccine (1 of 3 - Risk 3-dose series) 10/12/2021 (Originally [birth year]/30/1984)    Lipid screen  07/20/2021    Potassium monitoring  07/20/2021    Creatinine monitoring  07/20/2021    Diabetic foot exam  10/12/2021    A1C test (Diabetic or Prediabetic)  10/27/2021    Diabetic microalbuminuria test  04/01/2022    Breast cancer screen  01/30/2023    Cervical cancer screen  09/04/2024    DTaP/Tdap/Td vaccine (2 - Td) 08/28/2025    Flu vaccine  Completed    Hepatitis C screen  Completed    HIV screen  Completed    Hepatitis A vaccine  Aged Out    Hib vaccine  Aged Out    Meningococcal (ACWY) vaccine  Aged Out       Hemoglobin A1C (%)   Date Value   10/27/2020 7.8   07/20/2020 10.1   07/10/2019 8.0             ( goal A1C is < 7)   Microalb/Crt.  Ratio (mcg/mg creat)   Date Value   04/01/2021 CANNOT BE CALCULATED     LDL Calculated (mg/dL)   Date Value   07/20/2020 77   07/10/2019 87       (goal LDL is <100)   AST (U/L)   Date Value   07/20/2020 27     ALT (U/L)   Date Value   07/20/2020 44     BUN (mg/dL)   Date Value   07/20/2020 16     BP Readings from Last 3 Encounters:   04/01/21 130/72   12/25/20 (!) 172/89   10/12/20 (!) 168/88          (goal 120/80)    All Future Testing planned in [facility]  Lab Frequency Next Occurrence   MRI CERVICAL SPINE WO CONTRAST Once 12/31/2020   Lipid, Fasting Once 04/01/2021   Comprehensive Metabolic Panel, Fasting Once 04/01/2021   Hemoglobin A1C Once 04/01/2021   CT LUNG SCREENING

## 2021-04-28 RX ORDER — LOSARTAN POTASSIUM 100 MG/1
TABLET ORAL
Qty: 90 TABLET | Refills: 1 | Status: SHIPPED | OUTPATIENT
Start: 2021-04-28 | End: 2021-09-10 | Stop reason: SDUPTHER

## 2021-06-09 DIAGNOSIS — I10 ESSENTIAL HYPERTENSION: ICD-10-CM

## 2021-06-09 DIAGNOSIS — F41.9 ANXIETY: ICD-10-CM

## 2021-06-09 DIAGNOSIS — E11.9 TYPE 2 DIABETES MELLITUS WITHOUT COMPLICATION, WITHOUT LONG-TERM CURRENT USE OF INSULIN (HCC): ICD-10-CM

## 2021-06-10 RX ORDER — SEMAGLUTIDE 1.34 MG/ML
INJECTION, SOLUTION SUBCUTANEOUS
Qty: 12 ML | Refills: 2 | Status: SHIPPED | OUTPATIENT
Start: 2021-06-10 | End: 2021-09-10 | Stop reason: SDUPTHER

## 2021-06-10 RX ORDER — HYDROCHLOROTHIAZIDE 25 MG/1
TABLET ORAL
Qty: 90 TABLET | Refills: 1 | Status: SHIPPED | OUTPATIENT
Start: 2021-06-10 | End: 2021-11-25

## 2021-06-10 RX ORDER — FLUOXETINE HYDROCHLORIDE 20 MG/1
CAPSULE ORAL
Qty: 90 CAPSULE | Refills: 1 | Status: SHIPPED | OUTPATIENT
Start: 2021-06-10 | End: 2021-10-11 | Stop reason: SDUPTHER

## 2021-06-10 NOTE — TELEPHONE ENCOUNTER
Next Visit Date:  Future Appointments   Date Time Provider Alphonse Nur   10/4/2021  1:30 PM [facility] [address] Maintenance   Topic Date Due    Diabetic retinal exam  09/23/2011    Colon Cancer Screen FIT/FOBT  04/28/2018    Low dose CT lung screening  Never done    Shingles Vaccine (2 of 2) 12/23/2020    COVID-19 Vaccine (2 - Pfizer 2-dose series) 04/07/2021    Pneumococcal 0-64 years Vaccine (1 of 2 - PPSV23) 09/14/2021 (Originally 3/30/1971)    Hepatitis B vaccine (1 of 3 - Risk 3-dose series) 10/12/2021 (Originally [birth year]/30/1984)    Lipid screen  07/20/2021    Potassium monitoring  07/20/2021    Creatinine monitoring  07/20/2021    Diabetic foot exam  10/12/2021    A1C test (Diabetic or Prediabetic)  10/27/2021    Diabetic microalbuminuria test  04/01/2022    Breast cancer screen  01/30/2023    Cervical cancer screen  09/04/2024    DTaP/Tdap/Td vaccine (2 - Td or Tdap) 08/28/2025    Flu vaccine  Completed    Hepatitis C screen  Completed    HIV screen  Completed    Hepatitis A vaccine  Aged Out    Hib vaccine  Aged Out    Meningococcal (ACWY) vaccine  Aged Out       Hemoglobin A1C (%)   Date Value   10/27/2020 7.8   07/20/2020 10.1   07/10/2019 8.0             ( goal A1C is < 7)   Microalb/Crt.  Ratio (mcg/mg creat)   Date Value   04/01/2021 CANNOT BE CALCULATED     LDL Calculated (mg/dL)   Date Value   07/20/2020 77   07/10/2019 87       (goal LDL is <100)   AST (U/L)   Date Value   07/20/2020 27     ALT (U/L)   Date Value   07/20/2020 44     BUN (mg/dL)   Date Value   07/20/2020 16     BP Readings from Last 3 Encounters:   04/01/21 130/72   12/25/20 (!) 172/89   10/12/20 (!) 168/88          (goal 120/80)    All Future Testing planned in [facility]  Lab Frequency Next Occurrence   Lipid, Fasting Once 07/28/2021   Comprehensive Metabolic Panel, Fasting Once 07/28/2021   Hemoglobin A1C Once 07/28/2021   CT LUNG SCREENING Once 06/28/2021               Patient Active Problem List:     Migraine     DDD (degenerative disc disease), cervical     Mild obstructive sleep apnea     DAV (generalized anxiety disorder)     Essential hypertension     Type 2 diabetes mellitus without complication, without long-term current use of insulin ([facility])     Insomnia     Chronic pain     Displacement of lumbar intervertebral disc without myelopathy     Facet syndrome     Acquired spondylolisthesis     Cervical post-laminectomy syndrome     Tobacco use

## 2021-06-17 DIAGNOSIS — I10 ESSENTIAL HYPERTENSION: ICD-10-CM

## 2021-06-17 RX ORDER — AMLODIPINE BESYLATE 5 MG/1
TABLET ORAL
Qty: 30 TABLET | Refills: 2 | Status: SHIPPED | OUTPATIENT
Start: 2021-06-17 | End: 2021-09-20

## 2021-08-29 DIAGNOSIS — E11.9 TYPE 2 DIABETES MELLITUS WITHOUT COMPLICATION, WITHOUT LONG-TERM CURRENT USE OF INSULIN (HCC): ICD-10-CM

## 2021-08-30 NOTE — TELEPHONE ENCOUNTER
Next Visit Date:  Future Appointments   Date Time Provider Alphonse Acunai   10/4/2021  1:30 PM [facility] [address] Maintenance   Topic Date Due    Diabetic retinal exam  09/23/2011    Low dose CT lung screening  Never done    Colon Cancer Screen FIT/FOBT  04/28/2018    Shingles Vaccine (2 of 2) 12/23/2020    COVID-19 Vaccine (2 - Pfizer 2-dose series) 04/07/2021    Lipid screen  07/20/2021    Potassium monitoring  07/20/2021    Creatinine monitoring  07/20/2021    Pneumococcal 0-64 years Vaccine (1 of 2 - PPSV23) 09/14/2021 (Originally 3/30/1971)    Hepatitis B vaccine (1 of 3 - Risk 3-dose series) 10/12/2021 (Originally [birth year]/30/1984)    Flu vaccine (1) 09/01/2021    Diabetic foot exam  10/12/2021    A1C test (Diabetic or Prediabetic)  10/27/2021    Diabetic microalbuminuria test  04/01/2022    Breast cancer screen  01/30/2023    Cervical cancer screen  09/04/2024    DTaP/Tdap/Td vaccine (2 - Td or Tdap) 08/28/2025    Hepatitis C screen  Completed    HIV screen  Completed    Hepatitis A vaccine  Aged Out    Hib vaccine  Aged Out    Meningococcal (ACWY) vaccine  Aged Out       Hemoglobin A1C (%)   Date Value   10/27/2020 7.8   07/20/2020 10.1   07/10/2019 8.0             ( goal A1C is < 7)   Microalb/Crt.  Ratio (mcg/mg creat)   Date Value   04/01/2021 CANNOT BE CALCULATED     LDL Calculated (mg/dL)   Date Value   07/20/2020 77   07/10/2019 87       (goal LDL is <100)   AST (U/L)   Date Value   07/20/2020 27     ALT (U/L)   Date Value   07/20/2020 44     BUN (mg/dL)   Date Value   07/20/2020 16     BP Readings from Last 3 Encounters:   04/01/21 130/72   12/25/20 (!) 172/89   10/12/20 (!) 168/88          (goal 120/80)    All Future Testing planned in [facility]  Lab Frequency Next Occurrence   Lipid, Fasting Once 07/28/2021   Comprehensive Metabolic Panel, Fasting Once 07/28/2021   Hemoglobin A1C Once 07/28/2021               Patient Active Problem List:     Migraine DDD (degenerative disc disease), cervical     Mild obstructive sleep apnea     DAV (generalized anxiety disorder)     Essential hypertension     Type 2 diabetes mellitus without complication, without long-term current use of insulin ([facility])     Insomnia     Chronic pain     Displacement of lumbar intervertebral disc without myelopathy     Facet syndrome     Acquired spondylolisthesis     Cervical post-laminectomy syndrome     Tobacco use

## 2021-09-01 RX ORDER — METFORMIN HYDROCHLORIDE 750 MG/1
TABLET, EXTENDED RELEASE ORAL
Qty: 180 TABLET | Refills: 1 | Status: SHIPPED | OUTPATIENT
Start: 2021-09-01 | End: 2022-03-07 | Stop reason: ALTCHOICE

## 2021-09-10 DIAGNOSIS — E11.9 TYPE 2 DIABETES MELLITUS WITHOUT COMPLICATION, WITHOUT LONG-TERM CURRENT USE OF INSULIN (HCC): ICD-10-CM

## 2021-09-12 RX ORDER — SEMAGLUTIDE 1.34 MG/ML
1 INJECTION, SOLUTION SUBCUTANEOUS WEEKLY
Qty: 12 ML | Refills: 2 | Status: SHIPPED | OUTPATIENT
Start: 2021-09-12 | End: 2022-06-07

## 2021-09-12 RX ORDER — LOSARTAN POTASSIUM 100 MG/1
100 TABLET ORAL DAILY
Qty: 90 TABLET | Refills: 1 | Status: SHIPPED | OUTPATIENT
Start: 2021-09-12 | End: 2022-03-31 | Stop reason: SDUPTHER

## 2021-09-20 DIAGNOSIS — I10 ESSENTIAL HYPERTENSION: ICD-10-CM

## 2021-09-20 RX ORDER — AMLODIPINE BESYLATE 5 MG/1
TABLET ORAL
Qty: 30 TABLET | Refills: 2 | Status: SHIPPED | OUTPATIENT
Start: 2021-09-20 | End: 2021-10-11 | Stop reason: DRUGHIGH

## 2021-09-20 NOTE — TELEPHONE ENCOUNTER
Electronic medication refill request. Pharmacy on file. Please advise. Next Visit Date:  Future Appointments   Date Time Provider Alphonse Acunai   10/4/2021  1:30 PM [facility] [address]   Topic Date Due    Pneumococcal 0-64 years Vaccine (1 of 2 - PPSV23) Never done    Diabetic retinal exam  09/23/2011    Low dose CT lung screening  Never done    Colon Cancer Screen FIT/FOBT  04/28/2018    Shingles Vaccine (2 of 2) 12/23/2020    COVID-19 Vaccine (2 - Pfizer 2-dose series) 04/07/2021    Lipid screen  07/20/2021    Potassium monitoring  07/20/2021    Creatinine monitoring  07/20/2021    Flu vaccine (1) 09/01/2021    Hepatitis B vaccine (1 of 3 - Risk 3-dose series) 10/12/2021 (Originally 3/30/1984)    Diabetic foot exam  10/12/2021    A1C test (Diabetic or Prediabetic)  10/27/2021    Diabetic microalbuminuria test  04/01/2022    Breast cancer screen  01/30/2023    Cervical cancer screen  09/04/2024    DTaP/Tdap/Td vaccine (2 - Td or Tdap) 08/28/2025    Hepatitis C screen  Completed    HIV screen  Completed    Hepatitis A vaccine  Aged Out    Hib vaccine  Aged Out    Meningococcal (ACWY) vaccine  Aged Out       Hemoglobin A1C (%)   Date Value   10/27/2020 7.8   07/20/2020 10.1   07/10/2019 8.0             ( goal A1C is < 7)   Microalb/Crt.  Ratio (mcg/mg creat)   Date Value   04/01/2021 CANNOT BE CALCULATED     LDL Calculated (mg/dL)   Date Value   07/20/2020 77   07/10/2019 87       (goal LDL is <100)   AST (U/L)   Date Value   07/20/2020 27     ALT (U/L)   Date Value   07/20/2020 44     BUN (mg/dL)   Date Value   07/20/2020 16     BP Readings from Last 3 Encounters:   04/01/21 130/72   12/25/20 (!) 172/89   10/12/20 (!) 168/88          (goal 120/80)    All Future Testing planned in [facility]  Lab Frequency Next Occurrence   Lipid, Fasting Once 07/28/2021   Comprehensive Metabolic Panel, Fasting Once 07/28/2021   Hemoglobin A1C Once 07/28/2021 Patient Active Problem List:     Migraine     DDD (degenerative disc disease), cervical     Mild obstructive sleep apnea     DAV (generalized anxiety disorder)     Essential hypertension     Type 2 diabetes mellitus without complication, without long-term current use of insulin ([facility])     Insomnia     Chronic pain     Displacement of lumbar intervertebral disc without myelopathy     Facet syndrome     Acquired spondylolisthesis     Cervical post-laminectomy syndrome     Tobacco use

## 2021-09-24 ENCOUNTER — TELEPHONE (OUTPATIENT)
Dept: FAMILY MEDICINE CLINIC | Age: 56
End: 2021-09-24

## 2021-09-24 DIAGNOSIS — F51.01 PRIMARY INSOMNIA: Primary | ICD-10-CM

## 2021-09-24 RX ORDER — TRAZODONE HYDROCHLORIDE 100 MG/1
100 TABLET ORAL NIGHTLY
Qty: 30 TABLET | Refills: 1 | Status: CANCELLED | OUTPATIENT
Start: 2021-09-24

## 2021-09-24 RX ORDER — TRAZODONE HYDROCHLORIDE 50 MG/1
50 TABLET ORAL NIGHTLY PRN
Qty: 30 TABLET | Refills: 1 | Status: SHIPPED | OUTPATIENT
Start: 2021-09-24 | End: 2021-10-11 | Stop reason: ALTCHOICE

## 2021-09-24 NOTE — TELEPHONE ENCOUNTER
I called patient to reschedule her an appt and she had a lot going on she is going through a divorce and moving ect and she is having trouble sleeping, her brother did give her a trazodone that he had and it did help so she is wanting to know  if you can call that in for her

## 2021-10-09 ENCOUNTER — HOSPITAL ENCOUNTER (EMERGENCY)
Age: 56
Discharge: LEFT BEFORE DISCHARGE | End: 2021-10-09
Payer: COMMERCIAL

## 2021-10-09 VITALS
OXYGEN SATURATION: 98 % | HEART RATE: 94 BPM | DIASTOLIC BLOOD PRESSURE: 94 MMHG | SYSTOLIC BLOOD PRESSURE: 167 MMHG | RESPIRATION RATE: 18 BRPM | TEMPERATURE: 98.3 F

## 2021-10-09 LAB — GLUCOSE BLD-MCNC: 141 MG/DL (ref 65–105)

## 2021-10-09 PROCEDURE — 93005 ELECTROCARDIOGRAM TRACING: CPT | Performed by: FAMILY MEDICINE

## 2021-10-09 PROCEDURE — 82947 ASSAY GLUCOSE BLOOD QUANT: CPT

## 2021-10-09 PROCEDURE — 4500000002 HC ER NO CHARGE
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
## 2021-10-11 ENCOUNTER — TELEPHONE (OUTPATIENT)
Dept: FAMILY MEDICINE CLINIC | Age: 56
End: 2021-10-11

## 2021-10-11 DIAGNOSIS — I10 UNCONTROLLED HYPERTENSION: ICD-10-CM

## 2021-10-11 DIAGNOSIS — I10 ESSENTIAL HYPERTENSION: Primary | ICD-10-CM

## 2021-10-11 DIAGNOSIS — I10 ESSENTIAL HYPERTENSION: ICD-10-CM

## 2021-10-11 DIAGNOSIS — F41.9 ANXIETY: ICD-10-CM

## 2021-10-11 DIAGNOSIS — R35.0 URINARY FREQUENCY: ICD-10-CM

## 2021-10-11 DIAGNOSIS — E11.9 TYPE 2 DIABETES MELLITUS WITHOUT COMPLICATION, WITHOUT LONG-TERM CURRENT USE OF INSULIN (HCC): Primary | ICD-10-CM

## 2021-10-11 DIAGNOSIS — F11.90 CHRONIC, CONTINUOUS USE OF OPIOIDS: ICD-10-CM

## 2021-10-11 LAB
EKG ATRIAL RATE: 94 BPM
EKG P AXIS: 58 DEGREES
EKG P-R INTERVAL: 132 MS
EKG Q-T INTERVAL: 364 MS
EKG QRS DURATION: 76 MS
EKG QTC CALCULATION (BAZETT): 455 MS
EKG R AXIS: 59 DEGREES
EKG T AXIS: 53 DEGREES
EKG VENTRICULAR RATE: 94 BPM

## 2021-10-11 PROCEDURE — 93010 ELECTROCARDIOGRAM REPORT: CPT | Performed by: INTERNAL MEDICINE

## 2021-10-11 RX ORDER — AMLODIPINE BESYLATE 10 MG/1
10 TABLET ORAL DAILY
Qty: 90 TABLET | Refills: 1 | Status: SHIPPED | OUTPATIENT
Start: 2021-10-11

## 2021-10-11 RX ORDER — FLUOXETINE HYDROCHLORIDE 40 MG/1
CAPSULE ORAL
Qty: 90 CAPSULE | Refills: 1 | Status: SHIPPED | OUTPATIENT
Start: 2021-10-11

## 2021-10-11 RX ORDER — METOPROLOL SUCCINATE 25 MG/1
25 TABLET, EXTENDED RELEASE ORAL DAILY
Qty: 30 TABLET | Refills: 5 | Status: SHIPPED | OUTPATIENT
Start: 2021-10-11 | End: 2021-11-04

## 2021-10-11 RX ORDER — AMLODIPINE BESYLATE 10 MG/1
10 TABLET ORAL DAILY
Qty: 90 TABLET | Refills: 1 | Status: CANCELLED | OUTPATIENT
Start: 2021-10-11

## 2021-10-11 RX ORDER — LORAZEPAM 0.5 MG/1
0.5 TABLET ORAL 2 TIMES DAILY PRN
Qty: 30 TABLET | Refills: 0 | Status: SHIPPED | OUTPATIENT
Start: 2021-10-11 | End: 2021-11-09 | Stop reason: SDUPTHER

## 2021-10-11 RX ORDER — NALOXONE HYDROCHLORIDE 0.4 MG/ML
0.4 INJECTION, SOLUTION INTRAMUSCULAR; INTRAVENOUS; SUBCUTANEOUS PRN
Qty: 1 ML | Refills: 0 | Status: SHIPPED | OUTPATIENT
Start: 2021-10-11 | End: 2022-06-07

## 2021-10-11 RX ORDER — METOPROLOL SUCCINATE 25 MG/1
25 TABLET, EXTENDED RELEASE ORAL DAILY
Qty: 30 TABLET | Refills: 0 | Status: CANCELLED | OUTPATIENT
Start: 2021-10-11

## 2021-10-11 NOTE — TELEPHONE ENCOUNTER
Pharmacy called to request a different prescription for patient's Naloxone. Pharmacy states they do not have the injection in stock, but do have a nasal spray. Please advise, thank you!

## 2021-10-11 NOTE — TELEPHONE ENCOUNTER
Spoke with dr Elvira Carter and we switched it to the nasal spray spray one time into nostril every two to three min until the ems arrives alteranting nostrils called in verbally to the pharmacy

## 2021-10-12 ENCOUNTER — HOSPITAL ENCOUNTER (OUTPATIENT)
Age: 56
Discharge: HOME | End: 2021-10-12
Payer: COMMERCIAL

## 2021-10-12 DIAGNOSIS — E11.9 TYPE 2 DIABETES MELLITUS WITHOUT COMPLICATION, WITHOUT LONG-TERM CURRENT USE OF INSULIN (HCC): ICD-10-CM

## 2021-10-12 DIAGNOSIS — I10 UNCONTROLLED HYPERTENSION: ICD-10-CM

## 2021-10-12 DIAGNOSIS — R35.0 URINARY FREQUENCY: ICD-10-CM

## 2021-10-12 LAB
-: NORMAL
ABSOLUTE EOS #: 0.12 K/UL (ref 0–0.44)
ABSOLUTE IMMATURE GRANULOCYTE: 0.03 K/UL (ref 0–0.3)
ABSOLUTE LYMPH #: 2.68 K/UL (ref 1.1–3.7)
ABSOLUTE MONO #: 0.66 K/UL (ref 0.1–1.2)
ALBUMIN SERPL-MCNC: 5.3 G/DL (ref 3.5–5.2)
ALBUMIN/GLOBULIN RATIO: 2.2 (ref 1–2.5)
ALP BLD-CCNC: 84 U/L (ref 35–104)
ALT SERPL-CCNC: 25 U/L (ref 5–33)
AMORPHOUS: NORMAL
ANION GAP SERPL CALCULATED.3IONS-SCNC: 15 MMOL/L (ref 9–17)
AST SERPL-CCNC: 16 U/L
BACTERIA: NORMAL
BASOPHILS # BLD: 1 % (ref 0–2)
BASOPHILS ABSOLUTE: 0.1 K/UL (ref 0–0.2)
BILIRUB SERPL-MCNC: 0.58 MG/DL (ref 0.3–1.2)
BILIRUBIN URINE: NEGATIVE
BUN BLDV-MCNC: 16 MG/DL (ref 6–20)
BUN/CREAT BLD: ABNORMAL (ref 9–20)
CALCIUM SERPL-MCNC: 10.3 MG/DL (ref 8.6–10.4)
CASTS UA: NORMAL /LPF (ref 0–8)
CHLORIDE BLD-SCNC: 97 MMOL/L (ref 98–107)
CHOLESTEROL, FASTING: 180 MG/DL
CHOLESTEROL/HDL RATIO: 3.8
CO2: 28 MMOL/L (ref 20–31)
COLOR: YELLOW
COMMENT UA: ABNORMAL
CREAT SERPL-MCNC: 0.57 MG/DL (ref 0.5–0.9)
CREATININE URINE: 37.8 MG/DL (ref 28–217)
CRYSTALS, UA: NORMAL /HPF
DIFFERENTIAL TYPE: ABNORMAL
EOSINOPHILS RELATIVE PERCENT: 1 % (ref 1–4)
EPITHELIAL CELLS UA: NORMAL /HPF (ref 0–5)
ESTIMATED AVERAGE GLUCOSE: 134 MG/DL
GFR AFRICAN AMERICAN: >60 ML/MIN
GFR NON-AFRICAN AMERICAN: >60 ML/MIN
GFR SERPL CREATININE-BSD FRML MDRD: ABNORMAL ML/MIN/{1.73_M2}
GFR SERPL CREATININE-BSD FRML MDRD: ABNORMAL ML/MIN/{1.73_M2}
GLUCOSE FASTING: 122 MG/DL (ref 70–99)
GLUCOSE URINE: NEGATIVE
HBA1C MFR BLD: 6.3 % (ref 4–6)
HCT VFR BLD CALC: 44.5 % (ref 36.3–47.1)
HDLC SERPL-MCNC: 47 MG/DL
HEMOGLOBIN: 14.9 G/DL (ref 11.9–15.1)
IMMATURE GRANULOCYTES: 0 %
KETONES, URINE: NEGATIVE
LDL CHOLESTEROL: 114 MG/DL (ref 0–130)
LEUKOCYTE ESTERASE, URINE: ABNORMAL
LYMPHOCYTES # BLD: 25 % (ref 24–43)
MCH RBC QN AUTO: 31.4 PG (ref 25.2–33.5)
MCHC RBC AUTO-ENTMCNC: 33.5 G/DL (ref 28.4–34.8)
MCV RBC AUTO: 93.9 FL (ref 82.6–102.9)
MICROALBUMIN/CREAT 24H UR: <12 MG/L
MICROALBUMIN/CREAT UR-RTO: NORMAL MCG/MG CREAT
MONOCYTES # BLD: 6 % (ref 3–12)
MUCUS: NORMAL
NITRITE, URINE: NEGATIVE
NRBC AUTOMATED: 0 PER 100 WBC
OTHER OBSERVATIONS UA: NORMAL
PDW BLD-RTO: 11.4 % (ref 11.8–14.4)
PH UA: 7 (ref 5–8)
PLATELET # BLD: 396 K/UL (ref 138–453)
PLATELET ESTIMATE: ABNORMAL
PMV BLD AUTO: 9.6 FL (ref 8.1–13.5)
POTASSIUM SERPL-SCNC: 3.9 MMOL/L (ref 3.7–5.3)
PROTEIN UA: NEGATIVE
RBC # BLD: 4.74 M/UL (ref 3.95–5.11)
RBC # BLD: ABNORMAL 10*6/UL
RBC UA: NORMAL /HPF (ref 0–4)
RENAL EPITHELIAL, UA: NORMAL /HPF
SEG NEUTROPHILS: 67 % (ref 36–65)
SEGMENTED NEUTROPHILS ABSOLUTE COUNT: 7.09 K/UL (ref 1.5–8.1)
SODIUM BLD-SCNC: 140 MMOL/L (ref 135–144)
SPECIFIC GRAVITY UA: 1.01 (ref 1–1.03)
TOTAL PROTEIN: 7.7 G/DL (ref 6.4–8.3)
TRICHOMONAS: NORMAL
TRIGLYCERIDE, FASTING: 93 MG/DL
TSH SERPL DL<=0.05 MIU/L-ACNC: 1.98 MIU/L (ref 0.3–5)
TURBIDITY: CLEAR
URINE HGB: ABNORMAL
UROBILINOGEN, URINE: NORMAL
VLDLC SERPL CALC-MCNC: NORMAL MG/DL (ref 1–30)
WBC # BLD: 10.7 K/UL (ref 3.5–11.3)
WBC # BLD: ABNORMAL 10*3/UL
WBC UA: NORMAL /HPF (ref 0–5)
YEAST: NORMAL

## 2021-10-12 PROCEDURE — 81001 URINALYSIS AUTO W/SCOPE: CPT

## 2021-10-12 PROCEDURE — 84443 ASSAY THYROID STIM HORMONE: CPT

## 2021-10-12 PROCEDURE — 80053 COMPREHEN METABOLIC PANEL: CPT

## 2021-10-12 PROCEDURE — 83835 ASSAY OF METANEPHRINES: CPT

## 2021-10-12 PROCEDURE — 82043 UR ALBUMIN QUANTITATIVE: CPT

## 2021-10-12 PROCEDURE — 36415 COLL VENOUS BLD VENIPUNCTURE: CPT

## 2021-10-12 PROCEDURE — 85025 COMPLETE CBC W/AUTO DIFF WBC: CPT

## 2021-10-12 PROCEDURE — 82570 ASSAY OF URINE CREATININE: CPT

## 2021-10-12 PROCEDURE — 80061 LIPID PANEL: CPT

## 2021-10-12 PROCEDURE — 83036 HEMOGLOBIN GLYCOSYLATED A1C: CPT

## 2021-10-18 ENCOUNTER — TELEPHONE (OUTPATIENT)
Dept: FAMILY MEDICINE CLINIC | Age: 56
End: 2021-10-18

## 2021-10-18 DIAGNOSIS — R31.21 ASYMPTOMATIC MICROSCOPIC HEMATURIA: Primary | ICD-10-CM

## 2021-10-18 DIAGNOSIS — I10 UNCONTROLLED HYPERTENSION: ICD-10-CM

## 2021-11-01 ENCOUNTER — NURSE TRIAGE (OUTPATIENT)
Dept: OTHER | Facility: CLINIC | Age: 56
End: 2021-11-01

## 2021-11-01 ENCOUNTER — TELEPHONE (OUTPATIENT)
Dept: FAMILY MEDICINE CLINIC | Age: 56
End: 2021-11-01
Payer: COMMERCIAL

## 2021-11-01 DIAGNOSIS — R30.0 DYSURIA: Primary | ICD-10-CM

## 2021-11-01 PROCEDURE — 81003 URINALYSIS AUTO W/O SCOPE: CPT | Performed by: FAMILY MEDICINE

## 2021-11-01 RX ORDER — SULFAMETHOXAZOLE AND TRIMETHOPRIM 800; 160 MG/1; MG/1
1 TABLET ORAL 2 TIMES DAILY
Qty: 10 TABLET | Refills: 0 | Status: SHIPPED | OUTPATIENT
Start: 2021-11-01 | End: 2021-11-06

## 2021-11-01 NOTE — TELEPHONE ENCOUNTER
Reason for Disposition   [1] MODERATE back pain (e.g., interferes with normal activities) AND [2] present > 3 days    Answer Assessment - Initial Assessment Questions  1. ONSET: \"When did the pain begin? \"       Has chronic back pain this pain has started x3-4 days ago    2. LOCATION: \"Where does it hurt? \" (upper, mid or lower back)      Mid    3. SEVERITY: \"How bad is the pain? \"  (e.g., Scale 1-10; mild, moderate, or severe)    - MILD (1-3): doesn't interfere with normal activities     - MODERATE (4-7): interferes with normal activities or awakens from sleep     - SEVERE (8-10): excruciating pain, unable to do any normal activities       6/10    4. PATTERN: \"Is the pain constant? \" (e.g., yes, no; constant, intermittent)       Constant    5. RADIATION: \"Does the pain shoot into your legs or elsewhere? \"      Denies    6. CAUSE:  \"What do you think is causing the back pain? \"       Has chronic back pain but unsure if this pain is related to ongoing issues    7. BACK OVERUSE:  Samuel Parker recent lifting of heavy objects, strenuous work or exercise? \"      Denies    8. MEDICATIONS: \"What have you taken so far for the pain? \" (e.g., nothing, acetaminophen, NSAIDS)      Patient has been taking OTC medications for urinary symptoms and Advil for pain    9. NEUROLOGIC SYMPTOMS: \"Do you have any weakness, numbness, or problems with bowel/bladder control? \"     Being treated for UTI    10. OTHER SYMPTOMS: \"Do you have any other symptoms? \" (e.g., fever, abdominal pain, burning with urination, blood in urine)        Burning with urination, nausea    11. PREGNANCY: \"Is there any chance you are pregnant? \" (e.g., yes, no; LMP)        n/a    Protocols used: BACK PAIN-ADULT-AH    Received call from [facility] with [employer].     Brief description of triage: See above note    Triage indicates for patient to: Patient to be seen within the next 2-3 days, Patient reports she already spoke with her PCP about urine symptoms and they called Bactrim in. Patient reports if Bactrim doesn't improve symptoms then she will call back to get scheduled in recommended time frame. Patient also notes she has an Appt with Pain management and urology this week. Care advice provided, patient verbalizes understanding; denies any other questions or concerns; instructed to call back for any new or worsening symptoms. Attention Provider: Thank you for allowing me to participate in the care of your patient. The patient was connected to triage in response to information provided to the ECC/PSC. Please do not respond through this encounter as the response is not directed to a shared pool.

## 2021-11-01 NOTE — TELEPHONE ENCOUNTER
Patient states that she has a UTI ,she has urgency with hardly no urine passing. She would like bactrim called in or should she come in to drop a sample?

## 2021-11-02 ENCOUNTER — HOSPITAL ENCOUNTER (OUTPATIENT)
Age: 56
Setting detail: SPECIMEN
Discharge: HOME | End: 2021-11-02
Payer: COMMERCIAL

## 2021-11-02 DIAGNOSIS — I10 ESSENTIAL HYPERTENSION: ICD-10-CM

## 2021-11-02 NOTE — TELEPHONE ENCOUNTER
Electronic medication refill request. Pharmacy on file. Please advise. Next Visit Date:  Future Appointments   Date Time Provider Alphonse Acunai   11/10/2021 10:45 AM [facility] [address] Maintenance   Topic Date Due    Pneumococcal 0-64 years Vaccine (1 of 2 - PPSV23) Never done    Hepatitis B vaccine (1 of 3 - Risk 3-dose series) Never done    Diabetic retinal exam  09/23/2011    Low dose CT lung screening  Never done    Colon Cancer Screen FIT/FOBT  04/28/2018    Shingles Vaccine (2 of 2) 12/23/2020    COVID-19 Vaccine (2 - Pfizer 3-dose booster series) 04/07/2021    Flu vaccine (1) 09/01/2021    Diabetic foot exam  10/12/2021    A1C test (Diabetic or Prediabetic)  10/12/2022    Diabetic microalbuminuria test  10/12/2022    Lipid screen  10/12/2022    Potassium monitoring  10/12/2022    Creatinine monitoring  10/12/2022    Breast cancer screen  01/30/2023    Cervical cancer screen  09/04/2024    DTaP/Tdap/Td vaccine (2 - Td or Tdap) 08/28/2025    Hepatitis C screen  Completed    HIV screen  Completed    Hepatitis A vaccine  Aged Out    Hib vaccine  Aged Out    Meningococcal (ACWY) vaccine  Aged Out       Hemoglobin A1C (%)   Date Value   10/12/2021 6.3 (H)   10/27/2020 7.8   07/20/2020 10.1             ( goal A1C is < 7)   Microalb/Crt.  Ratio (mcg/mg creat)   Date Value   10/12/2021 CANNOT BE CALCULATED     LDL Cholesterol (mg/dL)   Date Value   10/12/2021 114     LDL Calculated (mg/dL)   Date Value   07/20/2020 77   07/10/2019 87       (goal LDL is <100)   AST (U/L)   Date Value   10/12/2021 16     ALT (U/L)   Date Value   10/12/2021 25     BUN (mg/dL)   Date Value   10/12/2021 16     BP Readings from Last 3 Encounters:   10/09/21 (!) 167/94   04/01/21 130/72   12/25/20 (!) 172/89          (goal 120/80)    All Future Testing planned in [facility]  Lab Frequency Next Occurrence   Comprehensive Metabolic Panel Once 19/36/4645   Lipid Panel Once 10/11/2021

## 2021-11-03 LAB
CULTURE: ABNORMAL
Lab: ABNORMAL
SPECIMEN DESCRIPTION: ABNORMAL

## 2021-11-04 RX ORDER — METOPROLOL SUCCINATE 25 MG/1
TABLET, EXTENDED RELEASE ORAL
Qty: 30 TABLET | Refills: 5 | Status: SHIPPED | OUTPATIENT
Start: 2021-11-04 | End: 2022-03-21 | Stop reason: SDUPTHER

## 2021-11-08 ENCOUNTER — TELEPHONE (OUTPATIENT)
Dept: FAMILY MEDICINE CLINIC | Age: 56
End: 2021-11-08

## 2021-11-08 NOTE — TELEPHONE ENCOUNTER
She has a discs bulging in both thoracic and lumbar areas. She needs to call her pain management doctor to see if they can inject and consider seeing neurosurgery to discuss surgical options as well for treatment.  Who has she seen in the past?

## 2021-11-08 NOTE — TELEPHONE ENCOUNTER
Patient called in she went to er on Friday after having really bad flank pain, they gave her Toradol and and antibiotic while there did MRI;s but didn't go over them to in depth or she just doesn't recall what they said so she is wanting to know if you can look at them and also the meds helped for a few days then the pain is just coming back she didn't know if she could get another shot or Toradol or a steroid or if she should call her pain dr please advise

## 2021-11-10 ENCOUNTER — TELEMEDICINE (OUTPATIENT)
Dept: FAMILY MEDICINE CLINIC | Age: 56
End: 2021-11-10
Payer: COMMERCIAL

## 2021-11-10 DIAGNOSIS — M48.061 SPINAL STENOSIS AT L4-L5 LEVEL: ICD-10-CM

## 2021-11-10 DIAGNOSIS — M51.34 BULGING OF THORACIC INTERVERTEBRAL DISC: Primary | ICD-10-CM

## 2021-11-10 PROCEDURE — 99213 OFFICE O/P EST LOW 20 MIN: CPT | Performed by: FAMILY MEDICINE

## 2021-11-10 PROCEDURE — G8427 DOCREV CUR MEDS BY ELIG CLIN: HCPCS | Performed by: FAMILY MEDICINE

## 2021-11-10 PROCEDURE — 3017F COLORECTAL CA SCREEN DOC REV: CPT | Performed by: FAMILY MEDICINE

## 2021-11-10 RX ORDER — GABAPENTIN 100 MG/1
100 CAPSULE ORAL 3 TIMES DAILY
COMMUNITY
Start: 2021-11-09

## 2021-11-10 RX ORDER — MELOXICAM 7.5 MG/1
7.5 TABLET ORAL 2 TIMES DAILY
Qty: 60 TABLET | Refills: 0 | Status: SHIPPED | OUTPATIENT
Start: 2021-11-10 | End: 2021-12-22 | Stop reason: SDUPTHER

## 2021-11-10 RX ORDER — MORPHINE SULFATE 15 MG/1
15 TABLET, FILM COATED, EXTENDED RELEASE ORAL 3 TIMES DAILY
Qty: 30 TABLET | Refills: 0
Start: 2021-11-10 | End: 2021-12-10

## 2021-11-10 ASSESSMENT — ENCOUNTER SYMPTOMS
BACK PAIN: 1
RESPIRATORY NEGATIVE: 1

## 2021-11-10 NOTE — PROGRESS NOTES
Urvashi Wolf (:  1965) is a 64 y.o. female,Established patient, here for evaluation of the following chief complaint(s): Diabetes and Results (mri done at [facility])         ASSESSMENT/PLAN:  1. Bulging of thoracic intervertebral disc  -     morphine (MS CONTIN) 15 MG extended release tablet; Take 1 tablet by mouth 3 times daily for 30 days. , Disp-30 tablet, R-0NO PRINT  -     meloxicam (MOBIC) 7.5 MG tablet; Take 1 tablet by mouth 2 times daily, Disp-60 tablet, R-0Normal  -     External Referral To Neurosurgery  2. Spinal stenosis at L4-L5 level  -     morphine (MS CONTIN) 15 MG extended release tablet; Take 1 tablet by mouth 3 times daily for 30 days. , Disp-30 tablet, R-0NO PRINT  -     meloxicam (MOBIC) 7.5 MG tablet; Take 1 tablet by mouth 2 times daily, Disp-60 tablet, R-0Normal  -     External Referral To Neurosurgery  Bulging thoracic as well as lumbar disc, spinal stenosis at multiple levels-patient encouraged to make appointment with her neurosurgeon that she had an appointment with and establish care in the past.  Patient to take gabapentin, she has not picked this up yet. We will have her follow-up with her pain management to continue on the morphine, muscle relaxers. Advised her to go from taking the Mobic every other day to twice a day. Advised her to monitor for any symptoms of ulcers or GI bleeding while on that medication. Patient has had physical therapy in the past which worsened her symptoms so she declined at this time. Will defer management to neurosurgery. No follow-ups on file. SUBJECTIVE/OBJECTIVE:  The patient is a 64year old female who presents with consistent thoracic back pain and lumbar back pain. She went to [facility] ER where they initially ordered a CT scan of the abdomen/pelvis which did not reveal any signs of infection, acute abnormalities.   She had been mentioning significant incontinence in the ER so she had MRI done to assess for spinal cord compromise. They gave her IV muscle relaxer, IV Toradol and a dose of antibiotics. The patient states initially she had some slight improvement. The pain is described as a stabbing/burning pain on the right side in the middle of her back. Initially it was positional in nature prior to her ER visit where when she moved the pain would increase with certain movements. Now she will have the pain with almost any movement. The patient has consistently been taking her muscle relaxer at night, her Mobic every other day and then has morphine extended release to her pain management physician. The morphine dose was recently increased to 50 mg 3 times a day and this has not been helping at all. The worst pain is in the mid back on the right. She has seen Dr. Federico Gutierrez, neurosurgery, in the past and he was advising her to get surgery. She declined at that time and followed up with pain management. She is gotten multiple epidural injections in the past.      Review of Systems   Constitutional: Negative. Respiratory: Negative. Cardiovascular: Negative. Genitourinary: Negative. Musculoskeletal: Positive for arthralgias, back pain and gait problem. Allergic/Immunologic: Negative for environmental allergies, food allergies and immunocompromised state. Neurological: Positive for numbness. Patient-Reported Vitals 9/28/2020   Patient-Reported Weight 155   Patient-Reported Height 5\"   Patient-Reported Systolic 322   Patient-Reported Diastolic 47   Patient-Reported Pulse 88        Physical Exam  Constitutional:       General: She is not in acute distress. Appearance: Normal appearance. She is not ill-appearing, toxic-appearing or diaphoretic. HENT:      Head: Normocephalic and atraumatic. Right Ear: External ear normal.      Left Ear: External ear normal.      Nose: Nose normal.   Eyes:      Extraocular Movements: Extraocular movements intact.       Conjunctiva/sclera: Conjunctivae normal. Pulmonary:      Effort: Pulmonary effort is normal.   Neurological:      General: No focal deficit present. Mental Status: She is alert and oriented to person, place, and time. Mental status is at baseline. Psychiatric:         Mood and Affect: Mood normal.         Behavior: Behavior normal.         Thought Content:  Thought content normal.         Judgment: Judgment normal.         [INSTRUCTIONS:  \"[x]\" Indicates a positive item  \"[]\" Indicates a negative item  -- DELETE ALL ITEMS NOT EXAMINED]    Constitutional: [x] Appears well-developed and well-nourished [x] No apparent distress      [] Abnormal -     Mental status: [x] Alert and awake  [x] Oriented to person/place/time [x] Able to follow commands    [] Abnormal -     Eyes:   EOM    [x]  Normal    [] Abnormal -   Sclera  [x]  Normal    [] Abnormal -          Discharge [x]  None visible   [] Abnormal -     HENT: [x] Normocephalic, atraumatic  [] Abnormal -   [x] Mouth/Throat: Mucous membranes are moist    External Ears [x] Normal  [] Abnormal -    Neck: [x] No visualized mass [] Abnormal -     Pulmonary/Chest: [x] Respiratory effort normal   [x] No visualized signs of difficulty breathing or respiratory distress        [] Abnormal -      Musculoskeletal:   [] Normal gait with no signs of ataxia         [x] Normal range of motion of neck        [] Abnormal -     Neurological:        [x] No Facial Asymmetry (Cranial nerve 7 motor function) (limited exam due to video visit)          [x] No gaze palsy        [] Abnormal -          Skin:        [x] No significant exanthematous lesions or discoloration noted on facial skin         [] Abnormal -            Psychiatric:       [x] Normal Affect [] Abnormal -        [x] No Hallucinations    Other pertinent observable physical exam findings:-          On this date 11/10/2021 I have spent 25 minutes reviewing previous notes, test results and face to face (virtual) with the patient discussing the diagnosis and importance of compliance with the treatment plan as well as documenting on the day of the visit. Urvashi DONELL RojasLuciano, was evaluated through a synchronous (real-time) audio-video encounter. The patient (or guardian if applicable) is aware that this is a billable service. Verbal consent to proceed has been obtained within the past 12 months. The visit was conducted pursuant to the emergency declaration under the [address] [place] [address] and the [employer] and [employer] General Act. Patient identification was verified, and a caregiver was present when appropriate. The patient was located in a state where the provider was credentialed to provide care. An electronic signature was used to authenticate this note.     --Marylene Pfeiffer, MD

## 2021-11-10 NOTE — Clinical Note
Please fax the referral to Dr. Pascual Boles for the patient along with her MRI from [facility] 11/5/2021. Also please fax patient's pain management notes too.

## 2021-11-12 LAB
CREATININE URINE /24 HR: NORMAL MG/D (ref 500–1400)
CREATININE URINE /VOLUME: 34 MG/DL
HOURS COLLECTED: NORMAL
METANEPHRINE UF INTERPRETATION: NORMAL
METANEPHRINE UG/G CRE: 150 UG/G CRT (ref 0–300)
METANEPHRINES 24 HOUR URINE: NORMAL UG/D (ref 36–229)
METANEPHRINES, URINE (UMOL/L): 51 UG/L
NORMETANEPHRINE, (G/CRT): 341 UG/G CRT (ref 0–400)
NORMETANEPHRINE, (NMOL/DAY): NORMAL UG/D (ref 95–650)
NORMETANEPHRINES, NMOL/L: 116 UG/L
URINE VOLUME: NORMAL

## 2021-11-25 DIAGNOSIS — I10 ESSENTIAL HYPERTENSION: ICD-10-CM

## 2021-11-25 RX ORDER — HYDROCHLOROTHIAZIDE 25 MG/1
TABLET ORAL
Qty: 90 TABLET | Refills: 1 | Status: SHIPPED | OUTPATIENT
Start: 2021-11-25 | End: 2022-03-21 | Stop reason: SDUPTHER

## 2021-12-22 DIAGNOSIS — M51.34 BULGING OF THORACIC INTERVERTEBRAL DISC: ICD-10-CM

## 2021-12-22 DIAGNOSIS — M48.061 SPINAL STENOSIS AT L4-L5 LEVEL: ICD-10-CM

## 2021-12-22 RX ORDER — MELOXICAM 7.5 MG/1
7.5 TABLET ORAL 2 TIMES DAILY
Qty: 60 TABLET | Refills: 0 | Status: SHIPPED | OUTPATIENT
Start: 2021-12-22

## 2022-01-06 ENCOUNTER — TELEPHONE (OUTPATIENT)
Dept: FAMILY MEDICINE CLINIC | Age: 57
End: 2022-01-06

## 2022-01-06 NOTE — TELEPHONE ENCOUNTER
Pt is high risk wants to know if she can get paxlovid. Positive Monday for covid.  Current symptoms are   Congestion, stomach upset, fatigue, body aches, headache

## 2022-01-07 DIAGNOSIS — F41.9 ANXIETY: ICD-10-CM

## 2022-01-07 NOTE — TELEPHONE ENCOUNTER
Genevieve Patel is calling to request a refill on the following medication(s):    Medication Request:  Requested Prescriptions     Pending Prescriptions Disp Refills    FLUoxetine (PROZAC) 20 MG capsule [Pharmacy Med Name: FLUOXETIN(P) CAP 20MG] 90 capsule 1     Sig: TAKE 1 CAPSULE DAILY       Last Visit Date (If Applicable):  32/10/0915    Next Visit Date:    Visit date not found

## 2022-01-09 RX ORDER — FLUOXETINE HYDROCHLORIDE 20 MG/1
CAPSULE ORAL
Qty: 90 CAPSULE | Refills: 1 | Status: SHIPPED | OUTPATIENT
Start: 2022-01-09 | End: 2022-03-07

## 2022-02-07 ENCOUNTER — TELEPHONE (OUTPATIENT)
Dept: FAMILY MEDICINE CLINIC | Age: 57
End: 2022-02-07

## 2022-02-07 NOTE — TELEPHONE ENCOUNTER
Pt called in stating that metfromin XR was recalled and wanting to know if there is an alternative treatment please advise thank you!

## 2022-03-07 ENCOUNTER — OFFICE VISIT (OUTPATIENT)
Dept: FAMILY MEDICINE CLINIC | Age: 57
End: 2022-03-07
Payer: COMMERCIAL

## 2022-03-07 VITALS
DIASTOLIC BLOOD PRESSURE: 82 MMHG | TEMPERATURE: 97 F | OXYGEN SATURATION: 98 % | BODY MASS INDEX: 25.13 KG/M2 | WEIGHT: 128 LBS | SYSTOLIC BLOOD PRESSURE: 136 MMHG | HEIGHT: 60 IN | HEART RATE: 96 BPM

## 2022-03-07 DIAGNOSIS — F41.1 GAD (GENERALIZED ANXIETY DISORDER): ICD-10-CM

## 2022-03-07 DIAGNOSIS — I10 ESSENTIAL HYPERTENSION: Primary | ICD-10-CM

## 2022-03-07 DIAGNOSIS — Z12.11 SCREEN FOR COLON CANCER: ICD-10-CM

## 2022-03-07 DIAGNOSIS — E11.9 TYPE 2 DIABETES MELLITUS WITHOUT COMPLICATION, WITHOUT LONG-TERM CURRENT USE OF INSULIN (HCC): ICD-10-CM

## 2022-03-07 DIAGNOSIS — Z23 NEED FOR PROPHYLACTIC VACCINATION AND INOCULATION AGAINST VARICELLA: ICD-10-CM

## 2022-03-07 DIAGNOSIS — Z87.891 PERSONAL HISTORY OF TOBACCO USE: ICD-10-CM

## 2022-03-07 LAB — HBA1C MFR BLD: 5.8 %

## 2022-03-07 PROCEDURE — 99214 OFFICE O/P EST MOD 30 MIN: CPT | Performed by: STUDENT IN AN ORGANIZED HEALTH CARE EDUCATION/TRAINING PROGRAM

## 2022-03-07 PROCEDURE — 83036 HEMOGLOBIN GLYCOSYLATED A1C: CPT | Performed by: STUDENT IN AN ORGANIZED HEALTH CARE EDUCATION/TRAINING PROGRAM

## 2022-03-07 PROCEDURE — G0296 VISIT TO DETERM LDCT ELIG: HCPCS | Performed by: STUDENT IN AN ORGANIZED HEALTH CARE EDUCATION/TRAINING PROGRAM

## 2022-03-07 RX ORDER — FENTANYL 25 UG/H
PATCH TRANSDERMAL
COMMUNITY
Start: 2022-02-12

## 2022-03-07 RX ORDER — LORAZEPAM 0.5 MG/1
0.5 TABLET ORAL NIGHTLY PRN
Qty: 15 TABLET | Refills: 0 | Status: SHIPPED | OUTPATIENT
Start: 2022-03-07 | End: 2022-03-21 | Stop reason: SDUPTHER

## 2022-03-07 ASSESSMENT — ENCOUNTER SYMPTOMS
WHEEZING: 0
SORE THROAT: 0
VOMITING: 0
COUGH: 0
SHORTNESS OF BREATH: 0
DIARRHEA: 0
NAUSEA: 0
ABDOMINAL PAIN: 0
CHEST TIGHTNESS: 0
EYE DISCHARGE: 0
CONSTIPATION: 0

## 2022-03-07 ASSESSMENT — PATIENT HEALTH QUESTIONNAIRE - PHQ9: DEPRESSION UNABLE TO ASSESS: PT REFUSES

## 2022-03-07 NOTE — PROGRESS NOTES
[address] PRIMARY CARE  [address] [place] [address] [address] [place] [address]  Dept: [phone]  Dept Fax: [phone]    Evelyne Valera is a 64 y.o. female who is a Established patient, who presents today for her medical conditions/complaints as noted below:  Chief Complaint   Patient presents with    Establish Care         HPI:     She is here today to follow-up on diabetes and hypertension. She says that in the past she has had issues with controlling her blood pressures. She is currently taking all the medications as prescribed. Says that she is going through a lot of stress lately with her ongoing divorce and some financial issues. She says that increasing the dose of Prozac did help her with anxiety but she is requesting a short-term prescription for lorazepam for acute anxiety attacks and sleep. She has previously tried it and it has helped her. She says that she has been taking Metformin 750 mg XR and recently received a notice stating that it was on recall. She still continue to take it because she did not want to get her blood sugars high. She is also taking Ozempic and says that she has managed to lose some weight on it and she would like to continue that. She is due for pneumonia vaccine but says that her insurance sometimes does not cover shots so she will check with her pharmacy. Hemoglobin A1C (%)   Date Value   03/07/2022 5.8   10/12/2021 6.3 (H)   10/27/2020 7.8             ( goal A1Cis < 7)   Microalb/Crt.  Ratio (mcg/mg creat)   Date Value   10/12/2021 CANNOT BE CALCULATED     LDL Cholesterol (mg/dL)   Date Value   10/12/2021 114     LDL Calculated (mg/dL)   Date Value   07/20/2020 77   07/10/2019 87   08/29/2018 82       (goal LDL is <100)   AST (U/L)   Date Value   10/12/2021 16     ALT (U/L)   Date Value   10/12/2021 25     BUN (mg/dL)   Date Value   10/12/2021 16     BP Readings from Last 3 Encounters:   03/07/22 136/82   10/09/21 (!) 167/94   04/01/21 130/72          (goal 120/80)    Past Medical History:   Diagnosis Date    Abnormal weight gain 2012    Back pain 2012    Cervical disc disease     Chronic back pain     Diabetes mellitus ([facility] [address]     Fibromyalgia     Hypertension     Labile blood pressure 3/22/2014    Spasm of muscle 2012    Tachycardia 3/21/2014      Past Surgical History:   Procedure Laterality Date    BREAST ENHANCEMENT SURGERY      CERVICAL [address] [employer] SURGERY  2014    ANTERIOR CERVICAL MICRODISECTOMY AND FUSION C 6-7--saw  in [place]  then  was done at [address]  2021    C3-C4 ACDF    CERVICAL DISCECTOMY      HEMORRHOID SURGERY      OTHER SURGICAL HISTORY  2018    fistulotomy repair, Dr Jovana Walter History   Problem Relation Age of Onset    Diabetes Mother     Heart Disease Mother         had CABG x 2    Other Mother         peripheral arterial disease    Diabetes Father     High Blood Pressure Father     Kidney Disease Father     Heart Failure Father     Stroke Brother         was 47years old   Prince Gonzalez Diabetes type 2  Maternal Grandmother     Heart Disease Maternal Grandmother          at 80    Heart Attack Maternal Grandfather          in his 42's    Diabetes Paternal Grandmother     Osteoarthritis Paternal Grandfather     Breast Cancer Neg Hx     Uterine Cancer Neg Hx     Colon Cancer Neg Hx     Ovarian Cancer Neg Hx        Social History     Tobacco Use    Smoking status: Light Tobacco Smoker     Packs/day: 1.00     Years: 30.00     Pack years: 30.00     Types: Cigarettes     Last attempt to quit: 2011     Years since quitting: 10.3    Smokeless tobacco: Never Used    Tobacco comment: e-cig with lowest nicotine   Substance Use Topics    Alcohol use: No      Current Outpatient Medications   Medication Sig Dispense Refill    zoster recombinant adjuvanted vaccine (SHINGRIX) 50 MCG/0.5ML SUSR injection Inject 0.5 mLs into the muscle once for 1 dose 50 MCG IM then repeat 2-6 months. 1 each 1    Ascorbic Acid (VITAMIN C PO) 150 mg      Multiple Vitamins-Minerals (MULTIVITAMIN ADULTS PO) Take by mouth      COLLAGEN PO       fentaNYL (DURAGESIC) 25 MCG/HR apply 1 patch TO SKIN  AND REPLACE every 72 hours      LORazepam (ATIVAN) 0.5 MG tablet Take 1 tablet by mouth nightly as needed for Anxiety for up to 30 days. 15 tablet 0    meloxicam (MOBIC) 7.5 MG tablet Take 1 tablet by mouth 2 times daily 60 tablet 0    hydroCHLOROthiazide (HYDRODIURIL) 25 MG tablet TAKE 1 TABLET EVERY MORNING 90 tablet 1    gabapentin (NEURONTIN) 100 MG capsule Take 100 mg by mouth 3 times daily.  metoprolol succinate (TOPROL XL) 25 MG extended release tablet TAKE 1 TABLET BY MOUTH EVERY DAY 30 tablet 5    FLUoxetine (PROZAC) 40 MG capsule TAKE 1 CAPSULE DAILY 90 capsule 1    amLODIPine (NORVASC) 10 MG tablet Take 1 tablet by mouth daily 90 tablet 1    naloxone (NARCAN) 0.4 MG/ML SOCT injection Infuse 1 mL intravenously as needed (drug overdose) 1 mL 0    losartan (COZAAR) 100 MG tablet Take 1 tablet by mouth daily 90 tablet 1    Semaglutide, 1 MG/DOSE, (OZEMPIC, 1 MG/DOSE,) 2 MG/1.5ML SOPN Inject 1 mg into the skin once a week 12 mL 2    atorvastatin (LIPITOR) 10 MG tablet Take 1 tablet by mouth daily 90 tablet 1    metaxalone (SKELAXIN) 800 MG tablet take 1 tablet by mouth twice a day      Coenzyme Q10 (COQ10) 200 MG CAPS Take 200 mg by mouth daily      blood glucose monitor strips Test 2 times a day & as needed for symptoms of irregular blood glucose.  100 strip 5    Lancets MISC Please dispense lancets covered per insurance 100 each 3    magnesium oxide (MAG-OX) 400 MG tablet Take 400 mg by mouth daily      Saccharomyces boulardii (PROBIOTIC) 250 MG CAPS Take 250 mg by mouth daily      Blood Glucose Monitoring Suppl (ONE TOUCH ULTRA 2) w/Device KIT USE TO CHECK BLOOD SUGAR AS DIRECTED  0    Melatonin 1 MG Take 3 tablets by mouth daily as needed (sleep) 90 tablet 0    Cholecalciferol (VITAMIN D) 2000 UNITS CAPS capsule Take 1 capsule by mouth daily.  Omega 3 1000 MG CAPS Take  by mouth daily. No current facility-administered medications for this visit. Allergies   Allergen Reactions    Pregabalin      Other reaction(s): hand/foot edema    Amaryl [Glimepiride] Other (See Comments)     Low blood sugar    Cyclobenzaprine      Other reaction(s): sedation, constiatpion, dry mouth    Penicillins     Duloxetine Hcl      Other reaction(s): weight gain    Gabapentin Rash       Health Maintenance   Topic Date Due    Pneumococcal 0-64 years Vaccine (1 of 2 - PPSV23) Never done    Hepatitis B vaccine (1 of 3 - Risk 3-dose series) Never done    Diabetic retinal exam  09/23/2011    Low dose CT lung screening  Never done    Colorectal Cancer Screen  04/28/2018    Shingles Vaccine (2 of 2) 12/23/2020    COVID-19 Vaccine (3 - Booster) 08/31/2021    Flu vaccine (1) 09/01/2021    Diabetic foot exam  10/12/2021    Depression Monitoring  04/01/2022    Diabetic microalbuminuria test  10/12/2022    Lipid screen  10/12/2022    Potassium monitoring  10/12/2022    Creatinine monitoring  10/12/2022    Breast cancer screen  01/30/2023    A1C test (Diabetic or Prediabetic)  03/07/2023    Cervical cancer screen  09/04/2024    DTaP/Tdap/Td vaccine (2 - Td or Tdap) 08/28/2025    Hepatitis C screen  Completed    HIV screen  Completed    Hepatitis A vaccine  Aged Out    Hib vaccine  Aged Out    Meningococcal (ACWY) vaccine  Aged Out       Subjective:     Review of Systems   Constitutional: Negative for appetite change, fatigue and fever. HENT: Negative for congestion, ear pain, hearing loss and sore throat. Eyes: Negative for discharge and visual disturbance. Respiratory: Negative for cough, chest tightness, shortness of breath and wheezing.     Cardiovascular: Negative for chest pain, palpitations and leg swelling. Gastrointestinal: Negative for abdominal pain, constipation, diarrhea, nausea and vomiting. Genitourinary: Negative for flank pain, frequency, hematuria and urgency. Musculoskeletal: Negative for arthralgias, gait problem, joint swelling and myalgias. Skin: Negative. Neurological: Negative for dizziness, weakness, numbness and headaches. Psychiatric/Behavioral: Positive for dysphoric mood and sleep disturbance. The patient is nervous/anxious. Objective:     Physical Exam  Vitals reviewed. Constitutional:       Appearance: Normal appearance. She is normal weight. HENT:      Head: Normocephalic and atraumatic. Nose: Nose normal.      Mouth/Throat:      Mouth: Mucous membranes are moist.      Pharynx: Oropharynx is clear. Eyes:      Extraocular Movements: Extraocular movements intact. Conjunctiva/sclera: Conjunctivae normal.      Pupils: Pupils are equal, round, and reactive to light. Cardiovascular:      Rate and Rhythm: Normal rate and regular rhythm. Heart sounds: Normal heart sounds. No murmur heard. No gallop. Pulmonary:      Effort: Pulmonary effort is normal. No respiratory distress. Breath sounds: Normal breath sounds. No stridor. No wheezing. Abdominal:      General: Bowel sounds are normal. There is no distension. Palpations: Abdomen is soft. Tenderness: There is no abdominal tenderness. There is no guarding or rebound. Musculoskeletal:         General: No swelling or tenderness. Normal range of motion. Cervical back: Normal range of motion and neck supple. Skin:     General: Skin is warm and dry. Coloration: Skin is not jaundiced. Findings: No rash. Neurological:      General: No focal deficit present. Mental Status: She is alert and oriented to person, place, and time. Psychiatric:         Mood and Affect: Mood normal.         Behavior: Behavior normal.         Thought Content:  Thought content normal.         Judgment: Judgment normal.       /82   Pulse 96   Temp 97 °F (36.1 °C)   Ht 5' (1.524 m)   Wt 128 lb (58.1 kg)   SpO2 98%   BMI 25.00 kg/m²     Assessment/Plan:   1. Essential hypertension  2. Type 2 diabetes mellitus without complication, without long-term current use of insulin (HCC)  -     POCT glycosylated hemoglobin (Hb A1C)  3. DAV (generalized anxiety disorder)  -     LORazepam (ATIVAN) 0.5 MG tablet; Take 1 tablet by mouth nightly as needed for Anxiety for up to 30 days. , Disp-15 tablet, R-0Normal  4. Personal history of tobacco use  -     VT VISIT TO DISCUSS LUNG CA SCREEN W LDCT  -     CT Lung Screen (Annual); Future  5. Need for prophylactic vaccination and inoculation against varicella  -     zoster recombinant adjuvanted vaccine [facility]) 50 MCG/0.5ML SUSR injection; Inject 0.5 mLs into the muscle once for 1 dose 50 MCG IM then repeat 2-6 months., Disp-1 each, R-1Print  6. Screen for colon cancer  -     Fecal DNA Colorectal cancer screening (Cologuard)    Hypertension-controlled, continue amlodipine 10 mg daily, hydrochlorothiazide 25 mg daily, losartan 100 mg daily and metoprolol succinate 25 mg daily. Type 2 diabetes-POCT A1c today 5.8, discontinued Metformin and continue Ozempic 1 mg/week. Anxiety-on Prozac 40 mg daily, started on lorazepam 0.5 mg as needed for anxiety and sleep        Return in about 3 months (around 6/7/2022) for Follow up DM/HTN. Orders Placed This Encounter   Procedures    Fecal DNA Colorectal cancer screening (Cologuard)    CT Lung Screen (Annual)     Age: Patient is 64 y.o.     Smoking History: Social History    Tobacco Use      Smoking status: Light Tobacco Smoker        Packs/day: 1.00        Years: 30.00        Pack years: 30        Types: Cigarettes        Quit date: 11/16/2011        Years since quitting: 10.3      Smokeless tobacco: Never Used      Tobacco comment: e-cig with lowest nicotine    Vaping Use      Vaping Use: Every day Start date: 11/7/2013        Substances: Always    Alcohol use: No    Drug use: No   Pack years: 30    Date of last lung cancer screening: No previous lung cancer screening exam     Standing Status:   Future     Standing Expiration Date:   9/7/2023     Order Specific Question:   Is there documentation of shared decision making? Answer:   Yes     Order Specific Question:   Is this a low dose CT or a routine CT? Answer:   Low Dose CT [1]     Order Specific Question:   Is this the first (baseline) CT or an annual exam?     Answer:   Baseline [1]     Order Specific Question:   Does the patient show any signs or symptoms of lung cancer? Answer:   No     Order Specific Question:   Smoking Status? Answer:   Light Tobacco Smoker [10]     Order Specific Question:   Smoking packs per day? Answer:   1     Order Specific Question:   Years smoking? Answer:   30    POCT glycosylated hemoglobin (Hb A1C)    IN VISIT TO DISCUSS LUNG CA SCREEN W LDCT     Orders Placed This Encounter   Medications    zoster recombinant adjuvanted vaccine (SHINGRIX) 50 MCG/0.5ML SUSR injection     Sig: Inject 0.5 mLs into the muscle once for 1 dose 50 MCG IM then repeat 2-6 months. Dispense:  1 each     Refill:  1    LORazepam (ATIVAN) 0.5 MG tablet     Sig: Take 1 tablet by mouth nightly as needed for Anxiety for up to 30 days. Dispense:  15 tablet     Refill:  0       Patient given educational materials - see patient instructions. Discussed use, benefit, and side effects of prescribed medications. All patientquestions answered. Pt voiced understanding. Reviewed health maintenance. Instructedto continue current medications, diet and exercise. Patient agreed with treatmentplan. Follow up as directed.      Electronically signed by Suzanne Benjamin MD on 3/7/2022 at 4:56 PM  Low Dose CT (LDCT) Lung Screening criteria met:     Age 55-77(Medicare) or 50-80 (USPSTF)   Pack year smoking >30 (Medicare) or >20 (USPSTF)   Still smoking or less than 15 year since quit   No sign or symptoms of lung cancer   > 11 months since last LDCT     Risks and benefits of lung cancer screening with LDCT scans discussed:    Significance of positive screen - False-positive LDCT results often occur. 95% of all positive results do not lead to a diagnosis of cancer. Usually further imaging can resolve most false-positive results; however, some patients may require invasive procedures. Over diagnosis risk - 10% to 12% of screen-detected lung cancer cases are over diagnosed--that is, the cancer would not have been detected in the patient's lifetime without the screening. Need for follow up screens annually to continue lung cancer screening effectiveness     Risks associated with radiation from annual LDCT- Radiation exposure is about the same as for a mammogram, which is about 1/3 of the annual background radiation exposure from everyday life. Starting screening at age 54 is not likely to increase cancer risk from radiation exposure. Patients with comorbidities resulting in life expectancy of < 10 years, or that would preclude treatment of an abnormality identified on CT, should not be screened due to lack of benefit.     To obtain maximal benefit from this screening, smoking cessation and long-term abstinence from smoking is critical

## 2022-03-21 ENCOUNTER — PATIENT MESSAGE (OUTPATIENT)
Dept: FAMILY MEDICINE CLINIC | Age: 57
End: 2022-03-21

## 2022-03-21 DIAGNOSIS — F41.1 GAD (GENERALIZED ANXIETY DISORDER): ICD-10-CM

## 2022-03-21 DIAGNOSIS — I10 ESSENTIAL HYPERTENSION: ICD-10-CM

## 2022-03-21 RX ORDER — METOPROLOL SUCCINATE 25 MG/1
TABLET, EXTENDED RELEASE ORAL
Qty: 30 TABLET | Refills: 5 | Status: SHIPPED | OUTPATIENT
Start: 2022-03-21 | End: 2022-06-07

## 2022-03-21 RX ORDER — LORAZEPAM 0.5 MG/1
0.5 TABLET ORAL NIGHTLY PRN
Qty: 15 TABLET | Refills: 0 | Status: SHIPPED | OUTPATIENT
Start: 2022-03-21 | End: 2022-04-20

## 2022-03-21 RX ORDER — HYDROCHLOROTHIAZIDE 25 MG/1
TABLET ORAL
Qty: 90 TABLET | Refills: 1 | Status: SHIPPED | OUTPATIENT
Start: 2022-03-21 | End: 2022-09-02 | Stop reason: SDUPTHER

## 2022-03-21 NOTE — TELEPHONE ENCOUNTER
From: Reina Chang  To: Dr. Samson Hayden: 3/21/2022 10:51 AM EDT  Subject: Anxiety/blood pressure     Hi. I have been experiencing extremely higher anxiety than normal lately. .. Aurora Concepcion my blood pressure readings have been very high again, so may be due to that? I am also under quite a bit of extreme stress lately, higher than normal.. Aurora Latkatie Concepcion

## 2022-03-21 NOTE — TELEPHONE ENCOUNTER
Iron Wallace is calling to request a refill on the following medication(s):    Medication Request:  Requested Prescriptions     Pending Prescriptions Disp Refills    LORazepam (ATIVAN) 0.5 MG tablet 15 tablet 0     Sig: Take 1 tablet by mouth nightly as needed for Anxiety for up to 30 days.     hydroCHLOROthiazide (HYDRODIURIL) 25 MG tablet 90 tablet 1     Sig: TAKE 1 TABLET EVERY MORNING       Last Visit Date (If Applicable):  8/9/9284    Next Visit Date:    6/7/2022

## 2022-03-24 LAB — NONINV COLON CA DNA+OCC BLD SCRN STL QL: NORMAL

## 2022-03-30 ENCOUNTER — TELEPHONE (OUTPATIENT)
Dept: FAMILY MEDICINE CLINIC | Age: 57
End: 2022-03-30

## 2022-03-30 NOTE — TELEPHONE ENCOUNTER
Lvm for patient to call back to see if she is taking Metformin ER 750mg twice daily. [facility]

## 2022-03-31 RX ORDER — LOSARTAN POTASSIUM 100 MG/1
100 TABLET ORAL DAILY
Qty: 90 TABLET | Refills: 1 | Status: SHIPPED | OUTPATIENT
Start: 2022-03-31 | End: 2022-10-19

## 2022-03-31 NOTE — TELEPHONE ENCOUNTER
----- Message from Staci Manning sent at 3/31/2022 11:09 AM EDT -----  Subject: Refill Request    QUESTIONS  Name of Medication? losartan (COZAAR) 100 MG tablet  Patient-reported dosage and instructions? 1x day  How many days do you have left? 0  Preferred Pharmacy? [facility] phone number (if available)? [phone]  Additional Information for Provider? Patient has been out of medication   for 3 days . She is needing ASap  ---------------------------------------------------------------------------  --------------  CALL BACK INFO  What is the best way for the office to contact you? OK to leave message on   voicemail  Preferred Call Back Phone Number?  [phone]

## 2022-06-01 DIAGNOSIS — F41.9 ANXIETY: ICD-10-CM

## 2022-06-02 RX ORDER — FLUOXETINE HYDROCHLORIDE 20 MG/1
CAPSULE ORAL
Qty: 90 CAPSULE | Refills: 1 | OUTPATIENT
Start: 2022-06-02

## 2022-06-02 NOTE — TELEPHONE ENCOUNTER
Kamla Baez is calling to request a refill on the following medication(s):    Medication Request:  Requested Prescriptions     Pending Prescriptions Disp Refills    FLUoxetine (PROZAC) 20 MG capsule [Pharmacy Med Name: FLUOXETIN(P) CAP 20MG] 90 capsule 1     Sig: TAKE 1 CAPSULE DAILY       Last Visit Date (If Applicable):  5/9/4919    Next Visit Date:    6/7/2022

## 2022-06-07 ENCOUNTER — OFFICE VISIT (OUTPATIENT)
Dept: FAMILY MEDICINE CLINIC | Age: 57
End: 2022-06-07
Payer: COMMERCIAL

## 2022-06-07 VITALS
BODY MASS INDEX: 24.94 KG/M2 | HEIGHT: 60 IN | TEMPERATURE: 97.4 F | DIASTOLIC BLOOD PRESSURE: 65 MMHG | SYSTOLIC BLOOD PRESSURE: 111 MMHG | HEART RATE: 75 BPM | WEIGHT: 127 LBS

## 2022-06-07 DIAGNOSIS — F41.1 GAD (GENERALIZED ANXIETY DISORDER): ICD-10-CM

## 2022-06-07 DIAGNOSIS — Z87.891 PERSONAL HISTORY OF TOBACCO USE: ICD-10-CM

## 2022-06-07 DIAGNOSIS — I10 ESSENTIAL HYPERTENSION: ICD-10-CM

## 2022-06-07 DIAGNOSIS — E11.9 TYPE 2 DIABETES MELLITUS WITHOUT COMPLICATION, WITHOUT LONG-TERM CURRENT USE OF INSULIN (HCC): Primary | ICD-10-CM

## 2022-06-07 DIAGNOSIS — Z12.31 ENCOUNTER FOR SCREENING MAMMOGRAM FOR BREAST CANCER: ICD-10-CM

## 2022-06-07 LAB — HBA1C MFR BLD: 6.3 %

## 2022-06-07 PROCEDURE — G0296 VISIT TO DETERM LDCT ELIG: HCPCS | Performed by: STUDENT IN AN ORGANIZED HEALTH CARE EDUCATION/TRAINING PROGRAM

## 2022-06-07 PROCEDURE — 99406 BEHAV CHNG SMOKING 3-10 MIN: CPT | Performed by: STUDENT IN AN ORGANIZED HEALTH CARE EDUCATION/TRAINING PROGRAM

## 2022-06-07 PROCEDURE — 99214 OFFICE O/P EST MOD 30 MIN: CPT | Performed by: STUDENT IN AN ORGANIZED HEALTH CARE EDUCATION/TRAINING PROGRAM

## 2022-06-07 PROCEDURE — 3044F HG A1C LEVEL LT 7.0%: CPT | Performed by: STUDENT IN AN ORGANIZED HEALTH CARE EDUCATION/TRAINING PROGRAM

## 2022-06-07 PROCEDURE — 83036 HEMOGLOBIN GLYCOSYLATED A1C: CPT | Performed by: STUDENT IN AN ORGANIZED HEALTH CARE EDUCATION/TRAINING PROGRAM

## 2022-06-07 RX ORDER — SEMAGLUTIDE 2.68 MG/ML
2 INJECTION, SOLUTION SUBCUTANEOUS WEEKLY
Qty: 3 ML | Refills: 1 | Status: SHIPPED | OUTPATIENT
Start: 2022-06-07

## 2022-06-07 ASSESSMENT — PATIENT HEALTH QUESTIONNAIRE - PHQ9
10. IF YOU CHECKED OFF ANY PROBLEMS, HOW DIFFICULT HAVE THESE PROBLEMS MADE IT FOR YOU TO DO YOUR WORK, TAKE CARE OF THINGS AT HOME, OR GET ALONG WITH OTHER PEOPLE: 1
9. THOUGHTS THAT YOU WOULD BE BETTER OFF DEAD, OR OF HURTING YOURSELF: 2
2. FEELING DOWN, DEPRESSED OR HOPELESS: 0
SUM OF ALL RESPONSES TO PHQ QUESTIONS 1-9: 10
7. TROUBLE CONCENTRATING ON THINGS, SUCH AS READING THE NEWSPAPER OR WATCHING TELEVISION: 3
SUM OF ALL RESPONSES TO PHQ QUESTIONS 1-9: 10
SUM OF ALL RESPONSES TO PHQ QUESTIONS 1-9: 1
SUM OF ALL RESPONSES TO PHQ9 QUESTIONS 1 & 2: 1
1. LITTLE INTEREST OR PLEASURE IN DOING THINGS: 1
6. FEELING BAD ABOUT YOURSELF - OR THAT YOU ARE A FAILURE OR HAVE LET YOURSELF OR YOUR FAMILY DOWN: 0
SUM OF ALL RESPONSES TO PHQ QUESTIONS 1-9: 1
SUM OF ALL RESPONSES TO PHQ QUESTIONS 1-9: 8
3. TROUBLE FALLING OR STAYING ASLEEP: 2
SUM OF ALL RESPONSES TO PHQ QUESTIONS 1-9: 10
SUM OF ALL RESPONSES TO PHQ QUESTIONS 1-9: 1
1. LITTLE INTEREST OR PLEASURE IN DOING THINGS: 1
SUM OF ALL RESPONSES TO PHQ QUESTIONS 1-9: 1
4. FEELING TIRED OR HAVING LITTLE ENERGY: 2
5. POOR APPETITE OR OVEREATING: 0

## 2022-06-07 ASSESSMENT — ENCOUNTER SYMPTOMS
DIARRHEA: 0
SORE THROAT: 0
CONSTIPATION: 0
CHEST TIGHTNESS: 0
VOMITING: 0
SHORTNESS OF BREATH: 0
COUGH: 0
EYE DISCHARGE: 0
NAUSEA: 0
ABDOMINAL PAIN: 0
WHEEZING: 0

## 2022-06-07 ASSESSMENT — COLUMBIA-SUICIDE SEVERITY RATING SCALE - C-SSRS
6. HAVE YOU EVER DONE ANYTHING, STARTED TO DO ANYTHING, OR PREPARED TO DO ANYTHING TO END YOUR LIFE?: NO
1. WITHIN THE PAST MONTH, HAVE YOU WISHED YOU WERE DEAD OR WISHED YOU COULD GO TO SLEEP AND NOT WAKE UP?: NO
6. HAVE YOU EVER DONE ANYTHING, STARTED TO DO ANYTHING, OR PREPARED TO DO ANYTHING TO END YOUR LIFE?: NO
2. HAVE YOU ACTUALLY HAD ANY THOUGHTS OF KILLING YOURSELF?: NO
2. HAVE YOU ACTUALLY HAD ANY THOUGHTS OF KILLING YOURSELF?: NO
1. WITHIN THE PAST MONTH, HAVE YOU WISHED YOU WERE DEAD OR WISHED YOU COULD GO TO SLEEP AND NOT WAKE UP?: NO

## 2022-06-07 NOTE — PROGRESS NOTES
[address] PRIMARY CARE  [address] [place] [address]  Dept: [phone]  Dept Fax: [phone]    Crystal Cox is a 62 y.o. female who is a Established patient, who presents today for her medical conditions/complaints as noted below:  Chief Complaint   Patient presents with    Diabetes    Hypertension         HPI:     She is here today to follow-up on diabetes and hypertension. She says that she has lately noticed her blood pressures have been falling and she felt very tired so she stopped taking metoprolol. Her blood pressures are still well controlled. She has been on Ozempic for her diabetes and metformin was discontinued last visit due to very well controlled A1c. Her blood sugars are now rising and A1c today was higher, she does not want to go back on metformin due to stomach issues and would rather try higher dose of Ozempic. She says that it did help her lose weight initially so she would like to continue that. She says that she has been doing well on Prozac and her anxiety is controlled. She still continues to smoke occasionally and says that she has been trying to cut down. She is due for lung cancer screening and mammogram.           Hemoglobin A1C (%)   Date Value   06/07/2022 6.3   03/07/2022 5.8   10/12/2021 6.3 (H)             ( goal A1Cis < 7)   Microalb/Crt.  Ratio (mcg/mg creat)   Date Value   10/12/2021 CANNOT BE CALCULATED     LDL Cholesterol (mg/dL)   Date Value   10/12/2021 114     LDL Calculated (mg/dL)   Date Value   07/20/2020 77   07/10/2019 87   08/29/2018 82       (goal LDL is <100)   AST (U/L)   Date Value   10/12/2021 16     ALT (U/L)   Date Value   10/12/2021 25     BUN (mg/dL)   Date Value   10/12/2021 16     BP Readings from Last 3 Encounters:   06/07/22 111/65   03/07/22 136/82   10/09/21 (!) 167/94          (goal 120/80)    Past Medical History:   Diagnosis Date    Abnormal weight gain 5/14/2012    Back pain 4/18/2012    Cervical disc disease     Chronic back pain     Diabetes mellitus ([facility] [address]     Fibromyalgia     Hypertension     Labile blood pressure 3/22/2014    Spasm of muscle 2012    Tachycardia 3/21/2014      Past Surgical History:   Procedure Laterality Date    BREAST ENHANCEMENT SURGERY      CERVICAL [address] SURGERY  2014    ANTERIOR CERVICAL MICRODISECTOMY AND FUSION C 6-7--saw  in [place]  then  was done at [address]  2021    C3-C4 ACDF    CERVICAL DISCECTOMY      HEMORRHOID SURGERY      OTHER SURGICAL HISTORY  2018    fistulotomy repair, Dr Lu Rodrigez History   Problem Relation Age of Onset    Diabetes Mother     Heart Disease Mother         had CABG x 2    Other Mother         peripheral arterial disease    Diabetes Father     High Blood Pressure Father     Kidney Disease Father     Heart Failure Father     Stroke Brother         was 47years old   Olson Diabetes type 2  Maternal Grandmother     Heart Disease Maternal Grandmother          at 80    Heart Attack Maternal Grandfather          in his 42's    Diabetes Paternal Grandmother     Osteoarthritis Paternal Grandfather     Breast Cancer Neg Hx     Uterine Cancer Neg Hx     Colon Cancer Neg Hx     Ovarian Cancer Neg Hx        Social History     Tobacco Use    Smoking status: Light Tobacco Smoker     Packs/day: 1.00     Years: 30.00     Pack years: 30.00     Types: Cigarettes     Start date: 1992     Last attempt to quit: 2011     Years since quitting: 10.5    Smokeless tobacco: Never Used    Tobacco comment: e-cig with lowest nicotine   Substance Use Topics    Alcohol use: No      Current Outpatient Medications   Medication Sig Dispense Refill    Semaglutide, 2 MG/DOSE, (OZEMPIC, 2 MG/DOSE,) 8 MG/3ML SOPN Inject 2 mg into the skin once a week 3 mL 1    losartan (COZAAR) 100 MG tablet Take 1 tablet by mouth daily 90 tablet 1    hydroCHLOROthiazide (HYDRODIURIL) 25 MG tablet TAKE 1 TABLET EVERY MORNING 90 tablet 1    Multiple Vitamins-Minerals (MULTIVITAMIN ADULTS PO) Take by mouth      fentaNYL (DURAGESIC) 25 MCG/HR apply 1 patch TO SKIN  AND REPLACE every 72 hours      meloxicam (MOBIC) 7.5 MG tablet Take 1 tablet by mouth 2 times daily 60 tablet 0    gabapentin (NEURONTIN) 100 MG capsule Take 100 mg by mouth 3 times daily.  FLUoxetine (PROZAC) 40 MG capsule TAKE 1 CAPSULE DAILY 90 capsule 1    amLODIPine (NORVASC) 10 MG tablet Take 1 tablet by mouth daily 90 tablet 1    atorvastatin (LIPITOR) 10 MG tablet Take 1 tablet by mouth daily 90 tablet 1    metaxalone (SKELAXIN) 800 MG tablet take 1 tablet by mouth twice a day      Coenzyme Q10 (COQ10) 200 MG CAPS Take 200 mg by mouth daily      Lancets MISC Please dispense lancets covered per insurance 100 each 3    magnesium oxide (MAG-OX) 400 MG tablet Take 400 mg by mouth daily      Saccharomyces boulardii (PROBIOTIC) 250 MG CAPS Take 250 mg by mouth daily      Melatonin 1 MG Take 3 tablets by mouth daily as needed (sleep) 90 tablet 0    Cholecalciferol (VITAMIN D) 2000 UNITS CAPS capsule Take 1 capsule by mouth daily.  Omega 3 1000 MG CAPS Take  by mouth daily.  blood glucose monitor strips Test 2 times a day & as needed for symptoms of irregular blood glucose. 100 strip 5    Blood Glucose Monitoring Suppl (ONE TOUCH ULTRA 2) w/Device KIT USE TO CHECK BLOOD SUGAR AS DIRECTED  0     No current facility-administered medications for this visit.      Allergies   Allergen Reactions    Pregabalin      Other reaction(s): hand/foot edema    Amaryl [Glimepiride] Other (See Comments)     Low blood sugar    Cyclobenzaprine      Other reaction(s): sedation, constiatpion, dry mouth    Penicillins     Duloxetine Hcl      Other reaction(s): weight gain    Gabapentin Rash       Health Maintenance   Topic Date Due    Pneumococcal 0-64 years Vaccine (1 - PCV) Never done    Hepatitis B vaccine (1 of 3 - Risk 3-dose series) Never done    Diabetic retinal exam  09/23/2011    Low dose CT lung screening  Never done    Shingles vaccine (2 of 2) 12/23/2020    COVID-19 Vaccine (3 - Booster) 08/31/2021    Diabetic foot exam  10/12/2021    Depression Monitoring  04/01/2022    Flu vaccine (Season Ended) 09/01/2022    Diabetic microalbuminuria test  10/12/2022    Lipids  10/12/2022    Breast cancer screen  01/30/2023    A1C test (Diabetic or Prediabetic)  06/07/2023    Cervical cancer screen  09/04/2024    Colorectal Cancer Screen  03/21/2025    DTaP/Tdap/Td vaccine (2 - Td or Tdap) 08/28/2025    Hepatitis C screen  Completed    HIV screen  Completed    Hepatitis A vaccine  Aged Out    Hib vaccine  Aged Out    Meningococcal (ACWY) vaccine  Aged Out       Subjective:     Review of Systems   Constitutional: Negative for appetite change, fatigue and fever. HENT: Negative for congestion, ear pain, hearing loss and sore throat. Eyes: Negative for discharge and visual disturbance. Respiratory: Negative for cough, chest tightness, shortness of breath and wheezing. Cardiovascular: Negative for chest pain, palpitations and leg swelling. Gastrointestinal: Negative for abdominal pain, constipation, diarrhea, nausea and vomiting. Genitourinary: Negative for flank pain, frequency, hematuria and urgency. Musculoskeletal: Negative for arthralgias, gait problem, joint swelling and myalgias. Skin: Negative. Neurological: Negative for dizziness, weakness, numbness and headaches. Psychiatric/Behavioral: Negative. Negative for dysphoric mood. The patient is not nervous/anxious. Objective:     Physical Exam  Vitals reviewed. Constitutional:       Appearance: Normal appearance. She is normal weight. HENT:      Head: Normocephalic and atraumatic.       Nose: Nose normal.      Mouth/Throat:      Mouth: Mucous membranes are moist.      Pharynx: Oropharynx is clear. Eyes:      Extraocular Movements: Extraocular movements intact. Conjunctiva/sclera: Conjunctivae normal.      Pupils: Pupils are equal, round, and reactive to light. Cardiovascular:      Rate and Rhythm: Normal rate and regular rhythm. Pulses:           Dorsalis pedis pulses are 2+ on the right side and 2+ on the left side. Heart sounds: Normal heart sounds. No murmur heard. No gallop. Pulmonary:      Effort: Pulmonary effort is normal. No respiratory distress. Breath sounds: Normal breath sounds. No stridor. No wheezing. Abdominal:      General: Bowel sounds are normal. There is no distension. Palpations: Abdomen is soft. Tenderness: There is no abdominal tenderness. There is no guarding or rebound. Musculoskeletal:         General: No swelling or tenderness. Normal range of motion. Cervical back: Normal range of motion and neck supple. Right foot: Normal range of motion. No bunion. Left foot: Normal range of motion. No bunion. Feet:      Right foot:      Protective Sensation: 10 sites tested. 10 sites sensed. Skin integrity: Dry skin and fissure present. Toenail Condition: Right toenails are normal.      Left foot:      Protective Sensation: 10 sites tested. 10 sites sensed. Skin integrity: Dry skin and fissure present. Toenail Condition: Left toenails are normal.   Skin:     General: Skin is warm and dry. Coloration: Skin is not jaundiced. Findings: No rash. Neurological:      General: No focal deficit present. Mental Status: She is alert and oriented to person, place, and time. Psychiatric:         Mood and Affect: Mood normal.         Behavior: Behavior normal.         Thought Content:  Thought content normal.         Judgment: Judgment normal.       /65 (Site: Right Upper Arm, Position: Sitting, Cuff Size: Medium Adult)   Pulse 75   Temp 97.4 °F (36.3 °C) (Temporal)   Ht 5' (1.524 m) Wt 127 lb (57.6 kg)   BMI 24.80 kg/m²     Assessment/Plan:   1. Type 2 diabetes mellitus without complication, without long-term current use of insulin (HCC)  -     POCT glycosylated hemoglobin (Hb A1C)  -     Semaglutide, 2 MG/DOSE, (OZEMPIC, 2 MG/DOSE,) 8 MG/3ML SOPN; Inject 2 mg into the skin once a week, Disp-3 mL, R-1Normal  -     HM DIABETES FOOT EXAM  2. Essential hypertension  3. DAV (generalized anxiety disorder)  4. Personal history of tobacco use  -     CT VISIT TO DISCUSS LUNG CA SCREEN W LDCT  -     CT Lung Screen (Annual); Future  5. Encounter for screening mammogram for breast cancer  -     MAURY DIGITAL SCREEN W OR WO CAD BILATERAL; Future    Type 2 diabetes-POCT A1c today 6.3, no longer taking metformin. Increased Ozempic to 2 mg/week, advised to monitor blood sugars    Hypertension-controlled, continue losartan 100 mg daily, hydrochlorothiazide 25 mg daily and amlodipine 10 mg daily. Discontinued metoprolol succinate due to low blood pressure and fatigue    Anxiety-doing well on Prozac 40 mg daily    Patient was counseled on tobacco cessation. Based upon patient's motivation to change her behavior, the following plan was agreed upon: patient will avoid triggers and negative influences. She was provided with a list of local tobacco cessation resources. Provider spent 3 minutes counseling patient. Return in about 3 months (around 9/7/2022) for Follow up DM/HTN. Orders Placed This Encounter   Procedures    CT Lung Screen (Annual)     Age: Patient is 62 y.o.     Smoking History: Social History    Tobacco Use      Smoking status: Light Tobacco Smoker        Packs/day: 1.00        Years: 30.00        Pack years: 30        Types: Cigarettes        Start date: 6/7/1992        Quit date: 11/16/2011        Years since quitting: 10.5      Smokeless tobacco: Never Used      Tobacco comment: e-cig with lowest nicotine    Vaping Use      Vaping Use: Every day        Start date: 11/7/2013 Substances: Always    Alcohol use: No    Drug use: No   Pack years: 30    Date of last lung cancer screening: No previous lung cancer screening exam     Standing Status:   Future     Standing Expiration Date:   12/7/2023     Order Specific Question:   Is there documentation of shared decision making? Answer:   Yes     Order Specific Question:   Is this a low dose CT or a routine CT? Answer:   Low Dose CT [1]     Order Specific Question:   Is this the first (baseline) CT or an annual exam?     Answer:   Baseline [1]     Order Specific Question:   Does the patient show any signs or symptoms of lung cancer? Answer:   No     Order Specific Question:   Smoking Status? Answer:   Light Tobacco Smoker [10]     Order Specific Question:   Smoking packs per day? Answer:   1     Order Specific Question:   Years smoking? Answer:   27    MAURY DIGITAL SCREEN W OR WO CAD BILATERAL     Standing Status:   Future     Standing Expiration Date:   12/7/2022     Order Specific Question:   Reason for exam:     Answer:   screening Z12.31    POCT glycosylated hemoglobin (Hb A1C)    HM DIABETES FOOT EXAM    OH VISIT TO DISCUSS LUNG CA SCREEN W LDCT     Orders Placed This Encounter   Medications    Semaglutide, 2 MG/DOSE, (OZEMPIC, 2 MG/DOSE,) 8 MG/3ML SOPN     Sig: Inject 2 mg into the skin once a week     Dispense:  3 mL     Refill:  1       Patient given educational materials - see patient instructions. Discussed use, benefit, and side effects of prescribed medications. All patientquestions answered. Pt voiced understanding. Reviewed health maintenance. Instructedto continue current medications, diet and exercise. Patient agreed with treatmentplan. Follow up as directed.      Electronically signed by Mitzi Gannon MD on 6/7/2022 at 3:36 PM  Low Dose CT (LDCT) Lung Screening criteria met:     Age 50-77(Medicare) or 50-80 (USPSTF)   Pack year smoking >20   Still smoking or less than 15 year since quit   No sign or symptoms of lung cancer   > 11 months since last LDCT     Risks and benefits of lung cancer screening with LDCT scans discussed:    Significance of positive screen - False-positive LDCT results often occur. 95% of all positive results do not lead to a diagnosis of cancer. Usually further imaging can resolve most false-positive results; however, some patients may require invasive procedures. Over diagnosis risk - 10% to 12% of screen-detected lung cancer cases are over diagnosed--that is, the cancer would not have been detected in the patient's lifetime without the screening. Need for follow up screens annually to continue lung cancer screening effectiveness     Risks associated with radiation from annual LDCT- Radiation exposure is about the same as for a mammogram, which is about 1/3 of the annual background radiation exposure from everyday life. Starting screening at age 54 is not likely to increase cancer risk from radiation exposure. Patients with comorbidities resulting in life expectancy of < 10 years, or that would preclude treatment of an abnormality identified on CT, should not be screened due to lack of benefit.     To obtain maximal benefit from this screening, smoking cessation and long-term abstinence from smoking is critical

## 2022-09-02 DIAGNOSIS — I10 ESSENTIAL HYPERTENSION: ICD-10-CM

## 2022-09-02 RX ORDER — HYDROCHLOROTHIAZIDE 25 MG/1
TABLET ORAL
Qty: 90 TABLET | Refills: 1 | Status: SHIPPED | OUTPATIENT
Start: 2022-09-02

## 2022-09-02 NOTE — TELEPHONE ENCOUNTER
Monika Diana is calling to request a refill on the following medication(s):    Medication Request:  Requested Prescriptions     Pending Prescriptions Disp Refills    hydroCHLOROthiazide (HYDRODIURIL) 25 MG tablet 90 tablet 1     Sig: TAKE 1 TABLET EVERY MORNING       Last Visit Date (If Applicable):  9/5/5882    Next Visit Date:    Visit date not found

## 2022-09-30 ENCOUNTER — HOSPITAL ENCOUNTER (OUTPATIENT)
Facility: CLINIC | Age: 57
Discharge: HOME | End: 2022-09-30
Payer: COMMERCIAL

## 2022-09-30 DIAGNOSIS — R30.0 DYSURIA: ICD-10-CM

## 2022-09-30 DIAGNOSIS — R30.0 DYSURIA: Primary | ICD-10-CM

## 2022-09-30 PROCEDURE — 86403 PARTICLE AGGLUT ANTBDY SCRN: CPT

## 2022-09-30 PROCEDURE — 87086 URINE CULTURE/COLONY COUNT: CPT

## 2022-10-01 LAB
CULTURE: ABNORMAL
SPECIMEN DESCRIPTION: ABNORMAL

## 2022-10-19 RX ORDER — LOSARTAN POTASSIUM 100 MG/1
TABLET ORAL
Qty: 90 TABLET | Refills: 1 | Status: SHIPPED | OUTPATIENT
Start: 2022-10-19

## 2022-10-19 NOTE — TELEPHONE ENCOUNTER
Tammy Peterson is calling to request a refill on the following medication(s):    Medication Request:  Requested Prescriptions     Pending Prescriptions Disp Refills    losartan (COZAAR) 100 MG tablet [Pharmacy Med Name: LOSARTAN POTASSIUM 100 MG TAB] 90 tablet 1     Sig: take 1 tablet by mouth once daily       Last Visit Date (If Applicable):  4/6/0390    Next Visit Date:    11/9/2022

## 2022-10-20 ENCOUNTER — CLINICAL DOCUMENTATION (OUTPATIENT)
Dept: OTHER | Facility: CLINIC | Age: 57
End: 2022-10-20

## 2022-11-09 ENCOUNTER — TELEPHONE (OUTPATIENT)
Dept: FAMILY MEDICINE CLINIC | Age: 57
End: 2022-11-09

## 2022-12-22 ENCOUNTER — OFFICE VISIT (OUTPATIENT)
Dept: FAMILY MEDICINE CLINIC | Age: 57
End: 2022-12-22
Payer: COMMERCIAL

## 2022-12-22 VITALS
BODY MASS INDEX: 25.8 KG/M2 | WEIGHT: 131.4 LBS | HEIGHT: 60 IN | SYSTOLIC BLOOD PRESSURE: 113 MMHG | OXYGEN SATURATION: 98 % | TEMPERATURE: 97.1 F | DIASTOLIC BLOOD PRESSURE: 71 MMHG | HEART RATE: 85 BPM

## 2022-12-22 DIAGNOSIS — I10 ESSENTIAL HYPERTENSION: ICD-10-CM

## 2022-12-22 DIAGNOSIS — Z87.891 PERSONAL HISTORY OF TOBACCO USE: ICD-10-CM

## 2022-12-22 DIAGNOSIS — E11.9 TYPE 2 DIABETES MELLITUS WITHOUT COMPLICATION, WITHOUT LONG-TERM CURRENT USE OF INSULIN (HCC): Primary | ICD-10-CM

## 2022-12-22 DIAGNOSIS — Z12.31 ENCOUNTER FOR SCREENING MAMMOGRAM FOR MALIGNANT NEOPLASM OF BREAST: ICD-10-CM

## 2022-12-22 LAB — HBA1C MFR BLD: 5.9 %

## 2022-12-22 PROCEDURE — G0296 VISIT TO DETERM LDCT ELIG: HCPCS | Performed by: STUDENT IN AN ORGANIZED HEALTH CARE EDUCATION/TRAINING PROGRAM

## 2022-12-22 PROCEDURE — 3074F SYST BP LT 130 MM HG: CPT | Performed by: STUDENT IN AN ORGANIZED HEALTH CARE EDUCATION/TRAINING PROGRAM

## 2022-12-22 PROCEDURE — 99214 OFFICE O/P EST MOD 30 MIN: CPT | Performed by: STUDENT IN AN ORGANIZED HEALTH CARE EDUCATION/TRAINING PROGRAM

## 2022-12-22 PROCEDURE — 83036 HEMOGLOBIN GLYCOSYLATED A1C: CPT | Performed by: STUDENT IN AN ORGANIZED HEALTH CARE EDUCATION/TRAINING PROGRAM

## 2022-12-22 PROCEDURE — 3044F HG A1C LEVEL LT 7.0%: CPT | Performed by: STUDENT IN AN ORGANIZED HEALTH CARE EDUCATION/TRAINING PROGRAM

## 2022-12-22 PROCEDURE — 3078F DIAST BP <80 MM HG: CPT | Performed by: STUDENT IN AN ORGANIZED HEALTH CARE EDUCATION/TRAINING PROGRAM

## 2022-12-22 RX ORDER — SEMAGLUTIDE 1.34 MG/ML
1 INJECTION, SOLUTION SUBCUTANEOUS WEEKLY
Qty: 3 ML | Refills: 1 | Status: SHIPPED | OUTPATIENT
Start: 2022-12-22 | End: 2022-12-22

## 2022-12-22 RX ORDER — SEMAGLUTIDE 1.34 MG/ML
1 INJECTION, SOLUTION SUBCUTANEOUS WEEKLY
Qty: 3 ML | Refills: 1 | Status: SHIPPED | OUTPATIENT
Start: 2022-12-22

## 2022-12-22 RX ORDER — SEMAGLUTIDE 2.68 MG/ML
2 INJECTION, SOLUTION SUBCUTANEOUS WEEKLY
Qty: 3 ML | Refills: 1 | Status: CANCELLED | OUTPATIENT
Start: 2022-12-22

## 2022-12-22 SDOH — ECONOMIC STABILITY: FOOD INSECURITY: WITHIN THE PAST 12 MONTHS, THE FOOD YOU BOUGHT JUST DIDN'T LAST AND YOU DIDN'T HAVE MONEY TO GET MORE.: NEVER TRUE

## 2022-12-22 SDOH — ECONOMIC STABILITY: FOOD INSECURITY: WITHIN THE PAST 12 MONTHS, YOU WORRIED THAT YOUR FOOD WOULD RUN OUT BEFORE YOU GOT MONEY TO BUY MORE.: NEVER TRUE

## 2022-12-22 ASSESSMENT — ENCOUNTER SYMPTOMS
CONSTIPATION: 0
NAUSEA: 0
WHEEZING: 0
VOMITING: 0
EYE DISCHARGE: 0
CHEST TIGHTNESS: 0
DIARRHEA: 0
COUGH: 0
SORE THROAT: 0
SHORTNESS OF BREATH: 0
ABDOMINAL PAIN: 0

## 2022-12-22 ASSESSMENT — SOCIAL DETERMINANTS OF HEALTH (SDOH): HOW HARD IS IT FOR YOU TO PAY FOR THE VERY BASICS LIKE FOOD, HOUSING, MEDICAL CARE, AND HEATING?: NOT HARD AT ALL

## 2022-12-22 ASSESSMENT — PATIENT HEALTH QUESTIONNAIRE - PHQ9
SUM OF ALL RESPONSES TO PHQ QUESTIONS 1-9: 0
SUM OF ALL RESPONSES TO PHQ9 QUESTIONS 1 & 2: 0
SUM OF ALL RESPONSES TO PHQ QUESTIONS 1-9: 0
SUM OF ALL RESPONSES TO PHQ QUESTIONS 1-9: 0
2. FEELING DOWN, DEPRESSED OR HOPELESS: 0
SUM OF ALL RESPONSES TO PHQ QUESTIONS 1-9: 0
1. LITTLE INTEREST OR PLEASURE IN DOING THINGS: 0

## 2022-12-22 NOTE — PROGRESS NOTES
[address] PRIMARY CARE  [address] [place] [address]  Dept: [phone]  Dept Fax: [phone]    Ina Javier is a 62 y.o. female who is a Established patient, who presents today for her medical conditions/complaints as noted below:  Chief Complaint   Patient presents with    Diabetes         HPI:     She is here today to follow-up on diabetes and hypertension. She says that the Ozempic 2 mg dose was very expensive so she stayed on the 1 mg dose and it has been working well for her. She is also been continuing to lose weight. She says that her blood pressures were well controlled so she stopped taking the amlodipine and her blood pressure remained normal.  She is also not been taking atorvastatin. She would wait to see her repeat labs to see if her lipids are normal.  Due for lung cancer screening and mammogram.          Hemoglobin A1C (%)   Date Value   12/22/2022 5.9   06/07/2022 6.3   03/07/2022 5.8             ( goal A1Cis < 7)   Microalb/Crt.  Ratio (mcg/mg creat)   Date Value   10/12/2021 CANNOT BE CALCULATED     LDL Cholesterol (mg/dL)   Date Value   10/12/2021 114     LDL Calculated (mg/dL)   Date Value   07/20/2020 77   07/10/2019 87   08/29/2018 82       (goal LDL is <100)   AST (U/L)   Date Value   10/12/2021 16     ALT (U/L)   Date Value   10/12/2021 25     BUN (mg/dL)   Date Value   10/12/2021 16     BP Readings from Last 3 Encounters:   12/22/22 113/71   06/07/22 111/65   03/07/22 136/82          (goal 120/80)    Past Medical History:   Diagnosis Date    Abnormal weight gain 5/14/2012    Back pain 4/18/2012    Cervical disc disease     Chronic back pain     Diabetes mellitus (HCC)     Fibromyalgia     Hypertension     Labile blood pressure 3/22/2014    Spasm of muscle 4/18/2012    Tachycardia 3/21/2014      Past Surgical History:   Procedure Laterality Date    BREAST ENHANCEMENT SURGERY      CERVICAL [facility] SURGERY  03/21/2014    ANTERIOR CERVICAL Fabiana Natarajan ---saw  in [place]  then  was done at [address] [place] [address]  2021    C3-C4 ACDF    CERVICAL DISCECTOMY      HEMORRHOID SURGERY      OTHER SURGICAL HISTORY  2018    fistulotomy repair, Dr Shankar Choi History   Problem Relation Age of Onset    Diabetes Mother     Heart Disease Mother         had CABG x 2    Other Mother         peripheral arterial disease    Diabetes Father     High Blood Pressure Father     Kidney Disease Father     Heart Failure Father     Stroke Brother         was 47years old    Diabetes type 2  Maternal Grandmother     Heart Disease Maternal Grandmother          at 80    Heart Attack Maternal Grandfather          in his 42's    Diabetes Paternal Grandmother     Osteoarthritis Paternal Grandfather     Breast Cancer Neg Hx     Uterine Cancer Neg Hx     Colon Cancer Neg Hx     Ovarian Cancer Neg Hx        Social History     Tobacco Use    Smoking status: Light Smoker     Packs/day: 1.00     Years: 30.00     Pack years: 30.00     Types: Cigarettes     Start date: 1992     Last attempt to quit: 2011     Years since quittin.1    Smokeless tobacco: Never    Tobacco comments:     e-cig with lowest nicotine   Substance Use Topics    Alcohol use: No      Current Outpatient Medications   Medication Sig Dispense Refill    Semaglutide, 1 MG/DOSE, (OZEMPIC, 1 MG/DOSE,) 4 MG/3ML SOPN Inject 1 mg into the skin once a week 3 mL 1    losartan (COZAAR) 100 MG tablet take 1 tablet by mouth once daily 90 tablet 1    hydroCHLOROthiazide (HYDRODIURIL) 25 MG tablet TAKE 1 TABLET EVERY MORNING 90 tablet 1    Multiple Vitamins-Minerals (MULTIVITAMIN ADULTS PO) Take by mouth      fentaNYL (DURAGESIC) 25 MCG/HR apply 1 patch TO SKIN  AND REPLACE every 72 hours      meloxicam (MOBIC) 7.5 MG tablet Take 1 tablet by mouth 2 times daily 60 tablet 0    gabapentin (NEURONTIN) 100 MG capsule Take 100 mg by mouth 3 times daily. FLUoxetine (PROZAC) 40 MG capsule TAKE 1 CAPSULE DAILY 90 capsule 1    metaxalone (SKELAXIN) 800 MG tablet take 1 tablet by mouth twice a day      blood glucose monitor strips Test 2 times a day & as needed for symptoms of irregular blood glucose. 100 strip 5    Lancets MISC Please dispense lancets covered per insurance 100 each 3    magnesium oxide (MAG-OX) 400 MG tablet Take 400 mg by mouth daily      Saccharomyces boulardii (PROBIOTIC) 250 MG CAPS Take 250 mg by mouth daily      Blood Glucose Monitoring Suppl (ONE TOUCH ULTRA 2) w/Device KIT USE TO CHECK BLOOD SUGAR AS DIRECTED  0    Melatonin 1 MG Take 3 tablets by mouth daily as needed (sleep) 90 tablet 0    Cholecalciferol (VITAMIN D) 2000 UNITS CAPS capsule Take 1 capsule by mouth daily. Omega 3 1000 MG CAPS Take  by mouth daily. Coenzyme Q10 (COQ10) 200 MG CAPS Take 200 mg by mouth daily (Patient not taking: Reported on 12/22/2022)       No current facility-administered medications for this visit.      Allergies   Allergen Reactions    Pregabalin      Other reaction(s): hand/foot edema    Amaryl [Glimepiride] Other (See Comments)     Low blood sugar    Cyclobenzaprine      Other reaction(s): sedation, constiatpion, dry mouth    Penicillins     Duloxetine Hcl      Other reaction(s): weight gain    Gabapentin Rash       Health Maintenance   Topic Date Due    Hepatitis B vaccine (1 of 3 - Risk 3-dose series) Never done    Diabetic retinal exam  09/23/2011    Low dose CT lung screening  Never done    Shingles vaccine (2 of 2) 12/23/2020    COVID-19 Vaccine (3 - Booster) 05/26/2021    Diabetic microalbuminuria test  10/12/2022    Lipids  10/12/2022    Flu vaccine (1) 12/22/2023 (Originally 8/1/2022)    Pneumococcal 0-64 years Vaccine (1 - PCV) 12/22/2023 (Originally 3/30/1971)    Breast cancer screen  01/30/2023    Diabetic foot exam  06/07/2023    Depression Screen  06/07/2023    A1C test (Diabetic or Prediabetic) 12/22/2023    Cervical cancer screen  09/04/2024    Colorectal Cancer Screen  03/21/2025    DTaP/Tdap/Td vaccine (2 - Td or Tdap) 08/28/2025    Hepatitis C screen  Completed    HIV screen  Completed    Hepatitis A vaccine  Aged Out    Hib vaccine  Aged Out    Meningococcal (ACWY) vaccine  Aged Out       Subjective:     Review of Systems   Constitutional:  Negative for appetite change, fatigue and fever. HENT:  Negative for congestion, ear pain, hearing loss and sore throat. Eyes:  Negative for discharge and visual disturbance. Respiratory:  Negative for cough, chest tightness, shortness of breath and wheezing. Cardiovascular:  Negative for chest pain, palpitations and leg swelling. Gastrointestinal:  Negative for abdominal pain, constipation, diarrhea, nausea and vomiting. Genitourinary:  Negative for flank pain, frequency, hematuria and urgency. Musculoskeletal:  Negative for arthralgias, gait problem, joint swelling and myalgias. Skin: Negative. Neurological:  Negative for dizziness, weakness, numbness and headaches. Psychiatric/Behavioral: Negative. Negative for dysphoric mood. The patient is not nervous/anxious. Objective:     Physical Exam  Vitals reviewed. Constitutional:       Appearance: Normal appearance. She is normal weight. HENT:      Head: Normocephalic and atraumatic. Nose: Nose normal.      Mouth/Throat:      Mouth: Mucous membranes are moist.      Pharynx: Oropharynx is clear. Eyes:      Extraocular Movements: Extraocular movements intact. Conjunctiva/sclera: Conjunctivae normal.      Pupils: Pupils are equal, round, and reactive to light. Cardiovascular:      Rate and Rhythm: Normal rate and regular rhythm. Heart sounds: Normal heart sounds. No murmur heard. No gallop. Pulmonary:      Effort: Pulmonary effort is normal. No respiratory distress. Breath sounds: Normal breath sounds. No stridor. No wheezing.    Abdominal:      General: Bowel sounds are normal. There is no distension. Palpations: Abdomen is soft. Tenderness: There is no abdominal tenderness. There is no guarding or rebound. Musculoskeletal:         General: No swelling or tenderness. Normal range of motion. Cervical back: Normal range of motion and neck supple. Skin:     General: Skin is warm and dry. Coloration: Skin is not jaundiced. Findings: No rash. Neurological:      General: No focal deficit present. Mental Status: She is alert and oriented to person, place, and time. Psychiatric:         Mood and Affect: Mood normal.         Behavior: Behavior normal.         Thought Content: Thought content normal.         Judgment: Judgment normal.     /71   Pulse 85   Temp 97.1 °F (36.2 °C)   Ht 5' (1.524 m)   Wt 131 lb 6.4 oz (59.6 kg)   SpO2 98%   BMI 25.66 kg/m²     Assessment/Plan:   1. Type 2 diabetes mellitus without complication, without long-term current use of insulin ([facility])  -     POCT glycosylated hemoglobin (Hb A1C)  -     Semaglutide, 1 MG/DOSE, (OZEMPIC, 1 MG/DOSE,) 4 MG/3ML SOPN; Inject 1 mg into the skin once a week, Disp-3 mL, R-1Normal  -     Microalbumin, Ur; Future  -     Hemoglobin A1C; Future  2. Essential hypertension  -     CBC with Auto Differential; Future  -     Comprehensive Metabolic Panel, Fasting; Future  -     Lipid, Fasting; Future  -     TSH with Reflex; Future  3. Personal history of tobacco use  -     KS VISIT TO DISCUSS LUNG CA SCREEN W LDCT  -     CT Lung Screen (Annual); Future  4. Encounter for screening mammogram for malignant neoplasm of breast  -     MAURY DIGITAL SCREEN W OR WO CAD BILATERAL; Future      Type 2 diabetes-POCT A1c today 5.9, continue Ozempic 1 mg daily. No longer taking metformin. Stopped taking atorvastatin. Repeat labs ordered.     Hypertension-controlled, continue losartan 100 mg daily and hydrochlorothiazide 25 mg daily, no longer taking amlodipine          Return in about 3 months (around 3/22/2023) for Follow up DM/HTN. Orders Placed This Encounter   Procedures    CT Lung Screen (Annual)     Age: Patient is 62 y.o. Smoking History: Social History    Tobacco Use      Smoking status: Light Smoker        Packs/day: 1.00        Years: 30.00        Pack years: 30        Types: Cigarettes        Start date: 1992        Last attempt to quit: 2011        Years since quittin.1      Smokeless tobacco: Never      Tobacco comments: e-cig with lowest nicotine    Vaping Use      Vaping Use: Every day        Start date: 2013        Substances: Always    Alcohol use: No    Drug use: No   Pack years: 30    Date of last lung cancer screening: No previous lung cancer screening exam     Standing Status:   Future     Standing Expiration Date:   2024     Order Specific Question:   Is there documentation of shared decision making? Answer:   Yes     Order Specific Question:   Is this a low dose CT or a routine CT? Answer:   Low Dose CT [1]     Order Specific Question:   Is this the first (baseline) CT or an annual exam?     Answer:   Baseline [1]     Order Specific Question:   Does the patient show any signs or symptoms of lung cancer? Answer:   No     Order Specific Question:   Smoking Status? Answer:   Light Smoker [10]     Order Specific Question:   Smoking packs per day? Answer:   1     Order Specific Question:   Years smoking?      Answer:   27    MAURY DIGITAL SCREEN W OR WO CAD BILATERAL     Standing Status:   Future     Standing Expiration Date:   2023     Order Specific Question:   Reason for exam:     Answer:   screening Z12.31    CBC with Auto Differential     Standing Status:   Future     Standing Expiration Date:   2023    Comprehensive Metabolic Panel, Fasting     Standing Status:   Future     Standing Expiration Date:   2023    Lipid, Fasting     Standing Status:   Future     Standing Expiration Date:   2023    TSH with Reflex Standing Status:   Future     Standing Expiration Date:   6/22/2023    Microalbumin, Ur     Standing Status:   Future     Standing Expiration Date:   3/22/2023    Hemoglobin A1C     Standing Status:   Future     Standing Expiration Date:   6/22/2023    POCT glycosylated hemoglobin (Hb A1C)    [place] VISIT TO DISCUSS LUNG CA SCREEN W LDCT     Orders Placed This Encounter   Medications    DISCONTD: Semaglutide, 1 MG/DOSE, (OZEMPIC, 1 MG/DOSE,) 4 MG/3ML SOPN     Sig: Inject 1 mg into the skin once a week     Dispense:  3 mL     Refill:  1    Semaglutide, 1 MG/DOSE, (OZEMPIC, 1 MG/DOSE,) 4 MG/3ML SOPN     Sig: Inject 1 mg into the skin once a week     Dispense:  3 mL     Refill:  1       Patient given educational materials - see patient instructions. Discussed use, benefit, and side effects of prescribed medications. All patientquestions answered. Pt voiced understanding. Reviewed health maintenance. Instructedto continue current medications, diet and exercise. Patient agreed with treatmentplan. Follow up as directed. Electronically signed by Rain Decker MD on 12/22/2022 at 2:48 PMDiscussed with the patient the current USPSTF guidelines released March 9, 2021 for screening for lung cancer. For adults aged 48 to [de-identified] years who have a 20 pack-year smoking history and currently smoke or have quit within the past 15 years the grade B recommendation is to:  Screen for lung cancer with low-dose computed tomography (LDCT) every year. Stop screening once a person has not smoked for 15 years or has a health problem that limits life expectancy or the ability to have lung surgery. The patient  reports that she has been smoking cigarettes. She started smoking about 30 years ago. She has a 30.00 pack-year smoking history. She has never used smokeless tobacco.. Discussed with patient the risks and benefits of screening, including over-diagnosis, false positive rate, and total radiation exposure.   The patient currently exhibits no signs or symptoms suggestive of lung cancer. Discussed with patient the importance of compliance with yearly annual lung cancer screenings and willingness to undergo diagnosis and treatment if screening scan is positive. In addition, the patient was counseled regarding the importance of remaining smoke free and/or total smoking cessation.     Also reviewed the following if the patient has Medicare that as of February 10, 2022, Medicare only covers LDCT screening in patients aged 51-72 with at least a 20 pack-year smoking history who currently smoke or have quit in the last 15 years

## 2022-12-22 NOTE — PATIENT INSTRUCTIONS
decide your lung cancer risk. What are the risks of screening? CT screening for lung cancer isn't perfect. It can show an abnormal result when it turns out there wasn't any cancer. This is called a false-positive result. This means you may need more tests to make sure you don't have cancer. These tests can be harmful and cause a lot of worry. These tests may include more CT scans and invasive testing like a lung biopsy. In a biopsy, the doctor takes a sample of tissue from inside your lung so it can be looked at under a microscope. A biopsy is the only way to tell if you have lung cancer. If the biopsy finds cancer, you and your doctor will have to decide how or whether to treat it. Some lung cancers found on CT scans are harmless and would not have caused a problem if they had not been found through screening. But because doctors can't tell which ones will turn out to be harmless, most will be treated. This means that you may get treatment--including surgery, radiation, or chemotherapy--that you don't need. There is a risk of damage to cells or tissue from being exposed to radiation, including the small amounts used in CTs, X-rays, and other medical tests. Over time, exposure to radiation may cause cancer and other health problems. But in most cases, the risk of getting cancer from being exposed to small amounts of radiation is low. It's not a reason to avoid these tests for most people. What are the benefits of screening? Your scan may be normal (negative). For some people who are at higher risk, screening lowers the chance of dying of lung cancer. How much and how long you smoked helps to determine your risk level. Screening can find some cancers early, when treatment may be more likely to work. What happens after screening? The results of your CT scan will be sent to your doctor. Someone from your care team will explain the results of your scan and answer any questions you may have.  If you need any follow-up, he or she will help you understand what to do next. After a lung cancer screening, you can go back to your usual activities right away. A lung cancer screening test can't tell if you have lung cancer. If your results are positive, your doctor can't tell whether an abnormal finding is a harmless nodule, cancer, or something else without doing more tests. What can you do to help prevent lung cancer? Some lung cancers can't be prevented. But if you smoke, quitting smoking is the best step you can take to prevent lung cancer. If you want to quit, your doctor can recommend medicines or other ways to help. Follow-up care is a key part of your treatment and safety. Be sure to make and go to all appointments, and call your doctor if you are having problems. It's also a good idea to know your test results and keep a list of the medicines you take. Where can you learn more? Go to http://www.samano.com/ and enter Q940 to learn more about \"Learning About Lung Cancer Screening. \"  Current as of: May 4, 2022               Content Version: 13.5  © 5740-7679 [employer], [employer]orporated. Care instructions adapted under license by [facility] ([facility]). If you have questions about a medical condition or this instruction, always ask your healthcare professional. [address] any warranty or liability for your use of this information.

## 2023-01-17 DIAGNOSIS — F41.9 ANXIETY: ICD-10-CM

## 2023-01-18 RX ORDER — FLUOXETINE HYDROCHLORIDE 20 MG/1
CAPSULE ORAL
Qty: 90 CAPSULE | Refills: 1 | Status: SHIPPED | OUTPATIENT
Start: 2023-01-18

## 2023-01-18 NOTE — TELEPHONE ENCOUNTER
Katy Bustos is calling to request a refill on the following medication(s):    Medication Request:  Requested Prescriptions     Pending Prescriptions Disp Refills    FLUoxetine (PROZAC) 20 MG capsule [Pharmacy Med Name: FLUOXETIN(P) CAP 20MG] 90 capsule 1     Sig: TAKE 1 CAPSULE DAILY       Last Visit Date (If Applicable):  44/35/3710    Next Visit Date:    3/22/2023

## 2023-01-20 DIAGNOSIS — F41.9 ANXIETY: ICD-10-CM

## 2023-01-20 RX ORDER — FLUOXETINE HYDROCHLORIDE 20 MG/1
CAPSULE ORAL
Qty: 90 CAPSULE | Refills: 1 | OUTPATIENT
Start: 2023-01-20

## 2023-01-27 RX ORDER — LOSARTAN POTASSIUM 100 MG/1
TABLET ORAL
Qty: 30 TABLET | Refills: 3 | Status: SHIPPED | OUTPATIENT
Start: 2023-01-27

## 2023-01-27 NOTE — TELEPHONE ENCOUNTER
Chadd Leal is calling to request a refill on the following medication(s):    Medication Request:  Requested Prescriptions     Pending Prescriptions Disp Refills    losartan (COZAAR) 100 MG tablet [Pharmacy Med Name: LOSARTAN POTASSIUM 100 MG TAB] 30 tablet 3     Sig: TAKE 1 TABLET BY MOUTH EVERY DAY       Last Visit Date (If Applicable):  82/48/7238    Next Visit Date:    3/22/2023

## 2023-02-15 DIAGNOSIS — E11.9 TYPE 2 DIABETES MELLITUS WITHOUT COMPLICATION, WITHOUT LONG-TERM CURRENT USE OF INSULIN (HCC): ICD-10-CM

## 2023-02-15 RX ORDER — SEMAGLUTIDE 1.34 MG/ML
INJECTION, SOLUTION SUBCUTANEOUS
Qty: 3 ML | Refills: 1 | Status: SHIPPED | OUTPATIENT
Start: 2023-02-15

## 2023-02-15 RX ORDER — LOSARTAN POTASSIUM 100 MG/1
TABLET ORAL
Qty: 90 TABLET | Refills: 1 | Status: SHIPPED | OUTPATIENT
Start: 2023-02-15

## 2023-02-15 NOTE — TELEPHONE ENCOUNTER
Brandan Segovia is calling to request a refill on the following medication(s):    Medication Request:  Requested Prescriptions     Pending Prescriptions Disp Refills    OZEMPIC, 1 MG/DOSE, 4 MG/3ML SOPN [Pharmacy Med Name: OZEMPIC 1 MG/DOSE (4 MG/3 ML)]  1     Sig: INJECT 1MG INTO THE SKIN ONCE A WEEK    losartan (COZAAR) 100 MG tablet [Pharmacy Med Name: LOSARTAN POTASSIUM 100 MG TAB] 90 tablet 1     Sig: TAKE 1 TABLET BY MOUTH EVERY DAY       Last Visit Date (If Applicable):  26/68/1152    Next Visit Date:    3/22/2023

## 2023-04-07 DIAGNOSIS — E11.9 TYPE 2 DIABETES MELLITUS WITHOUT COMPLICATION, WITHOUT LONG-TERM CURRENT USE OF INSULIN (HCC): ICD-10-CM

## 2023-04-07 RX ORDER — SEMAGLUTIDE 1.34 MG/ML
INJECTION, SOLUTION SUBCUTANEOUS
Qty: 3 ML | Refills: 1 | Status: SHIPPED | OUTPATIENT
Start: 2023-04-07

## 2023-04-07 NOTE — TELEPHONE ENCOUNTER
Linda Vargas is calling to request a refill on the following medication(s):    Medication Request:  Requested Prescriptions     Pending Prescriptions Disp Refills    OZEMPIC, 1 MG/DOSE, 4 MG/3ML SOPN [Pharmacy Med Name: OZEMPIC 1 MG/DOSE (4 MG/3 ML)] 3 mL 1     Sig: INJECT 1MG INTO THE SKIN ONCE A WEEK       Last Visit Date (If Applicable):  49/47/7710    Next Visit Date:    Visit date not found

## 2023-05-16 LAB
BUN BLDV-MCNC: 26 MG/DL
CALCIUM SERPL-MCNC: 9.6 MG/DL
CHLORIDE BLD-SCNC: 99 MMOL/L
CO2: 31 MMOL/L
CREAT SERPL-MCNC: 0.7 MG/DL
GFR AFRICAN AMERICAN: 104 ML/M1.7
GFR NON-AFRICAN AMERICAN: 85.9 ML/M1.7
GLUCOSE: 120 MG/DL
POTASSIUM SERPL-SCNC: 4.5 MMOL/L
SODIUM BLD-SCNC: 141 MMOL/L

## 2023-05-26 DIAGNOSIS — E11.9 TYPE 2 DIABETES MELLITUS WITHOUT COMPLICATION, WITHOUT LONG-TERM CURRENT USE OF INSULIN (HCC): ICD-10-CM

## 2023-05-26 RX ORDER — SEMAGLUTIDE 1.34 MG/ML
INJECTION, SOLUTION SUBCUTANEOUS
Qty: 3 ML | Refills: 0 | Status: SHIPPED | OUTPATIENT
Start: 2023-05-26 | End: 2023-05-30 | Stop reason: SDUPTHER

## 2023-05-30 DIAGNOSIS — E11.9 TYPE 2 DIABETES MELLITUS WITHOUT COMPLICATION, WITHOUT LONG-TERM CURRENT USE OF INSULIN (HCC): ICD-10-CM

## 2023-05-30 RX ORDER — SEMAGLUTIDE 1.34 MG/ML
1 INJECTION, SOLUTION SUBCUTANEOUS WEEKLY
Qty: 3 ML | Refills: 1 | Status: SHIPPED | OUTPATIENT
Start: 2023-05-30

## 2023-05-30 NOTE — TELEPHONE ENCOUNTER
Meg Shah is calling to request a refill on the following medication(s):    Medication Request:  Requested Prescriptions     Pending Prescriptions Disp Refills    Semaglutide, 1 MG/DOSE, (OZEMPIC, 1 MG/DOSE,) 4 MG/3ML SOPN 3 mL 0       Last Visit Date (If Applicable):  Visit date not found    Next Visit Date:    Visit date not found

## 2023-07-01 DIAGNOSIS — F41.9 ANXIETY: ICD-10-CM

## 2023-07-03 RX ORDER — FLUOXETINE HYDROCHLORIDE 20 MG/1
CAPSULE ORAL
Qty: 90 CAPSULE | Refills: 1 | Status: SHIPPED | OUTPATIENT
Start: 2023-07-03

## 2023-07-03 NOTE — TELEPHONE ENCOUNTER
Chevy Pham is calling to request a refill on the following medication(s):    Medication Request:  Requested Prescriptions     Pending Prescriptions Disp Refills    FLUoxetine (PROZAC) 20 MG capsule [Pharmacy Med Name: FLUOXETIN(P) CAP 20MG] 90 capsule 1     Sig: TAKE 1 CAPSULE DAILY       Last Visit Date (If Applicable):  16/08/6844    Next Visit Date:    7/18/2023

## 2023-07-28 DIAGNOSIS — E11.9 TYPE 2 DIABETES MELLITUS WITHOUT COMPLICATION, WITHOUT LONG-TERM CURRENT USE OF INSULIN (HCC): ICD-10-CM

## 2023-07-28 RX ORDER — SEMAGLUTIDE 1.34 MG/ML
INJECTION, SOLUTION SUBCUTANEOUS
Qty: 3 ML | Refills: 0 | Status: SHIPPED | OUTPATIENT
Start: 2023-07-28 | End: 2023-09-18 | Stop reason: SDUPTHER

## 2023-07-28 NOTE — TELEPHONE ENCOUNTER
Tay Martin is calling to request a refill on the following medication(s):    Medication Request:  Requested Prescriptions     Pending Prescriptions Disp Refills    OZEMPIC, 1 MG/DOSE, 4 MG/3ML SOPN [Pharmacy Med Name: OZEMPIC 4 MG/3 ML (1 MG/DOSE)]       Sig: INJECT 1 MG INTO THE SKIN ONE TIME PER WEEK       Last Visit Date (If Applicable):  59/41/3854    Next Visit Date:    Visit date not found

## 2023-08-02 DIAGNOSIS — I10 ESSENTIAL HYPERTENSION: ICD-10-CM

## 2023-08-02 RX ORDER — HYDROCHLOROTHIAZIDE 25 MG/1
TABLET ORAL
Qty: 90 TABLET | Refills: 0 | Status: SHIPPED | OUTPATIENT
Start: 2023-08-02

## 2023-08-02 NOTE — TELEPHONE ENCOUNTER
[employer] is calling to request a refill on the following medication(s):    Medication Request:  Requested Prescriptions     Pending Prescriptions Disp Refills    hydroCHLOROthiazide (HYDRODIURIL) 25 MG tablet [Pharmacy Med Name: HYDROCHLOROTHIAZIDE 25 MG TAB] 90 tablet 1     Sig: take 1 tablet by mouth every morning       Last Visit Date (If Applicable):  06/35/6395    Next Visit Date:    Visit date not found

## 2023-09-14 DIAGNOSIS — E11.9 TYPE 2 DIABETES MELLITUS WITHOUT COMPLICATION, WITHOUT LONG-TERM CURRENT USE OF INSULIN (HCC): ICD-10-CM

## 2023-09-14 RX ORDER — SEMAGLUTIDE 1.34 MG/ML
INJECTION, SOLUTION SUBCUTANEOUS
OUTPATIENT
Start: 2023-09-14

## 2023-09-14 NOTE — TELEPHONE ENCOUNTER
Hiro Hart is calling to request a refill on the following medication(s):    Medication Request:  Requested Prescriptions     Pending Prescriptions Disp Refills    OZEMPIC, 1 MG/DOSE, 4 MG/3ML SOPN [Pharmacy Med Name: OZEMPIC 4 MG/3 ML (1 MG/DOSE)]       Sig: INJECT 1 MG INTO THE SKIN ONE TIME PER WEEK       Last Visit Date (If Applicable):  04/00/5421    Next Visit Date:    Visit date not found

## 2023-09-18 ENCOUNTER — OFFICE VISIT (OUTPATIENT)
Dept: FAMILY MEDICINE CLINIC | Age: 58
End: 2023-09-18
Payer: MEDICARE

## 2023-09-18 VITALS
BODY MASS INDEX: 28.07 KG/M2 | OXYGEN SATURATION: 96 % | HEART RATE: 80 BPM | TEMPERATURE: 97 F | DIASTOLIC BLOOD PRESSURE: 60 MMHG | SYSTOLIC BLOOD PRESSURE: 120 MMHG | HEIGHT: 60 IN | WEIGHT: 143 LBS

## 2023-09-18 DIAGNOSIS — F41.1 GAD (GENERALIZED ANXIETY DISORDER): ICD-10-CM

## 2023-09-18 DIAGNOSIS — Z12.31 ENCOUNTER FOR SCREENING MAMMOGRAM FOR MALIGNANT NEOPLASM OF BREAST: ICD-10-CM

## 2023-09-18 DIAGNOSIS — E11.9 TYPE 2 DIABETES MELLITUS WITHOUT COMPLICATION, WITHOUT LONG-TERM CURRENT USE OF INSULIN (HCC): ICD-10-CM

## 2023-09-18 DIAGNOSIS — I10 ESSENTIAL HYPERTENSION: Primary | ICD-10-CM

## 2023-09-18 DIAGNOSIS — R53.83 FATIGUE, UNSPECIFIED TYPE: ICD-10-CM

## 2023-09-18 PROCEDURE — G8419 CALC BMI OUT NRM PARAM NOF/U: HCPCS | Performed by: STUDENT IN AN ORGANIZED HEALTH CARE EDUCATION/TRAINING PROGRAM

## 2023-09-18 PROCEDURE — 4004F PT TOBACCO SCREEN RCVD TLK: CPT | Performed by: STUDENT IN AN ORGANIZED HEALTH CARE EDUCATION/TRAINING PROGRAM

## 2023-09-18 PROCEDURE — 99214 OFFICE O/P EST MOD 30 MIN: CPT | Performed by: STUDENT IN AN ORGANIZED HEALTH CARE EDUCATION/TRAINING PROGRAM

## 2023-09-18 PROCEDURE — 3017F COLORECTAL CA SCREEN DOC REV: CPT | Performed by: STUDENT IN AN ORGANIZED HEALTH CARE EDUCATION/TRAINING PROGRAM

## 2023-09-18 PROCEDURE — 3046F HEMOGLOBIN A1C LEVEL >9.0%: CPT | Performed by: STUDENT IN AN ORGANIZED HEALTH CARE EDUCATION/TRAINING PROGRAM

## 2023-09-18 PROCEDURE — 2022F DILAT RTA XM EVC RTNOPTHY: CPT | Performed by: STUDENT IN AN ORGANIZED HEALTH CARE EDUCATION/TRAINING PROGRAM

## 2023-09-18 PROCEDURE — G8427 DOCREV CUR MEDS BY ELIG CLIN: HCPCS | Performed by: STUDENT IN AN ORGANIZED HEALTH CARE EDUCATION/TRAINING PROGRAM

## 2023-09-18 PROCEDURE — 3074F SYST BP LT 130 MM HG: CPT | Performed by: STUDENT IN AN ORGANIZED HEALTH CARE EDUCATION/TRAINING PROGRAM

## 2023-09-18 PROCEDURE — 3078F DIAST BP <80 MM HG: CPT | Performed by: STUDENT IN AN ORGANIZED HEALTH CARE EDUCATION/TRAINING PROGRAM

## 2023-09-18 RX ORDER — SEMAGLUTIDE 1.34 MG/ML
INJECTION, SOLUTION SUBCUTANEOUS
Qty: 3 ML | Refills: 1 | Status: SHIPPED | OUTPATIENT
Start: 2023-09-18

## 2023-09-18 SDOH — ECONOMIC STABILITY: TRANSPORTATION INSECURITY
IN THE PAST 12 MONTHS, HAS LACK OF TRANSPORTATION KEPT YOU FROM MEETINGS, WORK, OR FROM GETTING THINGS NEEDED FOR DAILY LIVING?: NO

## 2023-09-18 SDOH — ECONOMIC STABILITY: FOOD INSECURITY: WITHIN THE PAST 12 MONTHS, YOU WORRIED THAT YOUR FOOD WOULD RUN OUT BEFORE YOU GOT MONEY TO BUY MORE.: NEVER TRUE

## 2023-09-18 SDOH — ECONOMIC STABILITY: INCOME INSECURITY: IN THE LAST 12 MONTHS, WAS THERE A TIME WHEN YOU WERE NOT ABLE TO PAY THE MORTGAGE OR RENT ON TIME?: NO

## 2023-09-18 SDOH — ECONOMIC STABILITY: HOUSING INSECURITY
IN THE LAST 12 MONTHS, WAS THERE A TIME WHEN YOU DID NOT HAVE A STEADY PLACE TO SLEEP OR SLEPT IN A SHELTER (INCLUDING NOW)?: NO

## 2023-09-18 SDOH — ECONOMIC STABILITY: TRANSPORTATION INSECURITY
IN THE PAST 12 MONTHS, HAS THE LACK OF TRANSPORTATION KEPT YOU FROM MEDICAL APPOINTMENTS OR FROM GETTING MEDICATIONS?: NO

## 2023-09-18 SDOH — ECONOMIC STABILITY: FOOD INSECURITY: WITHIN THE PAST 12 MONTHS, THE FOOD YOU BOUGHT JUST DIDN'T LAST AND YOU DIDN'T HAVE MONEY TO GET MORE.: NEVER TRUE

## 2023-09-18 ASSESSMENT — PATIENT HEALTH QUESTIONNAIRE - PHQ9
2. FEELING DOWN, DEPRESSED OR HOPELESS: 0
SUM OF ALL RESPONSES TO PHQ QUESTIONS 1-9: 0
SUM OF ALL RESPONSES TO PHQ QUESTIONS 1-9: 0
SUM OF ALL RESPONSES TO PHQ9 QUESTIONS 1 & 2: 0
1. LITTLE INTEREST OR PLEASURE IN DOING THINGS: 0
SUM OF ALL RESPONSES TO PHQ QUESTIONS 1-9: 0
SUM OF ALL RESPONSES TO PHQ QUESTIONS 1-9: 0

## 2023-09-18 ASSESSMENT — SOCIAL DETERMINANTS OF HEALTH (SDOH): HOW HARD IS IT FOR YOU TO PAY FOR THE VERY BASICS LIKE FOOD, HOUSING, MEDICAL CARE, AND HEATING?: NOT HARD AT ALL

## 2023-09-19 PROBLEM — Z87.891 FORMER SMOKER: Status: ACTIVE | Noted: 2023-09-19

## 2023-09-19 PROBLEM — Z72.0 TOBACCO USE: Chronic | Status: RESOLVED | Noted: 2020-09-14 | Resolved: 2023-09-19

## 2023-09-19 ASSESSMENT — ENCOUNTER SYMPTOMS
COUGH: 0
DIARRHEA: 0
WHEEZING: 0
CONSTIPATION: 0
VOMITING: 0
SHORTNESS OF BREATH: 0
EYE DISCHARGE: 0
NAUSEA: 0
CHEST TIGHTNESS: 0
SORE THROAT: 0
ABDOMINAL PAIN: 0

## 2023-09-20 NOTE — PROGRESS NOTES
Requested
BREAST ENHANCEMENT SURGERY      CERVICAL [facility] SURGERY  2014    ANTERIOR CERVICAL MICRODISECTOMY AND FUSION C 6-7--saw  in [place]  then  was done at [address] [place] [address]  2021    C3-C4 ACDF    CERVICAL DISCECTOMY      HEMORRHOID SURGERY      OTHER SURGICAL HISTORY  2018    fistulotomy repair, Dr Tee Staples History   Problem Relation Age of Onset    Diabetes Mother     Heart Disease Mother         had CABG x 2    Other Mother         peripheral arterial disease    Diabetes Father     High Blood Pressure Father     Kidney Disease Father     Heart Failure Father     Stroke Brother         was 47years old    Diabetes type 2  Maternal Grandmother     Heart Disease Maternal Grandmother          at [facility]    Heart Attack Maternal Grandfather          in his 42's    Diabetes Paternal Grandmother     Osteoarthritis Paternal Grandfather     Breast Cancer Neg Hx     Uterine Cancer Neg Hx     Colon Cancer Neg Hx     Ovarian Cancer Neg Hx        Social History     Tobacco Use    Smoking status: Light Smoker     Packs/day: 1.00     Years: 30.00     Additional pack years: 0.00     Total pack years: 30.00     Types: Cigarettes     Start date: 1992     Last attempt to quit: 2011     Years since quittin.8    Smokeless tobacco: Never    Tobacco comments:     e-cig with lowest nicotine   Substance Use Topics    Alcohol use: No      Current Outpatient Medications   Medication Sig Dispense Refill    Semaglutide, 1 MG/DOSE, (OZEMPIC, 1 MG/DOSE,) 4 MG/3ML SOPN INJECT 1 MG INTO THE SKIN ONE TIME PER WEEK 3 mL 1    hydroCHLOROthiazide (HYDRODIURIL) 25 MG tablet take 1 tablet by mouth every morning 90 tablet 0    FLUoxetine (PROZAC) 20 MG capsule TAKE 1 CAPSULE DAILY 90 capsule 1    losartan (COZAAR) 100 MG tablet TAKE 1 TABLET BY MOUTH EVERY DAY 90 tablet 1    Multiple Vitamins-Minerals (MULTIVITAMIN ADULTS PO) Take by mouth

## 2023-09-21 LAB
ALBUMIN: 4.3 G/DL
ALK PHOSPHATASE: 62 U/L
ALT SERPL-CCNC: 19 U/L
AST SERPL-CCNC: 19 U/L
BASOPHILS ABSOLUTE: 0.09 X10^3UL
BASOPHILS RELATIVE PERCENT: 1.5 %
BILIRUB SERPL-MCNC: 1 MG/DL
BUN BLDV-MCNC: 17 MG/DL
CALCIUM SERPL-MCNC: 9.2 MG/DL
CHLORIDE BLD-SCNC: 102 MMOL/L
CHOLESTEROL/HDL RATIO: 5.2
CHOLESTEROL: 222 MG/DL
CO2: 30 MMOL/L
CREAT SERPL-MCNC: 0.79 MG/DL
CREATININE URINE: 139.9 MG/DL
EOSINOPHILS ABSOLUTE: 0.25 X10^3UL
EOSINOPHILS RELATIVE PERCENT: 4.1 %
ERYTHROCYTE [DISTWIDTH] IN BLOOD BY AUTOMATED COUNT: 40.9 FL
EST. AVERAGE GLUCOSE BLD GHB EST-MCNC: 131 MG/DL
FREE T4 REFLEX: YES
GFR AFRICAN AMERICAN: 90.4 ML/M1.7
GFR NON-AFRICAN AMERICAN: 74.7 ML/M1.7
GLUCOSE: 77 MG/DL
HBA1C MFR BLD: 6.2 %
HCT VFR BLD CALC: 38.7 %
HDLC SERPL-MCNC: 43 MG/DL
HEMOGLOBIN: 12.9 G/DL
IMMATURE GRANULOCYTES %: 0.3 %
IMMATURE GRANULOCYTES ABSOLUTE: 0.02 X10^3UL
LDL CHOLESTEROL CALCULATED: 158 MG/DL
LYMPHOCYTES ABSOLUTE: 1.89 X10^3UL
LYMPHOCYTES RELATIVE PERCENT: 31 %
MCH RBC QN AUTO: 30.9 PG
MCHC RBC AUTO-ENTMCNC: 33.3 G/DL
MCV RBC AUTO: 92.6 FL
MICROALBUMIN/CREAT 24H UR: 3.86 MG/DL
MICROALBUMIN/CREAT UR-RTO: 27.6 MG/G CR
MONOCYTES ABSOLUTE: 0.51 X10^3UL
MONOCYTES RELATIVE PERCENT: 8.4 %
NEUTROPHILS ABSOLUTE: 3.33 X10^3UL
PLATELETS: 272 X10^3UL
POTASSIUM SERPL-SCNC: 4.3 MMOL/L
RBC: 4.18 X10^6UL
SEGMENTED NEUTROPHILS RELATIVE PERCENT: 54.7 %
SODIUM BLD-SCNC: 143 MMOL/L
T4 FREE: 1.15 NG/DL
TOTAL PROTEIN: 7 G/DL
TRIGL SERPL-MCNC: 107 MG/DL
TSH SERPL DL<=0.05 MIU/L-ACNC: 0.03 MIU/L
URINE COLLECTION: ABNORMAL
VITAMIN B-12: >1000 PG/ML
VLDLC SERPL CALC-MCNC: 21 MG/DL
WBC: 6.09 X10^3UL

## 2023-09-22 DIAGNOSIS — F41.1 GAD (GENERALIZED ANXIETY DISORDER): ICD-10-CM

## 2023-09-22 DIAGNOSIS — R53.83 FATIGUE, UNSPECIFIED TYPE: ICD-10-CM

## 2023-09-22 DIAGNOSIS — E11.9 TYPE 2 DIABETES MELLITUS WITHOUT COMPLICATION, WITHOUT LONG-TERM CURRENT USE OF INSULIN (HCC): ICD-10-CM

## 2023-09-22 DIAGNOSIS — I10 ESSENTIAL HYPERTENSION: ICD-10-CM

## 2023-09-22 LAB
ALBUMIN SERPL-MCNC: 4.3 G/DL
ALP BLD-CCNC: 62 U/L
ALT SERPL-CCNC: 19 U/L
AST SERPL-CCNC: 19 U/L
AVERAGE GLUCOSE: 131
BASOPHILS ABSOLUTE: 0.09 /ΜL
BASOPHILS RELATIVE PERCENT: 1.5 %
BILIRUB SERPL-MCNC: 1 MG/DL (ref 0.1–1.4)
BUN BLDV-MCNC: 17 MG/DL
CALCIUM SERPL-MCNC: 9.2 MG/DL
CHLORIDE BLD-SCNC: 102 MMOL/L
CHOLESTEROL, FASTING: 222
CO2: 30 MMOL/L
CREAT SERPL-MCNC: 0.79 MG/DL
CREATININE URINE: 139.9 MG/DL
EOSINOPHILS ABSOLUTE: 0.25 /ΜL
EOSINOPHILS RELATIVE PERCENT: 4.1 %
GLUCOSE FASTING: 77 MG/DL
HBA1C MFR BLD: 6.2 %
HCT VFR BLD CALC: 38.7 % (ref 36–46)
HDLC SERPL-MCNC: 43 MG/DL (ref 35–70)
HEMOGLOBIN: 12.9 G/DL (ref 12–16)
LDL CHOLESTEROL CALCULATED: 158 MG/DL (ref 0–160)
LYMPHOCYTES ABSOLUTE: 1.89 /ΜL
LYMPHOCYTES RELATIVE PERCENT: 31 %
MCH RBC QN AUTO: 30.9 PG
MCHC RBC AUTO-ENTMCNC: 33.3 G/DL
MCV RBC AUTO: 92.6 FL
MICROALBUMIN/CREAT 24H UR: 3.86 MG/G{CREAT}
MONOCYTES ABSOLUTE: 0.51 /ΜL
MONOCYTES RELATIVE PERCENT: 8.4 %
NEUTROPHILS ABSOLUTE: 3.33 /ΜL
NEUTROPHILS RELATIVE PERCENT: 54.7 %
PDW BLD-RTO: 40.9 %
PLATELET # BLD: 272 K/ΜL
PMV BLD AUTO: NORMAL FL
POTASSIUM SERPL-SCNC: 4.3 MMOL/L
RBC # BLD: 4.18 10^6/ΜL
SODIUM BLD-SCNC: 143 MMOL/L
TOTAL PROTEIN: 7 G/DL (ref 6.4–8.2)
TRIGLYCERIDE, FASTING: 107
TSH SERPL DL<=0.05 MIU/L-ACNC: 0.03 UIU/ML
VITAMIN B-12: >1000
WBC # BLD: 6.09 10^3/ML

## 2023-10-13 DIAGNOSIS — E11.9 TYPE 2 DIABETES MELLITUS WITHOUT COMPLICATION, WITHOUT LONG-TERM CURRENT USE OF INSULIN (HCC): ICD-10-CM

## 2023-10-13 RX ORDER — SEMAGLUTIDE 1.34 MG/ML
INJECTION, SOLUTION SUBCUTANEOUS
Qty: 3 ML | Refills: 1 | Status: SHIPPED | OUTPATIENT
Start: 2023-10-13

## 2023-10-13 NOTE — TELEPHONE ENCOUNTER
Tahir Medeiros is calling to request a refill on the following medication(s):    Medication Request:  Requested Prescriptions     Pending Prescriptions Disp Refills    OZEMPIC, 1 MG/DOSE, 4 MG/3ML SOPN [Pharmacy Med Name: OZEMPIC 4 MG/3 ML (1 MG/DOSE)]       Sig: INJECT 1 MG INTO THE SKIN ONE TIME PER WEEK       Last Visit Date (If Applicable):  8/35/9237    Next Visit Date:    10/19/2023

## 2023-10-19 ENCOUNTER — OFFICE VISIT (OUTPATIENT)
Dept: FAMILY MEDICINE CLINIC | Age: 58
End: 2023-10-19
Payer: MEDICARE

## 2023-10-19 VITALS
HEART RATE: 78 BPM | HEIGHT: 60 IN | SYSTOLIC BLOOD PRESSURE: 118 MMHG | BODY MASS INDEX: 28.47 KG/M2 | WEIGHT: 145 LBS | OXYGEN SATURATION: 96 % | DIASTOLIC BLOOD PRESSURE: 72 MMHG

## 2023-10-19 DIAGNOSIS — E05.90 SUBCLINICAL HYPERTHYROIDISM: ICD-10-CM

## 2023-10-19 DIAGNOSIS — E78.00 PURE HYPERCHOLESTEROLEMIA: ICD-10-CM

## 2023-10-19 DIAGNOSIS — E11.9 TYPE 2 DIABETES MELLITUS WITHOUT COMPLICATION, WITHOUT LONG-TERM CURRENT USE OF INSULIN (HCC): ICD-10-CM

## 2023-10-19 DIAGNOSIS — Z00.00 WELCOME TO MEDICARE PREVENTIVE VISIT: Primary | ICD-10-CM

## 2023-10-19 DIAGNOSIS — Z87.891 PERSONAL HISTORY OF TOBACCO USE: ICD-10-CM

## 2023-10-19 DIAGNOSIS — F41.1 GAD (GENERALIZED ANXIETY DISORDER): ICD-10-CM

## 2023-10-19 PROCEDURE — G0402 INITIAL PREVENTIVE EXAM: HCPCS | Performed by: STUDENT IN AN ORGANIZED HEALTH CARE EDUCATION/TRAINING PROGRAM

## 2023-10-19 PROCEDURE — 3044F HG A1C LEVEL LT 7.0%: CPT | Performed by: STUDENT IN AN ORGANIZED HEALTH CARE EDUCATION/TRAINING PROGRAM

## 2023-10-19 PROCEDURE — 3017F COLORECTAL CA SCREEN DOC REV: CPT | Performed by: STUDENT IN AN ORGANIZED HEALTH CARE EDUCATION/TRAINING PROGRAM

## 2023-10-19 PROCEDURE — 3074F SYST BP LT 130 MM HG: CPT | Performed by: STUDENT IN AN ORGANIZED HEALTH CARE EDUCATION/TRAINING PROGRAM

## 2023-10-19 PROCEDURE — G0296 VISIT TO DETERM LDCT ELIG: HCPCS | Performed by: STUDENT IN AN ORGANIZED HEALTH CARE EDUCATION/TRAINING PROGRAM

## 2023-10-19 PROCEDURE — 3078F DIAST BP <80 MM HG: CPT | Performed by: STUDENT IN AN ORGANIZED HEALTH CARE EDUCATION/TRAINING PROGRAM

## 2023-10-19 RX ORDER — SEMAGLUTIDE 2.68 MG/ML
2 INJECTION, SOLUTION SUBCUTANEOUS WEEKLY
Qty: 3 ML | Refills: 2 | Status: SHIPPED | OUTPATIENT
Start: 2023-10-19

## 2023-10-19 RX ORDER — NITROFURANTOIN 25; 75 MG/1; MG/1
CAPSULE ORAL
COMMUNITY
Start: 2023-10-16

## 2023-10-19 RX ORDER — LORAZEPAM 0.5 MG/1
0.5 TABLET ORAL DAILY PRN
Qty: 30 TABLET | Refills: 1 | Status: SHIPPED | OUTPATIENT
Start: 2023-10-19 | End: 2023-12-18

## 2023-10-19 SDOH — ECONOMIC STABILITY: FOOD INSECURITY: WITHIN THE PAST 12 MONTHS, YOU WORRIED THAT YOUR FOOD WOULD RUN OUT BEFORE YOU GOT MONEY TO BUY MORE.: NEVER TRUE

## 2023-10-19 SDOH — ECONOMIC STABILITY: FOOD INSECURITY: WITHIN THE PAST 12 MONTHS, THE FOOD YOU BOUGHT JUST DIDN'T LAST AND YOU DIDN'T HAVE MONEY TO GET MORE.: NEVER TRUE

## 2023-10-19 SDOH — ECONOMIC STABILITY: INCOME INSECURITY: HOW HARD IS IT FOR YOU TO PAY FOR THE VERY BASICS LIKE FOOD, HOUSING, MEDICAL CARE, AND HEATING?: NOT HARD AT ALL

## 2023-10-19 ASSESSMENT — LIFESTYLE VARIABLES
HOW OFTEN DO YOU HAVE A DRINK CONTAINING ALCOHOL: NEVER
HOW MANY STANDARD DRINKS CONTAINING ALCOHOL DO YOU HAVE ON A TYPICAL DAY: PATIENT DOES NOT DRINK

## 2023-10-19 ASSESSMENT — PATIENT HEALTH QUESTIONNAIRE - PHQ9
2. FEELING DOWN, DEPRESSED OR HOPELESS: 0
SUM OF ALL RESPONSES TO PHQ QUESTIONS 1-9: 0
1. LITTLE INTEREST OR PLEASURE IN DOING THINGS: 0
SUM OF ALL RESPONSES TO PHQ9 QUESTIONS 1 & 2: 0
SUM OF ALL RESPONSES TO PHQ QUESTIONS 1-9: 0

## 2023-10-19 NOTE — PATIENT INSTRUCTIONS
specialist is recommended every 1-2 years to screen for glaucoma; cataracts, macular degeneration, and other eye disorders. A preventive dental visit is recommended every 6 months. Try to get at least 150 minutes of exercise per week or 10,000 steps per day on a pedometer . Order or download the FREE \"Exercise & Physical Activity: Your Everyday Guide\" from [employer]. Call 0-233.469.2785 or search [employer] online. You need 2581-9849 mg of calcium and 5811-6416 IU of vitamin D per day. It is possible to meet your calcium requirement with diet alone, but a vitamin D supplement is usually necessary to meet this goal.  When exposed to the sun, use a sunscreen that protects against both UVA and UVB radiation with an SPF of 30 or greater. Reapply every 2 to 3 hours or after sweating, drying off with a towel, or swimming. Always wear a seat belt when traveling in a car. Always wear a helmet when riding a bicycle or motorcycle. no

## 2023-10-19 NOTE — PROGRESS NOTES
Medicare Annual Wellness Visit    Octavio is here for Medicare AWV (/)    Assessment & Plan   Welcome to Medicare preventive visit  Personal history of tobacco use  -     [place] VISIT TO DISCUSS LUNG CA SCREEN W LDCT  -     CT Lung Screen (Initial/Annual/Baseline); Future  Type 2 diabetes mellitus without complication, without long-term current use of insulin (HCC)  -     Semaglutide, 2 MG/DOSE, (OZEMPIC, 2 MG/DOSE,) 8 MG/3ML SOPN; Inject 2 mg into the skin once a week, Disp-3 mL, R-2Normal  Pure hypercholesterolemia  Subclinical hyperthyroidism  -     AFL - Elba Ojeda MD, Endocrinology, [facility] with Reflex; Future  DAV (generalized anxiety disorder)  -     LORazepam (ATIVAN) 0.5 MG tablet; Take 1 tablet by mouth daily as needed for Anxiety for up to 60 days. Max Daily Amount: 0.5 mg, Disp-30 tablet, R-1Normal  Recommendations for Preventive Services Due: see orders and patient instructions/AVS.  Recommended screening schedule for the next 5-10 years is provided to the patient in written form: see Patient Instructions/AVS.     Return in 3 months (on 1/19/2024) for Follow up DM/HTN. Subjective   The following acute and/or chronic problems were also addressed today:  She states that she has been gaining weight lately and would like to try higher dose of Ozempic. Her recent labs showed low TSH with normal T4. She states that she has been taking biotin supplement and agrees to stop it for few days and get thyroid levels rechecked. Her lipids were elevated on recent check and she wants to try taking omega-3 fatty acids to see if that helps control it. She states that her anxiety has been getting worse and she would like to try Ativan that she has taken before. Patient's complete Health Risk Assessment and screening values have been reviewed and are found in Flowsheets.  The following problems were reviewed today and where indicated follow up appointments were made and/or referrals

## 2023-10-20 PROBLEM — E78.00 PURE HYPERCHOLESTEROLEMIA: Status: ACTIVE | Noted: 2023-10-20

## 2023-10-24 ENCOUNTER — TELEPHONE (OUTPATIENT)
Dept: FAMILY MEDICINE CLINIC | Age: 58
End: 2023-10-24

## 2023-10-24 RX ORDER — LOSARTAN POTASSIUM 100 MG/1
TABLET ORAL
Qty: 90 TABLET | Refills: 1 | Status: SHIPPED | OUTPATIENT
Start: 2023-10-24

## 2023-10-24 NOTE — TELEPHONE ENCOUNTER
Tahir Medeiros is calling to request a refill on the following medication(s):    Medication Request:  Requested Prescriptions     Pending Prescriptions Disp Refills    losartan (COZAAR) 100 MG tablet [Pharmacy Med Name: LOSARTAN POTASSIUM 100 MG TAB] 90 tablet 1     Sig: TAKE 1 TABLET BY MOUTH EVERY DAY       Last Visit Date (If Applicable):  87/34/4496    Next Visit Date:    1/24/2024

## 2023-10-24 NOTE — TELEPHONE ENCOUNTER
Patient was treated for UTI about  WEEK AGO and the symptoms are back , she is asking for another antibiotic .

## 2023-10-25 DIAGNOSIS — B96.20 E. COLI UTI: Primary | ICD-10-CM

## 2023-10-25 DIAGNOSIS — N39.0 E. COLI UTI: Primary | ICD-10-CM

## 2023-10-25 RX ORDER — SULFAMETHOXAZOLE AND TRIMETHOPRIM 800; 160 MG/1; MG/1
1 TABLET ORAL 2 TIMES DAILY
Qty: 6 TABLET | Refills: 0 | Status: SHIPPED | OUTPATIENT
Start: 2023-10-25 | End: 2023-10-28

## 2023-11-05 DIAGNOSIS — I10 ESSENTIAL HYPERTENSION: ICD-10-CM

## 2023-11-06 DIAGNOSIS — E11.9 TYPE 2 DIABETES MELLITUS WITHOUT COMPLICATION, WITHOUT LONG-TERM CURRENT USE OF INSULIN (HCC): ICD-10-CM

## 2023-11-06 RX ORDER — HYDROCHLOROTHIAZIDE 25 MG/1
TABLET ORAL
Qty: 90 TABLET | Refills: 1 | Status: SHIPPED | OUTPATIENT
Start: 2023-11-06

## 2023-11-06 RX ORDER — SEMAGLUTIDE 2.68 MG/ML
2 INJECTION, SOLUTION SUBCUTANEOUS WEEKLY
Qty: 3 ML | Refills: 2 | Status: SHIPPED | OUTPATIENT
Start: 2023-11-06

## 2023-11-06 NOTE — TELEPHONE ENCOUNTER
Urvashi Wolf is calling to request a refill on the following medication(s):    Medication Request:  Requested Prescriptions     Pending Prescriptions Disp Refills    hydroCHLOROthiazide (HYDRODIURIL) 25 MG tablet [Pharmacy Med Name: HYDROCHLOROTHIAZIDE 25 MG TAB] 90 tablet 0     Sig: take 1 tablet by mouth every morning       Last Visit Date (If Applicable):  10/19/2023    Next Visit Date:    11/6/2023

## 2023-11-08 DIAGNOSIS — E11.9 TYPE 2 DIABETES MELLITUS WITHOUT COMPLICATION, WITHOUT LONG-TERM CURRENT USE OF INSULIN (HCC): Primary | ICD-10-CM

## 2023-11-20 DIAGNOSIS — F41.9 ANXIETY: ICD-10-CM

## 2023-11-20 RX ORDER — FLUOXETINE HYDROCHLORIDE 20 MG/1
20 CAPSULE ORAL DAILY
Qty: 90 CAPSULE | Refills: 1 | Status: SHIPPED | OUTPATIENT
Start: 2023-11-20 | End: 2023-11-20 | Stop reason: SDUPTHER

## 2023-11-20 NOTE — TELEPHONE ENCOUNTER
Hieu Escobedo is calling to request a refill on the following medication(s):    Medication Request:  Requested Prescriptions     Pending Prescriptions Disp Refills    FLUoxetine (PROZAC) 20 MG capsule 90 capsule 1     Sig: Take 1 capsule by mouth daily       Last Visit Date (If Applicable):  39/17/8435    Next Visit Date:    1/24/2024

## 2023-11-21 RX ORDER — FLUOXETINE HYDROCHLORIDE 20 MG/1
20 CAPSULE ORAL DAILY
Qty: 90 CAPSULE | Refills: 1 | Status: SHIPPED | OUTPATIENT
Start: 2023-11-21

## 2023-11-21 NOTE — TELEPHONE ENCOUNTER
Hiro Hart is calling to request a refill on the following medication(s):    Medication Request:  Requested Prescriptions     Pending Prescriptions Disp Refills    FLUoxetine (PROZAC) 20 MG capsule 90 capsule 1     Sig: Take 1 capsule by mouth daily       Last Visit Date (If Applicable):  56/82/6379    Next Visit Date:    1/24/2024

## 2023-11-23 DIAGNOSIS — E11.9 TYPE 2 DIABETES MELLITUS WITHOUT COMPLICATION, WITHOUT LONG-TERM CURRENT USE OF INSULIN (HCC): ICD-10-CM

## 2023-11-24 RX ORDER — TIRZEPATIDE 2.5 MG/.5ML
INJECTION, SOLUTION SUBCUTANEOUS
Qty: 2 EACH | Refills: 1 | Status: SHIPPED | OUTPATIENT
Start: 2023-11-24

## 2023-11-24 NOTE — TELEPHONE ENCOUNTER
Alex Ferguson is calling to request a refill on the following medication(s):    Medication Request:  Requested Prescriptions     Pending Prescriptions Disp Refills    MOUNJARO 2.5 MG/0.5ML SOPN SC injection [Pharmacy Med Name: MOUNJARO 2.5 MG/0.5 ML PEN]       Sig: INJECT 0.5 ML SUBCUTANEOUSLY ONE TIME PER WEEK       Last Visit Date (If Applicable):  72/17/9157    Next Visit Date:    1/24/2024

## 2023-12-13 DIAGNOSIS — F41.1 GAD (GENERALIZED ANXIETY DISORDER): ICD-10-CM

## 2023-12-14 RX ORDER — LORAZEPAM 0.5 MG/1
0.5 TABLET ORAL DAILY PRN
Qty: 30 TABLET | Refills: 1 | Status: SHIPPED | OUTPATIENT
Start: 2023-12-14 | End: 2024-02-12

## 2023-12-14 NOTE — TELEPHONE ENCOUNTER
Urvashi Wolf is calling to request a refill on the following medication(s):    Medication Request:  Requested Prescriptions     Pending Prescriptions Disp Refills    LORazepam (ATIVAN) 0.5 MG tablet 30 tablet 1     Sig: Take 1 tablet by mouth daily as needed for Anxiety for up to 60 days. Max Daily Amount: 0.5 mg       Last Visit Date (If Applicable):  10/19/2023    Next Visit Date:    1/24/2024

## 2024-01-08 DIAGNOSIS — E11.9 TYPE 2 DIABETES MELLITUS WITHOUT COMPLICATION, WITHOUT LONG-TERM CURRENT USE OF INSULIN (HCC): ICD-10-CM

## 2024-01-08 RX ORDER — TIRZEPATIDE 2.5 MG/.5ML
INJECTION, SOLUTION SUBCUTANEOUS
Qty: 2 EACH | Refills: 1 | Status: SHIPPED | OUTPATIENT
Start: 2024-01-08

## 2024-01-08 RX ORDER — TIRZEPATIDE 2.5 MG/.5ML
INJECTION, SOLUTION SUBCUTANEOUS
Qty: 2 EACH | Refills: 1 | OUTPATIENT
Start: 2024-01-08

## 2024-01-08 NOTE — TELEPHONE ENCOUNTER
Urvashi Wolf is calling to request a refill on the following medication(s):    Medication Request:  Requested Prescriptions     Pending Prescriptions Disp Refills    Tirzepatide (MOUNJARO) 2.5 MG/0.5ML SOPN SC injection 2 each 1       Last Visit Date (If Applicable):  10/19/2023    Next Visit Date:    1/24/2024

## 2024-01-08 NOTE — TELEPHONE ENCOUNTER
Urvashi Wolf is calling to request a refill on the following medication(s):    Medication Request:  Requested Prescriptions     Pending Prescriptions Disp Refills    Tirzepatide (MOUNJARO) 2.5 MG/0.5ML SOPN SC injection 2 each 1       Last Visit Date (If Applicable):  10/19/2023    Next Visit Date:    1/8/2024                 08-Feb-2020 02:41

## 2024-01-10 ENCOUNTER — TELEPHONE (OUTPATIENT)
Dept: FAMILY MEDICINE CLINIC | Age: 59
End: 2024-01-10

## 2024-03-24 DIAGNOSIS — E11.9 TYPE 2 DIABETES MELLITUS WITHOUT COMPLICATION, WITHOUT LONG-TERM CURRENT USE OF INSULIN (HCC): ICD-10-CM

## 2024-03-25 RX ORDER — TIRZEPATIDE 2.5 MG/.5ML
INJECTION, SOLUTION SUBCUTANEOUS
Qty: 2 EACH | Refills: 1 | Status: SHIPPED | OUTPATIENT
Start: 2024-03-25

## 2024-03-25 NOTE — TELEPHONE ENCOUNTER
Urvashi Wolf is calling to request a refill on the following medication(s):    Medication Request:  Requested Prescriptions     Pending Prescriptions Disp Refills    Tirzepatide (MOUNJARO) 2.5 MG/0.5ML SOPN SC injection 2 each 1     Sig: INJECT 0.5 ML SUBCUTANEOUSLY ONE TIME PER WEEK       Last Visit Date (If Applicable):  10/19/2023    Next Visit Date:    Visit date not found

## 2024-04-25 DIAGNOSIS — I10 ESSENTIAL HYPERTENSION: ICD-10-CM

## 2024-04-25 NOTE — TELEPHONE ENCOUNTER
Urvashi Wolf is calling to request a refill on the following medication(s):    Medication Request:  Requested Prescriptions     Pending Prescriptions Disp Refills    hydroCHLOROthiazide (HYDRODIURIL) 25 MG tablet [Pharmacy Med Name: HYDROCHLOROTHIAZIDE 25 MG TAB] 90 tablet 1     Sig: take 1 tablet by mouth every morning       Last Visit Date (If Applicable):  10/19/2023    Next Visit Date:    Visit date not found

## 2024-04-26 RX ORDER — HYDROCHLOROTHIAZIDE 25 MG/1
TABLET ORAL
Qty: 90 TABLET | Refills: 0 | Status: SHIPPED | OUTPATIENT
Start: 2024-04-26

## 2024-04-29 NOTE — TELEPHONE ENCOUNTER
Lvm for pt to cb to make appt       Urvashi Wolf is calling to request a refill on the following medication(s):    Medication Request:  Requested Prescriptions     Pending Prescriptions Disp Refills    losartan (COZAAR) 100 MG tablet [Pharmacy Med Name: LOSARTAN POTASSIUM 100 MG TAB] 90 tablet 1     Sig: TAKE 1 TABLET BY MOUTH EVERY DAY       Last Visit Date (If Applicable):  10/19/2023    Next Visit Date:    Visit date not found

## 2024-04-30 RX ORDER — LOSARTAN POTASSIUM 100 MG/1
TABLET ORAL
Qty: 90 TABLET | Refills: 0 | Status: SHIPPED | OUTPATIENT
Start: 2024-04-30

## 2024-05-08 DIAGNOSIS — F41.9 ANXIETY: ICD-10-CM

## 2024-05-08 RX ORDER — FLUOXETINE HYDROCHLORIDE 20 MG/1
CAPSULE ORAL DAILY
Qty: 90 CAPSULE | Refills: 0 | Status: SHIPPED | OUTPATIENT
Start: 2024-05-08 | End: 2024-06-20 | Stop reason: SDUPTHER

## 2024-05-08 NOTE — TELEPHONE ENCOUNTER
Urvashi Wolf is calling to request a refill on the following medication(s):    Medication Request:  Requested Prescriptions     Pending Prescriptions Disp Refills    FLUoxetine (PROZAC) 20 MG capsule [Pharmacy Med Name: FLUOXETINE HCL 20 MG CAPSULE] 90 capsule 1     Sig: TAKE 1 CAPSULE BY MOUTH EVERY DAY       Last Visit Date (If Applicable):  10/19/2023    Next Visit Date:    Visit date not found

## 2024-06-20 DIAGNOSIS — E11.9 TYPE 2 DIABETES MELLITUS WITHOUT COMPLICATION, WITHOUT LONG-TERM CURRENT USE OF INSULIN (HCC): ICD-10-CM

## 2024-06-20 DIAGNOSIS — F41.9 ANXIETY: ICD-10-CM

## 2024-06-20 RX ORDER — FLUOXETINE HYDROCHLORIDE 20 MG/1
20 CAPSULE ORAL DAILY
Qty: 90 CAPSULE | Refills: 0 | Status: SHIPPED | OUTPATIENT
Start: 2024-06-20

## 2024-06-20 RX ORDER — SEMAGLUTIDE 2.68 MG/ML
2 INJECTION, SOLUTION SUBCUTANEOUS WEEKLY
Qty: 3 ML | Refills: 0 | Status: SHIPPED | OUTPATIENT
Start: 2024-06-20

## 2024-06-20 NOTE — TELEPHONE ENCOUNTER
Urvashi Wolf is calling to request a refill on the following medication(s):    Medication Request:  Requested Prescriptions     Pending Prescriptions Disp Refills    FLUoxetine (PROZAC) 20 MG capsule 90 capsule 0     Sig: Take by mouth daily    semaglutide, 2 MG/DOSE, (OZEMPIC, 2 MG/DOSE,) 8 MG/3ML SOPN sc injection 3 mL 2     Sig: Inject 2 mg into the skin once a week       Last Visit Date (If Applicable):  10/19/2023    Next Visit Date:    7/25/2024

## 2024-07-16 DIAGNOSIS — E11.9 TYPE 2 DIABETES MELLITUS WITHOUT COMPLICATION, WITHOUT LONG-TERM CURRENT USE OF INSULIN (HCC): ICD-10-CM

## 2024-07-17 RX ORDER — SEMAGLUTIDE 2.68 MG/ML
2 INJECTION, SOLUTION SUBCUTANEOUS WEEKLY
Qty: 3 ML | Refills: 0 | Status: SHIPPED | OUTPATIENT
Start: 2024-07-17

## 2024-07-17 NOTE — TELEPHONE ENCOUNTER
Urvashi Wolf is calling to request a refill on the following medication(s):    Medication Request:  Requested Prescriptions     Pending Prescriptions Disp Refills    semaglutide, 2 MG/DOSE, (OZEMPIC, 2 MG/DOSE,) 8 MG/3ML SOPN sc injection 3 mL 0     Sig: Inject 2 mg into the skin once a week       Last Visit Date (If Applicable):  10/19/2023    Next Visit Date:    7/25/2024

## 2024-07-25 ENCOUNTER — OFFICE VISIT (OUTPATIENT)
Dept: FAMILY MEDICINE CLINIC | Age: 59
End: 2024-07-25
Payer: MEDICARE

## 2024-07-25 VITALS
SYSTOLIC BLOOD PRESSURE: 120 MMHG | BODY MASS INDEX: 28 KG/M2 | HEART RATE: 79 BPM | HEIGHT: 60 IN | WEIGHT: 142.6 LBS | DIASTOLIC BLOOD PRESSURE: 74 MMHG | OXYGEN SATURATION: 97 %

## 2024-07-25 DIAGNOSIS — Z12.31 ENCOUNTER FOR SCREENING MAMMOGRAM FOR MALIGNANT NEOPLASM OF BREAST: ICD-10-CM

## 2024-07-25 DIAGNOSIS — I10 ESSENTIAL HYPERTENSION: ICD-10-CM

## 2024-07-25 DIAGNOSIS — Z00.00 MEDICARE ANNUAL WELLNESS VISIT, SUBSEQUENT: Primary | ICD-10-CM

## 2024-07-25 DIAGNOSIS — Z87.891 PERSONAL HISTORY OF TOBACCO USE: ICD-10-CM

## 2024-07-25 DIAGNOSIS — G47.33 MILD OBSTRUCTIVE SLEEP APNEA: ICD-10-CM

## 2024-07-25 DIAGNOSIS — E11.9 TYPE 2 DIABETES MELLITUS WITHOUT COMPLICATION, WITHOUT LONG-TERM CURRENT USE OF INSULIN (HCC): ICD-10-CM

## 2024-07-25 PROCEDURE — G0439 PPPS, SUBSEQ VISIT: HCPCS | Performed by: STUDENT IN AN ORGANIZED HEALTH CARE EDUCATION/TRAINING PROGRAM

## 2024-07-25 PROCEDURE — 3046F HEMOGLOBIN A1C LEVEL >9.0%: CPT | Performed by: STUDENT IN AN ORGANIZED HEALTH CARE EDUCATION/TRAINING PROGRAM

## 2024-07-25 PROCEDURE — 3074F SYST BP LT 130 MM HG: CPT | Performed by: STUDENT IN AN ORGANIZED HEALTH CARE EDUCATION/TRAINING PROGRAM

## 2024-07-25 PROCEDURE — G0296 VISIT TO DETERM LDCT ELIG: HCPCS | Performed by: STUDENT IN AN ORGANIZED HEALTH CARE EDUCATION/TRAINING PROGRAM

## 2024-07-25 PROCEDURE — 3017F COLORECTAL CA SCREEN DOC REV: CPT | Performed by: STUDENT IN AN ORGANIZED HEALTH CARE EDUCATION/TRAINING PROGRAM

## 2024-07-25 PROCEDURE — 3078F DIAST BP <80 MM HG: CPT | Performed by: STUDENT IN AN ORGANIZED HEALTH CARE EDUCATION/TRAINING PROGRAM

## 2024-07-25 RX ORDER — IBUPROFEN 800 MG/1
TABLET ORAL
COMMUNITY
Start: 2024-07-22

## 2024-07-25 RX ORDER — LOSARTAN POTASSIUM 25 MG/1
50 TABLET ORAL DAILY
Qty: 180 TABLET | Refills: 0 | Status: SHIPPED | OUTPATIENT
Start: 2024-07-25

## 2024-07-25 RX ORDER — MORPHINE SULFATE 30 MG/1
30 CAPSULE, EXTENDED RELEASE ORAL 2 TIMES DAILY
COMMUNITY
Start: 2024-07-09

## 2024-07-25 RX ORDER — LOSARTAN POTASSIUM 25 MG/1
50 TABLET ORAL DAILY
Qty: 60 TABLET | Refills: 2 | Status: SHIPPED | OUTPATIENT
Start: 2024-07-25 | End: 2024-07-25

## 2024-07-25 RX ORDER — KETOROLAC TROMETHAMINE 30 MG/ML
INJECTION, SOLUTION INTRAMUSCULAR; INTRAVENOUS
Qty: 1 EACH | Refills: 1 | Status: SHIPPED | OUTPATIENT
Start: 2024-07-25

## 2024-07-25 RX ORDER — BLOOD-GLUCOSE SENSOR
EACH MISCELLANEOUS
Qty: 1 EACH | Refills: 1 | Status: SHIPPED | OUTPATIENT
Start: 2024-07-25

## 2024-07-25 NOTE — PROGRESS NOTES
Medicare Annual Wellness Visit    Urvashi Wolf is here for Medicare AWV, Hypertension, Diabetes, Weight Loss (Gaining weight on Ozempic ), and Medication Check (ozempic)    Assessment & Plan   Medicare annual wellness visit, subsequent  Encounter for screening mammogram for malignant neoplasm of breast  -     [facility]UR[facility] DIGITAL SCREEN W OR WO CAD BILATERAL; Future  Personal history of tobacco use  -     [place] VISIT TO DISCUSS LUNG CA SCREEN W LDCT  -     CT Lung Screen (Initial/Annual/Baseline); Future  Mild obstructive sleep apnea  -     Baseline Diagnostic Sleep Study; Future  Type 2 diabetes mellitus without complication, without long-term current use of insulin (HCC)  -     Tirzepatide (MOUNJARO) 2.5 MG/0.5ML SOPN SC injection; Inject 0.5 mLs into the skin once a week, Disp-2 each, R-0Normal  -     Continuous Glucose Sensor (FREESTYLE JUSTIN 3 SENSOR) MISC; Use as directed, Disp-1 each, R-1Normal  -     Continuous Glucose  (FREESTYLE UJSTIN 3 READER) NOHELIA; Use as directed, Disp-1 each, R-1Normal  Essential hypertension  -     losartan (COZAAR) 25 MG tablet; Take 2 tablets by mouth daily, Disp-60 tablet, R-2Normal  Recommendations for Preventive Services Due: see orders and patient instructions/AVS.  Recommended screening schedule for the next 5-10 years is provided to the patient in written form: see Patient Instructions/AVS.     Return in 1 month (on 8/25/2024) for Follow up DM/HTN.     Subjective   The following acute and/or chronic problems were also addressed today:  She states that she stopped taking losartan 100 mg saying that she did not think she needed the blood pressure medications.  She is still taking the hydrochlorothiazide saying she has a prescription still with her.  She agrees to start on low-dose losartan and stop the hydrochlorothiazide and continue monitoring blood pressures.  She also states that the Ozempic is not working anymore for her and she is not able to lose any weight.  She feels like

## 2024-07-25 NOTE — TELEPHONE ENCOUNTER
Urvashi Wolf is calling to request a refill on the following medication(s):    Medication Request:  Requested Prescriptions     Pending Prescriptions Disp Refills    losartan (COZAAR) 25 MG tablet [Pharmacy Med Name: LOSARTAN 25MG TABLETS] 180 tablet      Sig: TAKE 2 TABLETS BY MOUTH DAILY       Last Visit Date (If Applicable):  7/25/2024    Next Visit Date:    Visit date not found

## 2024-07-29 ENCOUNTER — TELEPHONE (OUTPATIENT)
Dept: FAMILY MEDICINE CLINIC | Age: 59
End: 2024-07-29

## 2024-08-28 DIAGNOSIS — E11.9 TYPE 2 DIABETES MELLITUS WITHOUT COMPLICATION, WITHOUT LONG-TERM CURRENT USE OF INSULIN (HCC): ICD-10-CM

## 2024-08-28 RX ORDER — TIRZEPATIDE 2.5 MG/.5ML
INJECTION, SOLUTION SUBCUTANEOUS
Qty: 4 ML | Refills: 1 | Status: SHIPPED | OUTPATIENT
Start: 2024-08-28

## 2024-08-28 NOTE — TELEPHONE ENCOUNTER
Urvashi Wolf is calling to request a refill on the following medication(s):    Medication Request:  Requested Prescriptions     Pending Prescriptions Disp Refills    MOUNJARO 2.5 MG/0.5ML SOPN SC injection [Pharmacy Med Name: MOUNJARO 2.5MG/0.5ML INJ ( 4 PENS)] 4 mL      Sig: ADMINISTER 2.5 MG UNDER THE SKIN 1 TIME A WEEK       Last Visit Date (If Applicable):  7/25/2024    Next Visit Date:    Visit date not found

## 2024-09-18 DIAGNOSIS — F41.9 ANXIETY: ICD-10-CM

## 2024-09-18 DIAGNOSIS — E11.9 TYPE 2 DIABETES MELLITUS WITHOUT COMPLICATION, WITHOUT LONG-TERM CURRENT USE OF INSULIN (HCC): ICD-10-CM

## 2024-09-18 RX ORDER — TIRZEPATIDE 2.5 MG/.5ML
2.5 INJECTION, SOLUTION SUBCUTANEOUS WEEKLY
Qty: 4 ML | Refills: 1 | Status: SHIPPED | OUTPATIENT
Start: 2024-09-18

## 2024-10-28 DIAGNOSIS — I10 ESSENTIAL HYPERTENSION: ICD-10-CM

## 2024-10-28 RX ORDER — LOSARTAN POTASSIUM 25 MG/1
50 TABLET ORAL DAILY
Qty: 180 TABLET | Refills: 0 | Status: SHIPPED | OUTPATIENT
Start: 2024-10-28

## 2024-10-28 NOTE — TELEPHONE ENCOUNTER
Urvashi Wolf is calling to request a refill on the following medication(s):    Medication Request:  Requested Prescriptions     Pending Prescriptions Disp Refills    losartan (COZAAR) 25 MG tablet [Pharmacy Med Name: LOSARTAN 25MG TABLETS] 180 tablet 0     Sig: TAKE 2 TABLETS BY MOUTH DAILY       Last Visit Date (If Applicable):  7/25/2024    Next Visit Date:    Visit date not found

## 2024-11-04 DIAGNOSIS — I10 ESSENTIAL HYPERTENSION: ICD-10-CM

## 2024-11-04 RX ORDER — LOSARTAN POTASSIUM 25 MG/1
50 TABLET ORAL DAILY
Qty: 180 TABLET | Refills: 0 | Status: SHIPPED | OUTPATIENT
Start: 2024-11-04

## 2024-11-04 NOTE — TELEPHONE ENCOUNTER
Urvashi Wolf is calling to request a refill on the following medication(s):    Medication Request:  Requested Prescriptions     Pending Prescriptions Disp Refills    losartan (COZAAR) 25 MG tablet [Pharmacy Med Name: LOSARTAN 25MG TABLETS] 180 tablet 0     Sig: TAKE 2 TABLETS BY MOUTH DAILY       Last Visit Date (If Applicable):  7/25/2024    Next Visit Date:    Visit date not found              f

## 2024-12-14 DIAGNOSIS — F41.9 ANXIETY: ICD-10-CM

## 2024-12-16 NOTE — TELEPHONE ENCOUNTER
Urvashi Wolf is calling to request a refill on the following medication(s):    Medication Request:  Requested Prescriptions     Pending Prescriptions Disp Refills    FLUoxetine (PROZAC) 20 MG capsule [Pharmacy Med Name: FLUOXETINE 20MG CAPSULES] 90 capsule 0     Sig: TAKE 1 CAPSULE BY MOUTH DAILY       Last Visit Date (If Applicable):  7/25/2024    Next Visit Date:    Visit date not found

## 2024-12-21 DIAGNOSIS — F41.9 ANXIETY: ICD-10-CM

## 2025-02-04 ENCOUNTER — TELEPHONE (OUTPATIENT)
Dept: FAMILY MEDICINE CLINIC | Age: 60
End: 2025-02-04

## 2025-02-04 DIAGNOSIS — U07.1 COVID-19: Primary | ICD-10-CM

## 2025-05-28 ENCOUNTER — OFFICE VISIT (OUTPATIENT)
Age: 60
End: 2025-05-28

## 2025-05-28 VITALS
DIASTOLIC BLOOD PRESSURE: 76 MMHG | HEART RATE: 77 BPM | HEIGHT: 60 IN | TEMPERATURE: 97.9 F | OXYGEN SATURATION: 95 % | BODY MASS INDEX: 26.5 KG/M2 | RESPIRATION RATE: 14 BRPM | SYSTOLIC BLOOD PRESSURE: 120 MMHG | WEIGHT: 135 LBS

## 2025-05-28 DIAGNOSIS — L23.7 POISON IVY DERMATITIS: Primary | ICD-10-CM

## 2025-05-28 DIAGNOSIS — L23.7 ALLERGIC CONTACT DERMATITIS DUE TO PLANTS, EXCEPT FOOD: ICD-10-CM

## 2025-05-28 RX ORDER — PREDNISONE 20 MG/1
20 TABLET ORAL DAILY
Qty: 7 TABLET | Refills: 0 | Status: SHIPPED | OUTPATIENT
Start: 2025-05-28 | End: 2025-06-04

## 2025-05-28 RX ORDER — DEXAMETHASONE SODIUM PHOSPHATE 10 MG/ML
10 INJECTION, SOLUTION INTRAMUSCULAR; INTRAVENOUS ONCE
Status: COMPLETED | OUTPATIENT
Start: 2025-05-28 | End: 2025-05-28

## 2025-05-28 RX ORDER — CETIRIZINE HYDROCHLORIDE 10 MG/1
10 TABLET ORAL DAILY
Qty: 30 TABLET | Refills: 0 | Status: SHIPPED | OUTPATIENT
Start: 2025-05-28

## 2025-05-28 RX ADMIN — DEXAMETHASONE SODIUM PHOSPHATE 10 MG: 10 INJECTION, SOLUTION INTRAMUSCULAR; INTRAVENOUS at 17:58

## 2025-05-28 ASSESSMENT — ENCOUNTER SYMPTOMS
DIARRHEA: 0
ABDOMINAL PAIN: 0
COLOR CHANGE: 0
NAUSEA: 0
VOMITING: 0
CONSTIPATION: 0
SHORTNESS OF BREATH: 0
VOICE CHANGE: 0
SORE THROAT: 0
TROUBLE SWALLOWING: 0
COUGH: 0
EYE REDNESS: 0
CHEST TIGHTNESS: 0
EYE PAIN: 0
BACK PAIN: 0

## 2025-05-28 NOTE — PROGRESS NOTES
Chief complaint(s): Poison Ivy (Started 3 days ago, located on patients arms)      History of present illness :     Urvashi Wolf  is a  very pleasant  60 y.o. female presented to the urgent care today  for evaluation of pruritic rash on her right arm that started 2 days ago after she was cleaning bean at her yard.  She may got in contact with poison ivy/ oak .  She as been using hydrocortisone cream with brief relief.  No difficulty swallowing.  No angioedema.  No other angioedema.  No wheezing.  No fever . No chest pain , shortness of breath or cough . No abdominal or back pain.  No nausea or vomiting.  No change in urination or bowel movement.  No neurovascular or motor changes in upper or lower extremities.  Denies any new drinking anything out of the ordinary.  Denies starting any new medications             PAST MEDICAL HISTORY    Past Medical History:   Diagnosis Date    Abnormal weight gain 5/14/2012    Back pain 4/18/2012    Cervical disc disease     Chronic back pain     Diabetes mellitus (HCC)     Fibromyalgia     Hypertension     Labile blood pressure 3/22/2014    Spasm of muscle 4/18/2012    Tachycardia 3/21/2014    Tobacco use 9/14/2020       SURGICAL HISTORY    Past Surgical History:   Procedure Laterality Date    BREAST ENHANCEMENT SURGERY      CERVICAL DISC SURGERY  03/21/2014    ANTERIOR CERVICAL MICRODISECTOMY AND FUSION C 6-7--saw  in [place] 2007 then 2014 was done at [facility]    CERVICAL DISC SURGERY  12/2021    C3-C4 ACDF    CERVICAL DISCECTOMY  2007    HEMORRHOID SURGERY      OTHER SURGICAL HISTORY  08/2018    fistulotomy repair, Dr Gastelum    TONSILLECTOMY      TUBAL LIGATION         CURRENT MEDICATIONS    Current Outpatient Rx   Medication Sig Dispense Refill    predniSONE (DELTASONE) 20 MG tablet Take 1 tablet by mouth daily for 7 days 7 tablet 0    cetirizine (ZYRTEC) 10 MG tablet Take 1 tablet by mouth daily 30 tablet 0    FLUoxetine (PROZAC) 20 MG capsule TAKE 1 CAPSULE BY